# Patient Record
Sex: FEMALE | Race: WHITE | NOT HISPANIC OR LATINO | Employment: OTHER | RURAL
[De-identification: names, ages, dates, MRNs, and addresses within clinical notes are randomized per-mention and may not be internally consistent; named-entity substitution may affect disease eponyms.]

---

## 2018-03-07 ENCOUNTER — HISTORICAL (OUTPATIENT)
Dept: ADMINISTRATIVE | Facility: HOSPITAL | Age: 38
End: 2018-03-07

## 2018-03-09 LAB
LAB AP CLINICAL INFORMATION: NORMAL
LAB AP GENERAL CAT - HISTORICAL: NORMAL
LAB AP INTERPRETATION/RESULT - HISTORICAL: NEGATIVE
LAB AP SPECIMEN ADEQUACY - HISTORICAL: NORMAL
LAB AP SPECIMEN SUBMITTED - HISTORICAL: NORMAL

## 2020-06-09 ENCOUNTER — HISTORICAL (OUTPATIENT)
Dept: ADMINISTRATIVE | Facility: HOSPITAL | Age: 40
End: 2020-06-09

## 2020-06-09 LAB
GLUCOSE SERPL-MCNC: 151 MG/DL (ref 70–105)
GLUCOSE SERPL-MCNC: 195 MG/DL (ref 70–105)

## 2020-07-28 ENCOUNTER — HISTORICAL (OUTPATIENT)
Dept: ADMINISTRATIVE | Facility: HOSPITAL | Age: 40
End: 2020-07-28

## 2020-08-04 ENCOUNTER — HISTORICAL (OUTPATIENT)
Dept: ADMINISTRATIVE | Facility: HOSPITAL | Age: 40
End: 2020-08-04

## 2020-09-02 ENCOUNTER — HISTORICAL (OUTPATIENT)
Dept: ADMINISTRATIVE | Facility: HOSPITAL | Age: 40
End: 2020-09-02

## 2020-09-02 LAB — GLUCOSE SERPL-MCNC: 163 MG/DL (ref 70–105)

## 2020-09-16 ENCOUNTER — HISTORICAL (OUTPATIENT)
Dept: ADMINISTRATIVE | Facility: HOSPITAL | Age: 40
End: 2020-09-16

## 2021-04-14 RX ORDER — OXYCODONE AND ACETAMINOPHEN 7.5; 325 MG/1; MG/1
1 TABLET ORAL EVERY 12 HOURS
COMMUNITY
End: 2021-05-17

## 2021-04-14 RX ORDER — GABAPENTIN 300 MG/1
300 CAPSULE ORAL 3 TIMES DAILY
COMMUNITY
End: 2021-05-17 | Stop reason: SDUPTHER

## 2021-04-14 RX ORDER — BACLOFEN 10 MG/1
10 TABLET ORAL 3 TIMES DAILY
COMMUNITY
End: 2021-08-18

## 2021-04-14 RX ORDER — LEVETIRACETAM 750 MG/1
500 TABLET ORAL 2 TIMES DAILY
Status: ON HOLD | COMMUNITY
End: 2022-05-11 | Stop reason: HOSPADM

## 2021-04-14 RX ORDER — RIZATRIPTAN BENZOATE 10 MG/1
10 TABLET ORAL
COMMUNITY
End: 2021-09-16

## 2021-04-20 ENCOUNTER — OFFICE VISIT (OUTPATIENT)
Dept: PAIN MEDICINE | Facility: CLINIC | Age: 41
End: 2021-04-20
Payer: MEDICARE

## 2021-04-20 VITALS
HEART RATE: 93 BPM | RESPIRATION RATE: 20 BRPM | SYSTOLIC BLOOD PRESSURE: 147 MMHG | WEIGHT: 178.63 LBS | BODY MASS INDEX: 32.87 KG/M2 | DIASTOLIC BLOOD PRESSURE: 81 MMHG | HEIGHT: 62 IN

## 2021-04-20 DIAGNOSIS — M25.562 CHRONIC PAIN OF LEFT KNEE: Chronic | ICD-10-CM

## 2021-04-20 DIAGNOSIS — M05.79 RHEUMATOID ARTHRITIS INVOLVING MULTIPLE SITES WITH POSITIVE RHEUMATOID FACTOR: Chronic | ICD-10-CM

## 2021-04-20 DIAGNOSIS — M45.0 ANKYLOSING SPONDYLITIS OF MULTIPLE SITES IN SPINE: Chronic | ICD-10-CM

## 2021-04-20 DIAGNOSIS — M54.17 LUMBOSACRAL RADICULOPATHY: Primary | Chronic | ICD-10-CM

## 2021-04-20 DIAGNOSIS — M47.812 SPONDYLOSIS WITHOUT MYELOPATHY OR RADICULOPATHY, CERVICAL REGION: Chronic | ICD-10-CM

## 2021-04-20 DIAGNOSIS — G89.29 CHRONIC PAIN OF LEFT KNEE: Chronic | ICD-10-CM

## 2021-04-20 DIAGNOSIS — M54.9 DORSALGIA, UNSPECIFIED: ICD-10-CM

## 2021-04-20 DIAGNOSIS — Z79.899 ENCOUNTER FOR LONG-TERM (CURRENT) USE OF OTHER MEDICATIONS: ICD-10-CM

## 2021-04-20 LAB
CTP QC/QA: YES
POC (AMP) AMPHETAMINE: NEGATIVE
POC (BAR) BARBITURATES: NEGATIVE
POC (BUP) BUPRENORPHINE: NEGATIVE
POC (BZO) BENZODIAZEPINES: NEGATIVE
POC (COC) COCAINE: NEGATIVE
POC (MDMA) METHYLENEDIOXYMETHAMPHETAMINE 3,4: NEGATIVE
POC (MET) METHAMPHETAMINE: NEGATIVE
POC (MOP) OPIATES: NEGATIVE
POC (MTD) METHADONE: NEGATIVE
POC (OXY) OXYCODONE: ABNORMAL
POC (PCP) PHENCYCLIDINE: NEGATIVE
POC (TCA) TRICYCLIC ANTIDEPRESSANTS: NEGATIVE
POC TEMPERATURE (URINE): 90
POC THC: NEGATIVE

## 2021-04-20 PROCEDURE — 99214 OFFICE O/P EST MOD 30 MIN: CPT | Mod: S$PBB,,, | Performed by: PHYSICIAN ASSISTANT

## 2021-04-20 PROCEDURE — 99999 PR PBB SHADOW E&M-EST. PATIENT-LVL V: ICD-10-PCS | Mod: PBBFAC,,, | Performed by: PHYSICIAN ASSISTANT

## 2021-04-20 PROCEDURE — 99215 OFFICE O/P EST HI 40 MIN: CPT | Mod: PBBFAC | Performed by: PHYSICIAN ASSISTANT

## 2021-04-20 PROCEDURE — 99999 PR PBB SHADOW E&M-EST. PATIENT-LVL V: CPT | Mod: PBBFAC,,, | Performed by: PHYSICIAN ASSISTANT

## 2021-04-20 PROCEDURE — 80305 DRUG TEST PRSMV DIR OPT OBS: CPT | Mod: PBBFAC | Performed by: PHYSICIAN ASSISTANT

## 2021-04-20 PROCEDURE — 99214 PR OFFICE/OUTPT VISIT, EST, LEVL IV, 30-39 MIN: ICD-10-PCS | Mod: S$PBB,,, | Performed by: PHYSICIAN ASSISTANT

## 2021-04-20 RX ORDER — OXYCODONE AND ACETAMINOPHEN 7.5; 325 MG/1; MG/1
1 TABLET ORAL EVERY 12 HOURS
Qty: 60 TABLET | Refills: 0 | Status: SHIPPED | OUTPATIENT
Start: 2021-04-23 | End: 2021-05-17 | Stop reason: SDUPTHER

## 2021-04-22 ENCOUNTER — TELEPHONE (OUTPATIENT)
Dept: PAIN MEDICINE | Facility: CLINIC | Age: 41
End: 2021-04-22

## 2021-04-26 ENCOUNTER — PATIENT MESSAGE (OUTPATIENT)
Dept: PAIN MEDICINE | Facility: CLINIC | Age: 41
End: 2021-04-26

## 2021-05-13 ENCOUNTER — TELEPHONE (OUTPATIENT)
Dept: PAIN MEDICINE | Facility: CLINIC | Age: 41
End: 2021-05-13

## 2021-05-13 ENCOUNTER — HOSPITAL ENCOUNTER (EMERGENCY)
Facility: HOSPITAL | Age: 41
Discharge: HOME | End: 2021-05-13
Payer: MEDICARE

## 2021-05-13 VITALS
BODY MASS INDEX: 32.76 KG/M2 | HEART RATE: 100 BPM | DIASTOLIC BLOOD PRESSURE: 90 MMHG | RESPIRATION RATE: 19 BRPM | TEMPERATURE: 98 F | SYSTOLIC BLOOD PRESSURE: 129 MMHG | WEIGHT: 178 LBS | OXYGEN SATURATION: 98 % | HEIGHT: 62 IN

## 2021-05-13 DIAGNOSIS — M54.17 LUMBOSACRAL RADICULOPATHY: Chronic | ICD-10-CM

## 2021-05-13 DIAGNOSIS — M45.0 ANKYLOSING SPONDYLITIS OF MULTIPLE SITES IN SPINE: Chronic | ICD-10-CM

## 2021-05-13 DIAGNOSIS — M47.812 SPONDYLOSIS WITHOUT MYELOPATHY OR RADICULOPATHY, CERVICAL REGION: Chronic | ICD-10-CM

## 2021-05-13 DIAGNOSIS — M05.79 RHEUMATOID ARTHRITIS INVOLVING MULTIPLE SITES WITH POSITIVE RHEUMATOID FACTOR: Chronic | ICD-10-CM

## 2021-05-13 DIAGNOSIS — M25.562 CHRONIC PAIN OF LEFT KNEE: Chronic | ICD-10-CM

## 2021-05-13 DIAGNOSIS — G89.29 CHRONIC PAIN OF LEFT KNEE: Chronic | ICD-10-CM

## 2021-05-13 DIAGNOSIS — S40.012A CONTUSION OF LEFT SHOULDER, INITIAL ENCOUNTER: Primary | ICD-10-CM

## 2021-05-13 LAB — GLUCOSE SERPL-MCNC: 245 MG/DL (ref 70–105)

## 2021-05-13 PROCEDURE — 82962 GLUCOSE BLOOD TEST: CPT

## 2021-05-13 PROCEDURE — 99284 EMERGENCY DEPT VISIT MOD MDM: CPT | Mod: 25

## 2021-05-13 PROCEDURE — 99283 EMERGENCY DEPT VISIT LOW MDM: CPT | Mod: ,,, | Performed by: NURSE PRACTITIONER

## 2021-05-13 PROCEDURE — 96372 THER/PROPH/DIAG INJ SC/IM: CPT

## 2021-05-13 PROCEDURE — 99283 PR EMERGENCY DEPT VISIT,LEVEL III: ICD-10-PCS | Mod: ,,, | Performed by: NURSE PRACTITIONER

## 2021-05-13 PROCEDURE — 63600175 PHARM REV CODE 636 W HCPCS: Performed by: NURSE PRACTITIONER

## 2021-05-13 RX ORDER — HYDROMORPHONE HYDROCHLORIDE 2 MG/ML
1 INJECTION, SOLUTION INTRAMUSCULAR; INTRAVENOUS; SUBCUTANEOUS
Status: COMPLETED | OUTPATIENT
Start: 2021-05-13 | End: 2021-05-13

## 2021-05-13 RX ADMIN — HYDROMORPHONE HYDROCHLORIDE 1 MG: 2 INJECTION, SOLUTION INTRAMUSCULAR; INTRAVENOUS; SUBCUTANEOUS at 10:05

## 2021-05-17 ENCOUNTER — OFFICE VISIT (OUTPATIENT)
Dept: PAIN MEDICINE | Facility: CLINIC | Age: 41
End: 2021-05-17
Payer: MEDICARE

## 2021-05-17 VITALS
RESPIRATION RATE: 18 BRPM | BODY MASS INDEX: 32.64 KG/M2 | HEART RATE: 99 BPM | DIASTOLIC BLOOD PRESSURE: 89 MMHG | WEIGHT: 177.38 LBS | SYSTOLIC BLOOD PRESSURE: 120 MMHG | HEIGHT: 62 IN

## 2021-05-17 DIAGNOSIS — M05.79 RHEUMATOID ARTHRITIS INVOLVING MULTIPLE SITES WITH POSITIVE RHEUMATOID FACTOR: Chronic | ICD-10-CM

## 2021-05-17 DIAGNOSIS — M54.12 RADICULOPATHY, CERVICAL REGION: Chronic | ICD-10-CM

## 2021-05-17 DIAGNOSIS — G89.4 CHRONIC PAIN SYNDROME: Chronic | ICD-10-CM

## 2021-05-17 DIAGNOSIS — M25.512 ACUTE PAIN OF LEFT SHOULDER: ICD-10-CM

## 2021-05-17 DIAGNOSIS — M45.0 ANKYLOSING SPONDYLITIS OF MULTIPLE SITES IN SPINE: Primary | Chronic | ICD-10-CM

## 2021-05-17 DIAGNOSIS — M47.817 SPONDYLOSIS OF LUMBOSACRAL REGION WITHOUT MYELOPATHY OR RADICULOPATHY: Chronic | ICD-10-CM

## 2021-05-17 PROCEDURE — 99214 OFFICE O/P EST MOD 30 MIN: CPT | Mod: S$PBB,,, | Performed by: PHYSICIAN ASSISTANT

## 2021-05-17 PROCEDURE — 99214 PR OFFICE/OUTPT VISIT, EST, LEVL IV, 30-39 MIN: ICD-10-PCS | Mod: S$PBB,,, | Performed by: PHYSICIAN ASSISTANT

## 2021-05-17 PROCEDURE — 99214 OFFICE O/P EST MOD 30 MIN: CPT | Mod: PBBFAC | Performed by: PHYSICIAN ASSISTANT

## 2021-05-17 RX ORDER — GABAPENTIN 300 MG/1
300 CAPSULE ORAL EVERY 8 HOURS
Qty: 90 CAPSULE | Refills: 2 | Status: SHIPPED | OUTPATIENT
Start: 2021-05-17 | End: 2021-07-19 | Stop reason: SDUPTHER

## 2021-05-17 RX ORDER — OXYCODONE AND ACETAMINOPHEN 7.5; 325 MG/1; MG/1
1 TABLET ORAL EVERY 12 HOURS
Qty: 60 TABLET | Refills: 0 | Status: SHIPPED | OUTPATIENT
Start: 2021-06-16 | End: 2021-07-19 | Stop reason: SDUPTHER

## 2021-05-17 RX ORDER — OXYCODONE AND ACETAMINOPHEN 7.5; 325 MG/1; MG/1
1 TABLET ORAL EVERY 8 HOURS
Qty: 90 TABLET | Refills: 0 | Status: SHIPPED | OUTPATIENT
Start: 2021-05-17 | End: 2021-06-16

## 2021-06-04 ENCOUNTER — OFFICE VISIT (OUTPATIENT)
Dept: PAIN MEDICINE | Facility: CLINIC | Age: 41
End: 2021-06-04
Payer: MEDICARE

## 2021-06-04 VITALS
WEIGHT: 176.19 LBS | HEART RATE: 100 BPM | BODY MASS INDEX: 32.42 KG/M2 | HEIGHT: 62 IN | DIASTOLIC BLOOD PRESSURE: 98 MMHG | SYSTOLIC BLOOD PRESSURE: 145 MMHG | RESPIRATION RATE: 18 BRPM

## 2021-06-04 DIAGNOSIS — M54.12 RADICULOPATHY, CERVICAL REGION: Primary | Chronic | ICD-10-CM

## 2021-06-04 DIAGNOSIS — M05.79 RHEUMATOID ARTHRITIS INVOLVING MULTIPLE SITES WITH POSITIVE RHEUMATOID FACTOR: Chronic | ICD-10-CM

## 2021-06-04 DIAGNOSIS — M25.512 CHRONIC LEFT SHOULDER PAIN: ICD-10-CM

## 2021-06-04 DIAGNOSIS — G89.29 CHRONIC LEFT SHOULDER PAIN: ICD-10-CM

## 2021-06-04 DIAGNOSIS — M45.0 ANKYLOSING SPONDYLITIS OF MULTIPLE SITES IN SPINE: Chronic | ICD-10-CM

## 2021-06-04 PROCEDURE — 99213 PR OFFICE/OUTPT VISIT, EST, LEVL III, 20-29 MIN: ICD-10-PCS | Mod: S$PBB,,, | Performed by: PHYSICIAN ASSISTANT

## 2021-06-04 PROCEDURE — 99215 OFFICE O/P EST HI 40 MIN: CPT | Mod: PBBFAC | Performed by: PHYSICIAN ASSISTANT

## 2021-06-04 PROCEDURE — 99213 OFFICE O/P EST LOW 20 MIN: CPT | Mod: S$PBB,,, | Performed by: PHYSICIAN ASSISTANT

## 2021-06-11 ENCOUNTER — HOSPITAL ENCOUNTER (OUTPATIENT)
Dept: RADIOLOGY | Facility: HOSPITAL | Age: 41
Discharge: HOME | End: 2021-06-11
Attending: PHYSICIAN ASSISTANT
Payer: MEDICARE

## 2021-06-11 DIAGNOSIS — M54.12 RADICULOPATHY, CERVICAL REGION: ICD-10-CM

## 2021-06-11 PROCEDURE — 72141 MRI NECK SPINE W/O DYE: CPT | Mod: 26,,, | Performed by: RADIOLOGY

## 2021-06-11 PROCEDURE — 72141 MRI NECK SPINE W/O DYE: CPT | Mod: TC

## 2021-06-11 PROCEDURE — 72141 MRI CERVICAL SPINE WITHOUT CONTRAST: ICD-10-PCS | Mod: 26,,, | Performed by: RADIOLOGY

## 2021-07-19 ENCOUNTER — OFFICE VISIT (OUTPATIENT)
Dept: PAIN MEDICINE | Facility: CLINIC | Age: 41
End: 2021-07-19
Payer: MEDICARE

## 2021-07-19 VITALS
RESPIRATION RATE: 18 BRPM | BODY MASS INDEX: 32.76 KG/M2 | HEIGHT: 62 IN | HEART RATE: 90 BPM | WEIGHT: 178 LBS | SYSTOLIC BLOOD PRESSURE: 140 MMHG | DIASTOLIC BLOOD PRESSURE: 81 MMHG

## 2021-07-19 DIAGNOSIS — Z79.899 ENCOUNTER FOR LONG-TERM (CURRENT) USE OF OTHER MEDICATIONS: Primary | ICD-10-CM

## 2021-07-19 DIAGNOSIS — M47.817 SPONDYLOSIS OF LUMBOSACRAL REGION WITHOUT MYELOPATHY OR RADICULOPATHY: Chronic | ICD-10-CM

## 2021-07-19 DIAGNOSIS — M25.512 CHRONIC LEFT SHOULDER PAIN: ICD-10-CM

## 2021-07-19 DIAGNOSIS — M25.512 ACUTE PAIN OF LEFT SHOULDER: ICD-10-CM

## 2021-07-19 DIAGNOSIS — G89.29 CHRONIC LEFT SHOULDER PAIN: ICD-10-CM

## 2021-07-19 DIAGNOSIS — M45.0 ANKYLOSING SPONDYLITIS OF MULTIPLE SITES IN SPINE: Chronic | ICD-10-CM

## 2021-07-19 DIAGNOSIS — M05.79 RHEUMATOID ARTHRITIS INVOLVING MULTIPLE SITES WITH POSITIVE RHEUMATOID FACTOR: Chronic | ICD-10-CM

## 2021-07-19 DIAGNOSIS — M54.12 CERVICAL RADICULOPATHY: ICD-10-CM

## 2021-07-19 LAB
CTP QC/QA: YES
POC (AMP) AMPHETAMINE: NEGATIVE
POC (BAR) BARBITURATES: NEGATIVE
POC (BUP) BUPRENORPHINE: NEGATIVE
POC (BZO) BENZODIAZEPINES: NEGATIVE
POC (COC) COCAINE: NEGATIVE
POC (MDMA) METHYLENEDIOXYMETHAMPHETAMINE 3,4: NEGATIVE
POC (MET) METHAMPHETAMINE: NEGATIVE
POC (MOP) OPIATES: NEGATIVE
POC (MTD) METHADONE: NEGATIVE
POC (OXY) OXYCODONE: ABNORMAL
POC (PCP) PHENCYCLIDINE: NEGATIVE
POC (TCA) TRICYCLIC ANTIDEPRESSANTS: NEGATIVE
POC TEMPERATURE (URINE): 96
POC THC: NEGATIVE

## 2021-07-19 PROCEDURE — 99215 OFFICE O/P EST HI 40 MIN: CPT | Mod: PBBFAC | Performed by: PHYSICIAN ASSISTANT

## 2021-07-19 PROCEDURE — 99214 OFFICE O/P EST MOD 30 MIN: CPT | Mod: S$PBB,,, | Performed by: PHYSICIAN ASSISTANT

## 2021-07-19 PROCEDURE — 80305 DRUG TEST PRSMV DIR OPT OBS: CPT | Mod: PBBFAC | Performed by: PHYSICIAN ASSISTANT

## 2021-07-19 PROCEDURE — 99214 PR OFFICE/OUTPT VISIT, EST, LEVL IV, 30-39 MIN: ICD-10-PCS | Mod: S$PBB,,, | Performed by: PHYSICIAN ASSISTANT

## 2021-07-19 RX ORDER — GABAPENTIN 300 MG/1
300 CAPSULE ORAL EVERY 8 HOURS
Qty: 90 CAPSULE | Refills: 0 | Status: SHIPPED | OUTPATIENT
Start: 2021-07-19 | End: 2021-08-16 | Stop reason: SDUPTHER

## 2021-07-19 RX ORDER — OXYCODONE AND ACETAMINOPHEN 7.5; 325 MG/1; MG/1
1 TABLET ORAL EVERY 12 HOURS
Qty: 60 TABLET | Refills: 0 | Status: SHIPPED | OUTPATIENT
Start: 2021-07-19 | End: 2021-08-16 | Stop reason: SDUPTHER

## 2021-07-26 ENCOUNTER — TELEPHONE (OUTPATIENT)
Dept: PAIN MEDICINE | Facility: CLINIC | Age: 41
End: 2021-07-26

## 2021-08-10 ENCOUNTER — TELEPHONE (OUTPATIENT)
Dept: PAIN MEDICINE | Facility: CLINIC | Age: 41
End: 2021-08-10

## 2021-08-18 ENCOUNTER — HOSPITAL ENCOUNTER (OUTPATIENT)
Dept: RADIOLOGY | Facility: HOSPITAL | Age: 41
Discharge: HOME | End: 2021-08-18
Attending: ORTHOPAEDIC SURGERY
Payer: MEDICARE

## 2021-08-18 ENCOUNTER — OFFICE VISIT (OUTPATIENT)
Dept: PAIN MEDICINE | Facility: CLINIC | Age: 41
End: 2021-08-18
Payer: MEDICARE

## 2021-08-18 VITALS
DIASTOLIC BLOOD PRESSURE: 95 MMHG | BODY MASS INDEX: 32.91 KG/M2 | WEIGHT: 178.81 LBS | RESPIRATION RATE: 18 BRPM | HEART RATE: 87 BPM | HEIGHT: 62 IN | SYSTOLIC BLOOD PRESSURE: 152 MMHG

## 2021-08-18 DIAGNOSIS — M45.0 ANKYLOSING SPONDYLITIS OF MULTIPLE SITES IN SPINE: Chronic | ICD-10-CM

## 2021-08-18 DIAGNOSIS — G89.29 CHRONIC LEFT SHOULDER PAIN: ICD-10-CM

## 2021-08-18 DIAGNOSIS — M47.817 SPONDYLOSIS OF LUMBOSACRAL REGION WITHOUT MYELOPATHY OR RADICULOPATHY: Chronic | ICD-10-CM

## 2021-08-18 DIAGNOSIS — M25.512 CHRONIC LEFT SHOULDER PAIN: ICD-10-CM

## 2021-08-18 DIAGNOSIS — Z79.899 ENCOUNTER FOR LONG-TERM (CURRENT) USE OF OTHER MEDICATIONS: ICD-10-CM

## 2021-08-18 DIAGNOSIS — M25.512 ACUTE PAIN OF LEFT SHOULDER: ICD-10-CM

## 2021-08-18 DIAGNOSIS — M54.17 LUMBOSACRAL RADICULOPATHY: Chronic | ICD-10-CM

## 2021-08-18 DIAGNOSIS — M47.812 SPONDYLOSIS WITHOUT MYELOPATHY OR RADICULOPATHY, CERVICAL REGION: Chronic | ICD-10-CM

## 2021-08-18 DIAGNOSIS — M79.671 ACUTE FOOT PAIN, RIGHT: ICD-10-CM

## 2021-08-18 DIAGNOSIS — M05.79 RHEUMATOID ARTHRITIS INVOLVING MULTIPLE SITES WITH POSITIVE RHEUMATOID FACTOR: Primary | Chronic | ICD-10-CM

## 2021-08-18 LAB
CTP QC/QA: YES
POC (AMP) AMPHETAMINE: NEGATIVE
POC (BAR) BARBITURATES: NEGATIVE
POC (BUP) BUPRENORPHINE: NEGATIVE
POC (BZO) BENZODIAZEPINES: NEGATIVE
POC (COC) COCAINE: NEGATIVE
POC (MDMA) METHYLENEDIOXYMETHAMPHETAMINE 3,4: NEGATIVE
POC (MET) METHAMPHETAMINE: NEGATIVE
POC (MOP) OPIATES: ABNORMAL
POC (MTD) METHADONE: NEGATIVE
POC (OXY) OXYCODONE: NEGATIVE
POC (PCP) PHENCYCLIDINE: NEGATIVE
POC (TCA) TRICYCLIC ANTIDEPRESSANTS: NEGATIVE
POC TEMPERATURE (URINE): 94
POC THC: NEGATIVE

## 2021-08-18 PROCEDURE — 99214 OFFICE O/P EST MOD 30 MIN: CPT | Mod: PBBFAC | Performed by: PHYSICIAN ASSISTANT

## 2021-08-18 PROCEDURE — 99214 PR OFFICE/OUTPT VISIT, EST, LEVL IV, 30-39 MIN: ICD-10-PCS | Mod: S$PBB,,, | Performed by: PHYSICIAN ASSISTANT

## 2021-08-18 PROCEDURE — 80305 DRUG TEST PRSMV DIR OPT OBS: CPT | Mod: PBBFAC | Performed by: PHYSICIAN ASSISTANT

## 2021-08-18 PROCEDURE — G0481 DRUG TEST DEF 8-14 CLASSES: HCPCS | Mod: ,,, | Performed by: CLINICAL MEDICAL LABORATORY

## 2021-08-18 PROCEDURE — 99214 OFFICE O/P EST MOD 30 MIN: CPT | Mod: S$PBB,,, | Performed by: PHYSICIAN ASSISTANT

## 2021-08-18 PROCEDURE — G0481 DRUG SCREEN DEFINITIVE 14, URINE: ICD-10-PCS | Mod: ,,, | Performed by: CLINICAL MEDICAL LABORATORY

## 2021-08-18 PROCEDURE — 73630 X-RAY EXAM OF FOOT: CPT | Mod: TC,RT

## 2021-08-18 RX ORDER — OXYCODONE AND ACETAMINOPHEN 7.5; 325 MG/1; MG/1
1 TABLET ORAL EVERY 12 HOURS
Qty: 60 TABLET | Refills: 0 | Status: SHIPPED | OUTPATIENT
Start: 2021-08-18 | End: 2021-09-17

## 2021-08-18 RX ORDER — GABAPENTIN 300 MG/1
300 CAPSULE ORAL EVERY 8 HOURS
Qty: 90 CAPSULE | Refills: 0 | Status: SHIPPED | OUTPATIENT
Start: 2021-08-18 | End: 2021-09-15 | Stop reason: SDUPTHER

## 2021-08-19 ENCOUNTER — PATIENT MESSAGE (OUTPATIENT)
Dept: PAIN MEDICINE | Facility: CLINIC | Age: 41
End: 2021-08-19

## 2021-09-08 ENCOUNTER — PATIENT MESSAGE (OUTPATIENT)
Dept: PAIN MEDICINE | Facility: CLINIC | Age: 41
End: 2021-09-08

## 2021-09-08 LAB
CREAT UR-MCNC: 53 MG/DL (ref 28–219)
PH UR STRIP: 6.5 PH UNITS
SP GR UR STRIP: 1.01

## 2021-09-15 ENCOUNTER — OFFICE VISIT (OUTPATIENT)
Dept: PAIN MEDICINE | Facility: CLINIC | Age: 41
End: 2021-09-15
Payer: MEDICARE

## 2021-09-15 VITALS
SYSTOLIC BLOOD PRESSURE: 136 MMHG | DIASTOLIC BLOOD PRESSURE: 94 MMHG | HEIGHT: 62 IN | WEIGHT: 180 LBS | HEART RATE: 92 BPM | BODY MASS INDEX: 33.13 KG/M2

## 2021-09-15 DIAGNOSIS — M54.17 LUMBOSACRAL RADICULOPATHY: ICD-10-CM

## 2021-09-15 DIAGNOSIS — M05.79 RHEUMATOID ARTHRITIS INVOLVING MULTIPLE SITES WITH POSITIVE RHEUMATOID FACTOR: Chronic | ICD-10-CM

## 2021-09-15 DIAGNOSIS — M47.812 SPONDYLOSIS WITHOUT MYELOPATHY OR RADICULOPATHY, CERVICAL REGION: ICD-10-CM

## 2021-09-15 DIAGNOSIS — M25.512 ACUTE PAIN OF LEFT SHOULDER: ICD-10-CM

## 2021-09-15 DIAGNOSIS — M45.0 ANKYLOSING SPONDYLITIS OF MULTIPLE SITES IN SPINE: Primary | ICD-10-CM

## 2021-09-15 DIAGNOSIS — G89.4 CHRONIC PAIN SYNDROME: ICD-10-CM

## 2021-09-15 PROCEDURE — 99213 OFFICE O/P EST LOW 20 MIN: CPT | Mod: S$PBB,,, | Performed by: PAIN MEDICINE

## 2021-09-15 PROCEDURE — 99213 OFFICE O/P EST LOW 20 MIN: CPT | Mod: PBBFAC | Performed by: PAIN MEDICINE

## 2021-09-15 PROCEDURE — 99213 PR OFFICE/OUTPT VISIT, EST, LEVL III, 20-29 MIN: ICD-10-PCS | Mod: S$PBB,,, | Performed by: PAIN MEDICINE

## 2021-09-15 RX ORDER — GABAPENTIN 300 MG/1
300 CAPSULE ORAL EVERY 8 HOURS
Qty: 90 CAPSULE | Refills: 5 | Status: SHIPPED | OUTPATIENT
Start: 2021-09-15 | End: 2022-02-08 | Stop reason: SDUPTHER

## 2021-09-15 RX ORDER — OXYCODONE AND ACETAMINOPHEN 7.5; 325 MG/1; MG/1
1 TABLET ORAL EVERY 8 HOURS PRN
Qty: 60 TABLET | Refills: 0 | Status: SHIPPED | OUTPATIENT
Start: 2021-10-17 | End: 2021-11-15 | Stop reason: SDUPTHER

## 2021-09-15 RX ORDER — OXYCODONE AND ACETAMINOPHEN 7.5; 325 MG/1; MG/1
1 TABLET ORAL EVERY 12 HOURS PRN
Qty: 60 TABLET | Refills: 0 | Status: SHIPPED | OUTPATIENT
Start: 2021-09-17 | End: 2021-10-17

## 2021-11-16 ENCOUNTER — OFFICE VISIT (OUTPATIENT)
Dept: PAIN MEDICINE | Facility: CLINIC | Age: 41
End: 2021-11-16
Payer: MEDICARE

## 2021-11-16 VITALS
HEIGHT: 63 IN | WEIGHT: 178 LBS | HEART RATE: 94 BPM | DIASTOLIC BLOOD PRESSURE: 90 MMHG | BODY MASS INDEX: 31.54 KG/M2 | SYSTOLIC BLOOD PRESSURE: 146 MMHG | RESPIRATION RATE: 18 BRPM

## 2021-11-16 DIAGNOSIS — M47.812 SPONDYLOSIS WITHOUT MYELOPATHY OR RADICULOPATHY, CERVICAL REGION: Chronic | ICD-10-CM

## 2021-11-16 DIAGNOSIS — M45.0 ANKYLOSING SPONDYLITIS OF MULTIPLE SITES IN SPINE: Primary | Chronic | ICD-10-CM

## 2021-11-16 DIAGNOSIS — M05.79 RHEUMATOID ARTHRITIS INVOLVING MULTIPLE SITES WITH POSITIVE RHEUMATOID FACTOR: Chronic | ICD-10-CM

## 2021-11-16 DIAGNOSIS — Z79.899 ENCOUNTER FOR LONG-TERM (CURRENT) USE OF OTHER MEDICATIONS: ICD-10-CM

## 2021-11-16 LAB
CTP QC/QA: YES
POC (AMP) AMPHETAMINE: NEGATIVE
POC (BAR) BARBITURATES: NEGATIVE
POC (BUP) BUPRENORPHINE: NEGATIVE
POC (BZO) BENZODIAZEPINES: NEGATIVE
POC (COC) COCAINE: NEGATIVE
POC (MDMA) METHYLENEDIOXYMETHAMPHETAMINE 3,4: NEGATIVE
POC (MET) METHAMPHETAMINE: NEGATIVE
POC (MOP) OPIATES: NEGATIVE
POC (MTD) METHADONE: NEGATIVE
POC (OXY) OXYCODONE: ABNORMAL
POC (PCP) PHENCYCLIDINE: NEGATIVE
POC (TCA) TRICYCLIC ANTIDEPRESSANTS: NEGATIVE
POC TEMPERATURE (URINE): 94
POC THC: NEGATIVE

## 2021-11-16 PROCEDURE — 99214 PR OFFICE/OUTPT VISIT, EST, LEVL IV, 30-39 MIN: ICD-10-PCS | Mod: S$PBB,,, | Performed by: PHYSICIAN ASSISTANT

## 2021-11-16 PROCEDURE — 99214 OFFICE O/P EST MOD 30 MIN: CPT | Mod: PBBFAC | Performed by: PHYSICIAN ASSISTANT

## 2021-11-16 PROCEDURE — 80305 DRUG TEST PRSMV DIR OPT OBS: CPT | Mod: PBBFAC | Performed by: PHYSICIAN ASSISTANT

## 2021-11-16 PROCEDURE — 99214 OFFICE O/P EST MOD 30 MIN: CPT | Mod: S$PBB,,, | Performed by: PHYSICIAN ASSISTANT

## 2021-11-16 RX ORDER — OXYCODONE AND ACETAMINOPHEN 7.5; 325 MG/1; MG/1
1 TABLET ORAL EVERY 12 HOURS
Qty: 60 TABLET | Refills: 0 | Status: SHIPPED | OUTPATIENT
Start: 2021-11-16 | End: 2021-11-16

## 2021-11-16 RX ORDER — OXYCODONE AND ACETAMINOPHEN 7.5; 325 MG/1; MG/1
1 TABLET ORAL EVERY 12 HOURS
Qty: 60 TABLET | Refills: 0 | Status: SHIPPED | OUTPATIENT
Start: 2021-12-16 | End: 2022-01-12

## 2021-11-16 RX ORDER — OXYCODONE AND ACETAMINOPHEN 7.5; 325 MG/1; MG/1
1 TABLET ORAL EVERY 12 HOURS
Qty: 60 TABLET | Refills: 0 | Status: SHIPPED | OUTPATIENT
Start: 2021-12-16 | End: 2021-11-16

## 2021-11-16 RX ORDER — OXYCODONE AND ACETAMINOPHEN 7.5; 325 MG/1; MG/1
1 TABLET ORAL EVERY 12 HOURS
Qty: 60 TABLET | Refills: 0 | Status: SHIPPED | OUTPATIENT
Start: 2021-11-16 | End: 2021-12-16

## 2021-12-06 ENCOUNTER — PATIENT MESSAGE (OUTPATIENT)
Dept: PAIN MEDICINE | Facility: CLINIC | Age: 41
End: 2021-12-06
Payer: MEDICARE

## 2022-01-12 RX ORDER — OXYCODONE AND ACETAMINOPHEN 7.5; 325 MG/1; MG/1
1 TABLET ORAL EVERY 12 HOURS
Qty: 60 TABLET | Refills: 0 | Status: SHIPPED | OUTPATIENT
Start: 2022-01-15 | End: 2022-02-08 | Stop reason: SDUPTHER

## 2022-02-08 ENCOUNTER — HOSPITAL ENCOUNTER (OUTPATIENT)
Dept: RADIOLOGY | Facility: HOSPITAL | Age: 42
Discharge: HOME | End: 2022-02-08
Attending: NURSE PRACTITIONER
Payer: MEDICARE

## 2022-02-08 DIAGNOSIS — M25.511 RIGHT SHOULDER PAIN, UNSPECIFIED CHRONICITY: Primary | ICD-10-CM

## 2022-02-08 DIAGNOSIS — M25.511 RIGHT SHOULDER PAIN, UNSPECIFIED CHRONICITY: ICD-10-CM

## 2022-02-08 DIAGNOSIS — M25.521 RIGHT ELBOW PAIN: ICD-10-CM

## 2022-02-08 PROCEDURE — 73070 XR ELBOW 2 VIEWS RIGHT: ICD-10-PCS | Mod: 26,RT,, | Performed by: RADIOLOGY

## 2022-02-08 PROCEDURE — 73070 X-RAY EXAM OF ELBOW: CPT | Mod: TC,RT

## 2022-02-08 PROCEDURE — 73030 X-RAY EXAM OF SHOULDER: CPT | Mod: TC,RT

## 2022-02-08 PROCEDURE — 73030 X-RAY EXAM OF SHOULDER: CPT | Mod: 26,RT,, | Performed by: RADIOLOGY

## 2022-02-08 PROCEDURE — 73030 XR SHOULDER COMPLETE 2 OR MORE VIEWS RIGHT: ICD-10-PCS | Mod: 26,RT,, | Performed by: RADIOLOGY

## 2022-02-08 PROCEDURE — 73070 X-RAY EXAM OF ELBOW: CPT | Mod: 26,RT,, | Performed by: RADIOLOGY

## 2022-02-08 NOTE — PROGRESS NOTES
Disclaimer:  This note has been generated using voice recognition software.  There may be type of graft focal areas that have been missed during a proof reading      Subjective:      Patient ID: Cherry Smiley is a 41 y.o. female.    Chief Complaint: Shoulder Pain (el) and Elbow Pain      Pain  This is a chronic problem. The current episode started more than 1 year ago. The problem occurs daily. The problem has been waxing and waning. Associated symptoms include arthralgias. Pertinent negatives include no change in bowel habit, chest pain, chills, coughing, fatigue, fever, rash, sore throat, vertigo or vomiting.     Review of Systems   Constitutional: Negative for appetite change, chills, fatigue, fever and unexpected weight change.   HENT: Negative for drooling, ear discharge, ear pain, facial swelling, nosebleeds, sore throat, trouble swallowing, voice change and goiter.    Eyes: Negative for photophobia, pain, discharge, redness and visual disturbance.   Respiratory: Negative for apnea, cough, choking, chest tightness, shortness of breath, wheezing and stridor.    Cardiovascular: Negative for chest pain, palpitations and leg swelling.   Gastrointestinal: Negative for abdominal distention, change in bowel habit, diarrhea, rectal pain, vomiting, fecal incontinence and change in bowel habit.   Endocrine: Negative for cold intolerance, heat intolerance, polydipsia, polyphagia and polyuria.   Genitourinary: Negative for bladder incontinence, dysuria, flank pain, frequency and hot flashes.   Musculoskeletal: Positive for arthralgias, back pain, leg pain and neck stiffness.   Integumentary:  Negative for color change, pallor and rash.   Allergic/Immunologic: Negative for immunocompromised state.   Neurological: Negative for dizziness, vertigo, seizures, syncope, facial asymmetry, speech difficulty, light-headedness, disturbances in coordination, memory loss and coordination difficulties.   Hematological: Negative for  "adenopathy. Does not bruise/bleed easily.   Psychiatric/Behavioral: Negative for agitation, behavioral problems, confusion, decreased concentration, dysphoric mood, hallucinations, self-injury and suicidal ideas. The patient is not nervous/anxious and is not hyperactive.             Objective:  Vitals:    02/10/22 0953 02/10/22 0954   BP: (!) 142/89    Pulse: 93    Resp: 18    Weight: 80.7 kg (178 lb)    Height: 5' 2" (1.575 m)    PainSc:   7   7         Physical Exam  Vitals and nursing note reviewed. Exam conducted with a chaperone present.   Constitutional:       General: She is awake.      Appearance: Normal appearance. She is not ill-appearing or diaphoretic.   HENT:      Head: Normocephalic and atraumatic.      Nose: Nose normal.      Mouth/Throat:      Mouth: Mucous membranes are moist.      Pharynx: Oropharynx is clear.   Eyes:      Conjunctiva/sclera: Conjunctivae normal.      Pupils: Pupils are equal, round, and reactive to light.   Cardiovascular:      Rate and Rhythm: Normal rate.   Pulmonary:      Effort: Pulmonary effort is normal. No respiratory distress.   Abdominal:      Palpations: Abdomen is soft.   Musculoskeletal:         General: Normal range of motion.      Cervical back: Normal range of motion and neck supple.   Skin:     General: Skin is warm and dry.   Neurological:      General: No focal deficit present.      Mental Status: She is alert and oriented to person, place, and time. Mental status is at baseline.      Cranial Nerves: Cranial nerves are intact. No cranial nerve deficit (II-XII).   Psychiatric:         Mood and Affect: Mood normal.         Behavior: Behavior normal. Behavior is cooperative.         Thought Content: Thought content normal.           X-Ray Elbow 2 Views Right  Narrative: EXAMINATION:  XR ELBOW 2 VIEWS RIGHT    CLINICAL HISTORY:  Pain in right elbow    COMPARISON:  None available    FINDINGS:  No evidence of fracture seen.  The alignment of the joints appears normal.  " No degenerative change is present.  No soft tissue abnormality is seen.  Impression: No evidence of abnormality demonstrated    Electronically signed by: Eren James  Date:    02/08/2022  Time:    08:40  X-ray Shoulder 2 or More Views Right  Narrative: EXAMINATION:  XR SHOULDER COMPLETE 2 OR MORE VIEWS RIGHT    CLINICAL HISTORY:  Pain in right shoulder    COMPARISON:  26 November 2019    FINDINGS:  No evidence of fracture seen.  The alignment of the joints appears normal.  Mild-to-moderate acromioclavicular joint and mild glenohumeral degenerative change is present.  No soft tissue abnormality is seen.  Impression: Shoulder osteoarthrosis as described above.    Electronically signed by: Eren James  Date:    02/08/2022  Time:    08:25       Office Visit on 11/16/2021   Component Date Value Ref Range Status    POC Amphetamines 11/16/2021 Negative  Negative, Inconclusive Final    POC Barbiturates 11/16/2021 Negative  Negative, Inconclusive Final    POC Benzodiazepines 11/16/2021 Negative  Negative, Inconclusive Final    POC Cocaine 11/16/2021 Negative  Negative, Inconclusive Final    POC THC 11/16/2021 Negative  Negative, Inconclusive Final    POC Methadone 11/16/2021 Negative  Negative, Inconclusive Final    POC Methamphetamine 11/16/2021 Negative  Negative, Inconclusive Final    POC Opiates 11/16/2021 Negative  Negative, Inconclusive Final    POC Oxycodone 11/16/2021 Presumptive Positive* Negative, Inconclusive Final    POC Phencyclidine 11/16/2021 Negative  Negative, Inconclusive Final    POC Methylenedioxymethamphetamine * 11/16/2021 Negative  Negative, Inconclusive Final    POC Tricyclic Antidepressants 11/16/2021 Negative  Negative, Inconclusive Final    POC Buprenorphine 11/16/2021 Negative   Final     Acceptable 11/16/2021 Yes   Final    POC Temperature (Urine) 11/16/2021 94   Final   Office Visit on 08/18/2021   Component Date Value Ref Range Status    POC Amphetamines  08/18/2021 Negative  Negative, Inconclusive Final    POC Barbiturates 08/18/2021 Negative  Negative, Inconclusive Final    POC Benzodiazepines 08/18/2021 Negative  Negative, Inconclusive Final    POC Cocaine 08/18/2021 Negative  Negative, Inconclusive Final    POC THC 08/18/2021 Negative  Negative, Inconclusive Final    POC Methadone 08/18/2021 Negative  Negative, Inconclusive Final    POC Methamphetamine 08/18/2021 Negative  Negative, Inconclusive Final    POC Opiates 08/18/2021 Presumptive Positive* Negative, Inconclusive Final    POC Oxycodone 08/18/2021 Negative  Negative, Inconclusive Final    POC Phencyclidine 08/18/2021 Negative  Negative, Inconclusive Final    POC Methylenedioxymethamphetamine * 08/18/2021 Negative  Negative, Inconclusive Final    POC Tricyclic Antidepressants 08/18/2021 Negative  Negative, Inconclusive Final    POC Buprenorphine 08/18/2021 Negative   Final     Acceptable 08/18/2021 Yes   Final    POC Temperature (Urine) 08/18/2021 94   Final    pH, UA 08/18/2021 6.5  5.0, 5.5, 6.0, 6.5, 7.0, 7.5, 8.0 pH Units Final    Specific Gravity, UA 08/18/2021 1.010  <=1.005, 1.010, 1.015, 1.020, 1.025, 1.030 Final    Creatinine, Urine 08/18/2021 53  28 - 219 mg/dL Final           Assessment:      1. Rheumatoid arthritis involving multiple sites with positive rheumatoid factor    2. Ankylosing spondylitis of multiple sites in spine    3. Acute pain of left shoulder    4. Spondylosis without myelopathy or radiculopathy, cervical region    5. Encounter for long-term (current) use of other medications          Orders Placed This Encounter   Procedures    POCT Urine Drug Screen Presump     Interpretive Information:     Negative:  No drug detected at the cut off level.   Positive:  This result represents presumptive positive for the   tested drug, other substances may yield a positive response other   than the analyte of interest. This result should be utilized for    diagnostic purpose only. Confirmation testing will be performed upon physician request only.            A's of Opioid Risk Assessment  Activity:Patient can perform ADL.   Analgesia:Patients pain is partially controlled by current medication. Patient has tried OTC medications such as Tylenol and Ibuprofen with out relief.   Adverse Effects: Patient denies constipation or sedation.  Aberrant Behavior:  reviewed with no aberrant drug seeking/taking behavior.  Overdose reversal drug naloxone discussed    Drug screen reviewed      Requested Prescriptions     Signed Prescriptions Disp Refills    gabapentin (NEURONTIN) 300 MG capsule 90 capsule 1     Sig: Take 1 capsule (300 mg total) by mouth every 8 (eight) hours.    oxyCODONE-acetaminophen (PERCOCET) 7.5-325 mg per tablet 60 tablet 0     Sig: Take 1 tablet by mouth every 12 (twelve) hours.    oxyCODONE-acetaminophen (PERCOCET) 7.5-325 mg per tablet 60 tablet 0     Sig: Take 1 tablet by mouth every 12 (twelve) hours.         Plan:    Follows rheumatology rheumatoid arthritis, ankylosing spondylitis    Requesting referral rheumatology [facility]    Recovering after follows home right arm elbow injury follows orthopedic surgery [facility] currently being evaluated for possible john brachial plexus injury on the right    Continues complaint neck and back pain    She states she would like to continue conservative management from our standpoint    Continue home exercise program as directed    Referral rheumatology please evaluate treat ankylosing spondylitis rheumatoid arthritis    Continue current medication    Follow-up 2 months    Dr. Venegas, September 2022      Bring original prescription medication bottles/container/box with labels to each visit

## 2022-02-10 ENCOUNTER — OFFICE VISIT (OUTPATIENT)
Dept: PAIN MEDICINE | Facility: CLINIC | Age: 42
End: 2022-02-10
Payer: MEDICARE

## 2022-02-10 VITALS
DIASTOLIC BLOOD PRESSURE: 89 MMHG | RESPIRATION RATE: 18 BRPM | HEART RATE: 93 BPM | HEIGHT: 62 IN | WEIGHT: 178 LBS | SYSTOLIC BLOOD PRESSURE: 142 MMHG | BODY MASS INDEX: 32.76 KG/M2

## 2022-02-10 DIAGNOSIS — Z79.899 ENCOUNTER FOR LONG-TERM (CURRENT) USE OF OTHER MEDICATIONS: ICD-10-CM

## 2022-02-10 DIAGNOSIS — M25.512 ACUTE PAIN OF LEFT SHOULDER: ICD-10-CM

## 2022-02-10 DIAGNOSIS — M45.0 ANKYLOSING SPONDYLITIS OF MULTIPLE SITES IN SPINE: ICD-10-CM

## 2022-02-10 DIAGNOSIS — M05.79 RHEUMATOID ARTHRITIS INVOLVING MULTIPLE SITES WITH POSITIVE RHEUMATOID FACTOR: Primary | Chronic | ICD-10-CM

## 2022-02-10 DIAGNOSIS — M47.812 SPONDYLOSIS WITHOUT MYELOPATHY OR RADICULOPATHY, CERVICAL REGION: Chronic | ICD-10-CM

## 2022-02-10 PROCEDURE — 99214 PR OFFICE/OUTPT VISIT, EST, LEVL IV, 30-39 MIN: ICD-10-PCS | Mod: S$PBB,,, | Performed by: PHYSICIAN ASSISTANT

## 2022-02-10 PROCEDURE — 99214 OFFICE O/P EST MOD 30 MIN: CPT | Mod: S$PBB,,, | Performed by: PHYSICIAN ASSISTANT

## 2022-02-10 PROCEDURE — 80305 DRUG TEST PRSMV DIR OPT OBS: CPT | Mod: PBBFAC | Performed by: PHYSICIAN ASSISTANT

## 2022-02-10 PROCEDURE — 99214 OFFICE O/P EST MOD 30 MIN: CPT | Mod: PBBFAC | Performed by: PHYSICIAN ASSISTANT

## 2022-02-10 RX ORDER — OXYCODONE AND ACETAMINOPHEN 7.5; 325 MG/1; MG/1
1 TABLET ORAL EVERY 12 HOURS
Qty: 60 TABLET | Refills: 0 | Status: SHIPPED | OUTPATIENT
Start: 2022-03-16 | End: 2022-04-12 | Stop reason: SDUPTHER

## 2022-02-10 RX ORDER — OXYCODONE AND ACETAMINOPHEN 7.5; 325 MG/1; MG/1
1 TABLET ORAL EVERY 12 HOURS
Qty: 60 TABLET | Refills: 0 | Status: SHIPPED | OUTPATIENT
Start: 2022-02-14 | End: 2022-03-16

## 2022-02-10 RX ORDER — GABAPENTIN 300 MG/1
300 CAPSULE ORAL EVERY 8 HOURS
Qty: 90 CAPSULE | Refills: 1 | Status: SHIPPED | OUTPATIENT
Start: 2022-02-10 | End: 2022-04-12 | Stop reason: SDUPTHER

## 2022-03-02 PROBLEM — M25.511 RIGHT SHOULDER PAIN: Status: ACTIVE | Noted: 2021-05-17

## 2022-03-02 PROBLEM — G56.01 CARPAL TUNNEL SYNDROME OF RIGHT WRIST: Status: ACTIVE | Noted: 2022-03-02

## 2022-04-07 ENCOUNTER — OFFICE VISIT (OUTPATIENT)
Dept: RHEUMATOLOGY | Facility: CLINIC | Age: 42
End: 2022-04-07
Payer: MEDICARE

## 2022-04-07 VITALS
DIASTOLIC BLOOD PRESSURE: 86 MMHG | RESPIRATION RATE: 16 BRPM | SYSTOLIC BLOOD PRESSURE: 136 MMHG | OXYGEN SATURATION: 98 % | HEART RATE: 90 BPM

## 2022-04-07 DIAGNOSIS — M05.79 RHEUMATOID ARTHRITIS INVOLVING MULTIPLE SITES WITH POSITIVE RHEUMATOID FACTOR: Chronic | ICD-10-CM

## 2022-04-07 DIAGNOSIS — M45.0 ANKYLOSING SPONDYLITIS OF MULTIPLE SITES IN SPINE: ICD-10-CM

## 2022-04-07 DIAGNOSIS — Z71.6 TOBACCO ABUSE COUNSELING: Primary | ICD-10-CM

## 2022-04-07 PROCEDURE — 99205 PR OFFICE/OUTPT VISIT, NEW, LEVL V, 60-74 MIN: ICD-10-PCS | Mod: S$PBB,,, | Performed by: INTERNAL MEDICINE

## 2022-04-07 PROCEDURE — 99213 OFFICE O/P EST LOW 20 MIN: CPT | Mod: PBBFAC | Performed by: INTERNAL MEDICINE

## 2022-04-07 PROCEDURE — 99205 OFFICE O/P NEW HI 60 MIN: CPT | Mod: S$PBB,,, | Performed by: INTERNAL MEDICINE

## 2022-04-07 RX ORDER — METHOTREXATE 2.5 MG/1
15 TABLET ORAL WEEKLY
Qty: 72 TABLET | Refills: 0 | Status: SHIPPED | OUTPATIENT
Start: 2022-04-07 | End: 2022-07-27

## 2022-04-07 RX ORDER — FOLIC ACID 1 MG/1
1 TABLET ORAL DAILY
Qty: 100 TABLET | Refills: 1 | Status: SHIPPED | OUTPATIENT
Start: 2022-04-07 | End: 2023-02-01 | Stop reason: SDUPTHER

## 2022-04-07 NOTE — PROGRESS NOTES
Trinity Dewey MD   [facility] - RHEUMATOLOGY  [address] [address]  [phone]      PATIENT NAME: Cherry Smiley  : 1980  DATE: 22  MRN: [id number]      Billing Provider: Trinity Dewey MD  Level of Service:   Patient PCP Information     Provider PCP Type    King Griffin MD General          Reason for Visit / Chief Complaint: Rheumatoid Arthritis (C\o joint pain)           Assessment and Plan (including Health Maintenance)      Problem List  Smart Sets  Document Outside HM   :    Plan:     Tobacco abuse counseling  Comments:  In pre-contemplative phase of quitting. Did try Chantix but stopped due to nightmares and drug interaction with her seizure meds.     Ankylosing spondylitis of multiple sites in spine  -     Ambulatory referral/consult to Rheumatology  -     methotrexate 2.5 MG Tab; Take 6 tablets (15 mg total) by mouth once a week.  Dispense: 72 tablet; Refill: 0  -     folic acid (FOLVITE) 1 MG tablet; Take 1 tablet (1 mg total) by mouth once daily.  Dispense: 100 tablet; Refill: 1  -     HLA-B27 antigen; Future; Expected date: 2022    Rheumatoid arthritis involving multiple sites with positive rheumatoid factor  Comments:  DMARD naive. Diagnosed 3 years ago.   Mtx start 2022  Orders:  -     Ambulatory referral/consult to Rheumatology  -     methotrexate 2.5 MG Tab; Take 6 tablets (15 mg total) by mouth once a week.  Dispense: 72 tablet; Refill: 0  -     folic acid (FOLVITE) 1 MG tablet; Take 1 tablet (1 mg total) by mouth once daily.  Dispense: 100 tablet; Refill: 1  -     Hepatitis C Antibody; Future; Expected date: 2022  -     Hepatitis B Surface Antigen; Future; Expected date: 2022  -     Hepatitis B Surface Ab, Qualitative; Future; Expected date: 2022  -     Quantiferon Gold TB; Future; Expected date: 2022  -     Cyclic Citrullinated Peptide Antibody, IgG; Future; Expected date: 2022  -      Rheumatoid Quantitative; Future; Expected date: 04/07/2022  -     C-Reactive Protein; Future; Expected date: 04/07/2022  -     Sedimentation Rate; Future; Expected date: 04/07/2022             Total time spent in Assessment, Co-ordination of Care , Review of Care Plan , Goal Setting and Counseling on this initial visit is ~ 60 minutes     There is currently no information documented on the homunculus. Go to the Rheumatology activity and complete the homunculus joint exam.               History of Present Illness / Problem Focused Workflow     Cherry Smiley presents to the clinic with Rheumatoid Arthritis (C\o joint pain)     Dx with RA 3 years ago. Was experiencing swelling in her small joints including hands, wrists and elbows.   Also had bilateral rotator cuff injuries.   Hx of childhood traumatic fractures of the right patella and R foot. All were traumatic.   Patient states the epidural injections to the spine are not helping.   States she is unable to move when she awakens in the morning. Stiffness is severe.    who accompanies her relates that she is not able to get in and out of a car easily.   Patient is also on Percocet twice daily  No h/o DMARD initiation. States her rheumatologist has her on NSAIDs although she has not followed up since 2019.   Is active smoker - one pack per day   Significant dental cavities.   Does have h/o synovitis or swelling in her knees.   Also has h/o chest pain.       Review of Systems     Review of Systems    Medical / Social / Family History     Past Medical History:   Diagnosis Date    Allergy     Anxiety     Carpal tunnel syndrome     Cervical radiculopathy     Congenital hypothyroidism     Depressive disorder     GERD (gastroesophageal reflux disease)     Hyperlipidemia     Hypertension     Insomnia     Irritable bowel syndrome     Lumbosacral spondylosis     Osteoarthritis of knee     Primary fibromyalgia syndrome     Radiculopathy, cervical region      Seizures     Spinal stenosis in cervical region     Spondylosis     Spondylosis without myelopathy or radiculopathy, cervical region     Type 2 diabetes mellitus        Past Surgical History:   Procedure Laterality Date    ADENOIDECTOMY      EPIDURAL STEROID INJECTION  09/02/2020    C5-6 FABIANO - Dr Venegas    ESOPHAGOGASTRODUODENOSCOPY      HYSTERECTOMY      INJECTION OF FACET JOINT Left 12/27/2018    Left C6-7 Facet Injection - Deb    INJECTION OF FACET JOINT Right 05/19/2019    Right C4-7 Facet Injection X 2 -Deb    INJECTION OF FACET JOINT Left 02/27/2019    Left C4-7 Facet Injection -Deb    knee bilateral      RADIOFREQUENCY THERMOCOAGULATION Right 05/21/2019    Right C4-7 RFTC - Deb    RADIOFREQUENCY THERMOCOAGULATION Left 04/23/2019    Left C4-7 RFTC -Deb    shoulder bilateral      TONSILLECTOMY         Social History  Ms. Cherry Smiley  reports that she has been smoking. She has never used smokeless tobacco. She reports previous alcohol use. She reports that she does not use drugs.    Family History  Ms.'liliana Smiley family history includes Cancer in her brother; Diabetes in her brother and mother; Heart disease in her father and mother; Hypertension in her brother, father, and mother; Lupus in her father; Stroke in her father and mother.    Medications and Allergies     Medications  Outpatient Medications Marked as Taking for the 4/7/22 encounter (Office Visit) with Trinity Dewey MD   Medication Sig Dispense Refill    gabapentin (NEURONTIN) 300 MG capsule Take 1 capsule (300 mg total) by mouth every 8 (eight) hours. 90 capsule 1    insulin detemir U-100 (LEVEMIR FLEXTOUCH) 100 unit/mL (3 mL) SubQ InPn pen Inject into the skin every evening.      insulin NPH-insulin regular, 70/30, (NOVOLIN 70/30) 100 unit/mL (70-30) injection Inject into the skin.      levETIRAcetam (KEPPRA) 750 MG Tab Take 500 mg by mouth 2 (two) times daily.      oxyCODONE-acetaminophen (PERCOCET) 7.5-325  mg per tablet Take 1 tablet by mouth every 12 (twelve) hours. 60 tablet 0       Allergies  Review of patient's allergies indicates:   Allergen Reactions    Cinnamon analogues Anaphylaxis    Iodine Anaphylaxis    Morpholine analogues Shortness Of Breath    Shellfish containing products Anaphylaxis    Pamelor [nortriptyline]        Physical Examination     Vitals:    04/07/22 0833   BP: 136/86   Pulse: 90   Resp: 16     Physical Exam  Constitutional:       Appearance: She is obese.   HENT:      Head: Normocephalic and atraumatic.      Right Ear: External ear normal.      Left Ear: External ear normal.      Nose: Nose normal. No congestion or rhinorrhea.   Eyes:      Extraocular Movements: Extraocular movements intact.      Pupils: Pupils are equal, round, and reactive to light.   Cardiovascular:      Pulses: Normal pulses.   Pulmonary:      Effort: Pulmonary effort is normal.      Breath sounds: Normal breath sounds. No wheezing.   Chest:      Chest wall: No tenderness.   Musculoskeletal:         General: Swelling and tenderness present. Normal range of motion.      Cervical back: No rigidity or tenderness.      Comments: Multiple tender and swollen joints MCP ++   Spinal stiffness +    Lymphadenopathy:      Cervical: No cervical adenopathy.   Skin:     Findings: No rash.   Neurological:      General: No focal deficit present.      Mental Status: She is alert and oriented to person, place, and time.   Psychiatric:         Mood and Affect: Mood normal.         Thought Content: Thought content normal.                Signature:  Trinity Dewey MD  [facility] - RHEUMATOLOGY  [address] [place] [id number]  [phone]    Date of encounter: 4/7/22

## 2022-04-09 ENCOUNTER — PATIENT MESSAGE (OUTPATIENT)
Dept: RHEUMATOLOGY | Facility: CLINIC | Age: 42
End: 2022-04-09
Payer: MEDICARE

## 2022-04-12 ENCOUNTER — OFFICE VISIT (OUTPATIENT)
Dept: PAIN MEDICINE | Facility: CLINIC | Age: 42
End: 2022-04-12
Payer: MEDICARE

## 2022-04-12 VITALS
HEIGHT: 62 IN | DIASTOLIC BLOOD PRESSURE: 85 MMHG | BODY MASS INDEX: 32.76 KG/M2 | WEIGHT: 178 LBS | SYSTOLIC BLOOD PRESSURE: 149 MMHG | HEART RATE: 87 BPM

## 2022-04-12 DIAGNOSIS — G43.019 INTRACTABLE MIGRAINE WITHOUT AURA AND WITHOUT STATUS MIGRAINOSUS: Chronic | ICD-10-CM

## 2022-04-12 DIAGNOSIS — K02.9 DENTAL CARIES: ICD-10-CM

## 2022-04-12 DIAGNOSIS — M47.812 SPONDYLOSIS WITHOUT MYELOPATHY OR RADICULOPATHY, CERVICAL REGION: Chronic | ICD-10-CM

## 2022-04-12 DIAGNOSIS — M45.0 ANKYLOSING SPONDYLITIS OF MULTIPLE SITES IN SPINE: ICD-10-CM

## 2022-04-12 DIAGNOSIS — M05.79 RHEUMATOID ARTHRITIS INVOLVING MULTIPLE SITES WITH POSITIVE RHEUMATOID FACTOR: Primary | Chronic | ICD-10-CM

## 2022-04-12 DIAGNOSIS — M25.512 ACUTE PAIN OF LEFT SHOULDER: ICD-10-CM

## 2022-04-12 PROBLEM — G43.919 INTRACTABLE MIGRAINE: Chronic | Status: ACTIVE | Noted: 2022-04-12

## 2022-04-12 PROCEDURE — 99214 OFFICE O/P EST MOD 30 MIN: CPT | Mod: S$PBB,,, | Performed by: PHYSICIAN ASSISTANT

## 2022-04-12 PROCEDURE — 99214 OFFICE O/P EST MOD 30 MIN: CPT | Mod: PBBFAC | Performed by: PHYSICIAN ASSISTANT

## 2022-04-12 PROCEDURE — 99214 PR OFFICE/OUTPT VISIT, EST, LEVL IV, 30-39 MIN: ICD-10-PCS | Mod: S$PBB,,, | Performed by: PHYSICIAN ASSISTANT

## 2022-04-12 RX ORDER — OXYCODONE AND ACETAMINOPHEN 7.5; 325 MG/1; MG/1
1 TABLET ORAL EVERY 8 HOURS
Qty: 90 TABLET | Refills: 0 | Status: SHIPPED | OUTPATIENT
Start: 2022-05-15 | End: 2022-06-13 | Stop reason: SDUPTHER

## 2022-04-12 RX ORDER — GABAPENTIN 300 MG/1
300 CAPSULE ORAL EVERY 8 HOURS
Qty: 90 CAPSULE | Refills: 1 | Status: SHIPPED | OUTPATIENT
Start: 2022-04-12 | End: 2022-06-13 | Stop reason: SDUPTHER

## 2022-04-12 RX ORDER — OXYCODONE AND ACETAMINOPHEN 7.5; 325 MG/1; MG/1
1 TABLET ORAL EVERY 8 HOURS
Qty: 90 TABLET | Refills: 0 | Status: SHIPPED | OUTPATIENT
Start: 2022-04-15 | End: 2022-05-15

## 2022-04-12 NOTE — PROGRESS NOTES
Disclaimer:  This note has been generated using voice recognition software.  There may be type of graft focal areas that have been missed during a proof reading      Subjective:      Patient ID: Cherry Smiley is a 41 y.o. female.    Chief Complaint: Hip Pain, Dental Pain, and Joint Pain      Pain  This is a chronic problem. The current episode started more than 1 year ago. The problem occurs daily. The problem has been unchanged. Associated symptoms include arthralgias. Pertinent negatives include no change in bowel habit, chest pain, chills, coughing, fatigue, fever, rash, sore throat, vertigo or vomiting.     Review of Systems   Constitutional: Negative for appetite change, chills, fatigue, fever and unexpected weight change.   HENT: Negative for drooling, ear discharge, facial swelling, nosebleeds, sore throat, trouble swallowing, voice change and goiter.    Eyes: Negative for photophobia, pain, discharge, redness and visual disturbance.   Respiratory: Negative for apnea, cough, choking, chest tightness, shortness of breath, wheezing and stridor.    Cardiovascular: Negative for chest pain, palpitations and leg swelling.   Gastrointestinal: Negative for abdominal distention, change in bowel habit, diarrhea, rectal pain, vomiting, fecal incontinence and change in bowel habit.   Endocrine: Negative for cold intolerance, heat intolerance, polydipsia, polyphagia and polyuria.   Genitourinary: Negative for bladder incontinence, dysuria, flank pain, frequency and hot flashes.   Musculoskeletal: Positive for arthralgias, back pain, leg pain and neck stiffness.   Integumentary:  Negative for color change, pallor and rash.   Allergic/Immunologic: Negative for immunocompromised state.   Neurological: Negative for dizziness, vertigo, seizures, syncope, facial asymmetry, speech difficulty, light-headedness, disturbances in coordination, memory loss and coordination difficulties.   Hematological: Negative for adenopathy.  "Does not bruise/bleed easily.   Psychiatric/Behavioral: Negative for agitation, behavioral problems, confusion, decreased concentration, dysphoric mood, hallucinations, self-injury and suicidal ideas. The patient is not nervous/anxious and is not hyperactive.             Objective:  Vitals:    04/12/22 0959 04/12/22 1000   BP: (!) 149/85    Pulse: 87    Weight: 80.7 kg (178 lb)    Height: 5' 2" (1.575 m)    PainSc: 10-Worst pain ever 10-Worst pain ever         Physical Exam  Vitals and nursing note reviewed. Exam conducted with a chaperone present.   Constitutional:       General: She is awake.      Appearance: Normal appearance. She is not ill-appearing, toxic-appearing or diaphoretic.   HENT:      Head: Normocephalic and atraumatic.      Nose: Nose normal.      Mouth/Throat:      Mouth: Mucous membranes are moist.      Pharynx: Oropharynx is clear.   Eyes:      Conjunctiva/sclera: Conjunctivae normal.      Pupils: Pupils are equal, round, and reactive to light.   Cardiovascular:      Rate and Rhythm: Normal rate.   Pulmonary:      Effort: Pulmonary effort is normal. No respiratory distress.   Abdominal:      Palpations: Abdomen is soft.   Musculoskeletal:         General: Normal range of motion.      Right shoulder: Tenderness present.      Left shoulder: Tenderness present.      Right wrist: Tenderness present.      Left wrist: Tenderness present.      Cervical back: Normal range of motion and neck supple. Tenderness present.      Thoracic back: Tenderness present.      Lumbar back: Tenderness present.   Skin:     General: Skin is warm and dry.   Neurological:      General: No focal deficit present.      Mental Status: She is alert and oriented to person, place, and time. Mental status is at baseline.      Cranial Nerves: Cranial nerves are intact. No cranial nerve deficit (II-XII).   Psychiatric:         Mood and Affect: Mood normal.         Behavior: Behavior normal. Behavior is cooperative.         Thought " Content: Thought content normal.           X-Ray Elbow 2 Views Right  Narrative: EXAMINATION:  XR ELBOW 2 VIEWS RIGHT    CLINICAL HISTORY:  Pain in right elbow    COMPARISON:  None available    FINDINGS:  No evidence of fracture seen.  The alignment of the joints appears normal.  No degenerative change is present.  No soft tissue abnormality is seen.  Impression: No evidence of abnormality demonstrated    Electronically signed by: Eren James  Date:    02/08/2022  Time:    08:40  X-ray Shoulder 2 or More Views Right  Narrative: EXAMINATION:  XR SHOULDER COMPLETE 2 OR MORE VIEWS RIGHT    CLINICAL HISTORY:  Pain in right shoulder    COMPARISON:  26 November 2019    FINDINGS:  No evidence of fracture seen.  The alignment of the joints appears normal.  Mild-to-moderate acromioclavicular joint and mild glenohumeral degenerative change is present.  No soft tissue abnormality is seen.  Impression: Shoulder osteoarthrosis as described above.    Electronically signed by: Eren James  Date:    02/08/2022  Time:    08:25       Lab Visit on 04/07/2022   Component Date Value Ref Range Status    Nil Result, TB 04/07/2022 0.00   Final    TB1 Ag minus Nil Result 04/07/2022 0.02   Final    TB2 Ag minus Nil Result 04/07/2022 0.04   Final    Mitogen minus Nil Result, TB 04/07/2022 >10.00   Final    QuantiFERON-Tb Gold Plus 04/07/2022 Negative  Negative Final   Lab Visit on 04/07/2022   Component Date Value Ref Range Status    Hepatitis C Ab 04/07/2022 Reactive (A) Non-Reactive Final    Hepatitis B Surface Ag 04/07/2022 Non-Reactive  Non-Reactive Final    Hepatitis B Surface Ab 04/07/2022 Non-Reactive  Non-Reactive Final    RA Titer 04/07/2022 <10  0 - 15 IU/mL Final    CRP 04/07/2022 1.13 (A) 0.00 - 0.80 mg/dL Final    ESR Westergren 04/07/2022 12  0 - 20 mm/Hr Final    HCV RNA Detect/Quant 04/07/2022 Undetected  Undetected IU/mL Final   Office Visit on 02/10/2022   Component Date Value Ref Range Status    POC  Amphetamines 02/10/2022 Negative  Negative, Inconclusive Final    POC Barbiturates 02/10/2022 Negative  Negative, Inconclusive Final    POC Benzodiazepines 02/10/2022 Negative  Negative, Inconclusive Final    POC Cocaine 02/10/2022 Negative  Negative, Inconclusive Final    POC THC 02/10/2022 Negative  Negative, Inconclusive Final    POC Methadone 02/10/2022 Negative  Negative, Inconclusive Final    POC Methamphetamine 02/10/2022 Negative  Negative, Inconclusive Final    POC Opiates 02/10/2022 Negative  Negative, Inconclusive Final    POC Oxycodone 02/10/2022 Presumptive Positive (A) Negative, Inconclusive Final    POC Phencyclidine 02/10/2022 Negative  Negative, Inconclusive Final    POC Methylenedioxymethamphetamine * 02/10/2022 Negative  Negative, Inconclusive Final    POC Tricyclic Antidepressants 02/10/2022 Negative  Negative, Inconclusive Final    POC Buprenorphine 02/10/2022 Negative   Final     Acceptable 02/10/2022 Yes   Final    POC Temperature (Urine) 02/10/2022 94   Final   Office Visit on 11/16/2021   Component Date Value Ref Range Status    POC Amphetamines 11/16/2021 Negative  Negative, Inconclusive Final    POC Barbiturates 11/16/2021 Negative  Negative, Inconclusive Final    POC Benzodiazepines 11/16/2021 Negative  Negative, Inconclusive Final    POC Cocaine 11/16/2021 Negative  Negative, Inconclusive Final    POC THC 11/16/2021 Negative  Negative, Inconclusive Final    POC Methadone 11/16/2021 Negative  Negative, Inconclusive Final    POC Methamphetamine 11/16/2021 Negative  Negative, Inconclusive Final    POC Opiates 11/16/2021 Negative  Negative, Inconclusive Final    POC Oxycodone 11/16/2021 Presumptive Positive (A) Negative, Inconclusive Final    POC Phencyclidine 11/16/2021 Negative  Negative, Inconclusive Final    POC Methylenedioxymethamphetamine * 11/16/2021 Negative  Negative, Inconclusive Final    POC Tricyclic Antidepressants 11/16/2021 Negative   Negative, Inconclusive Final    POC Buprenorphine 11/16/2021 Negative   Final     Acceptable 11/16/2021 Yes   Final    POC Temperature (Urine) 11/16/2021 94   Final           Assessment:      1. Rheumatoid arthritis involving multiple sites with positive rheumatoid factor    2. Ankylosing spondylitis of multiple sites in spine    3. Acute pain of left shoulder    4. Spondylosis without myelopathy or radiculopathy, cervical region    5. Intractable migraine without aura and without status migrainosus    6. Dental caries          Orders Placed This Encounter   Procedures    Ambulatory referral/consult to Neurology     Standing Status:   Future     Standing Expiration Date:   5/12/2023     Referral Priority:   Routine     Referral Type:   Consultation     Referral Reason:   Specialty Services Required     Requested Specialty:   Neurology     Number of Visits Requested:   1    Ambulatory referral/consult to Dentistry     Standing Status:   Future     Standing Expiration Date:   5/12/2023     Referral Priority:   Routine     Referral Type:   Consultation     Referral Reason:   Specialty Services Required     Requested Specialty:   Dental General Practice     Number of Visits Requested:   1         A's of Opioid Risk Assessment  Activity:Patient can perform ADL.   Analgesia:Patients pain is partially controlled by current medication. Patient has tried OTC medications such as Tylenol and Ibuprofen with out relief.   Adverse Effects: Patient denies constipation or sedation.  Aberrant Behavior:  reviewed with no aberrant drug seeking/taking behavior.  Overdose reversal drug naloxone discussed    Drug screen reviewed      Requested Prescriptions     Signed Prescriptions Disp Refills    gabapentin (NEURONTIN) 300 MG capsule 90 capsule 1     Sig: Take 1 capsule (300 mg total) by mouth every 8 (eight) hours.    oxyCODONE-acetaminophen (PERCOCET) 7.5-325 mg per tablet 90 tablet 0     Sig: Take 1 tablet by  mouth every 8 (eight) hours.    oxyCODONE-acetaminophen (PERCOCET) 7.5-325 mg per tablet 90 tablet 0     Sig: Take 1 tablet by mouth every 8 (eight) hours.         Plan:    Follows rheumatology [facility] rheumatoid arthritis, ankylosing spondylitis    Complaining of jaw pain severe dental caries severe gingivitis requesting assistance with referral cannot find a local dentist to take her insurance    Referral [facility] dental program severe gingivitis, dental caries    Will increase Percocet 7.51 p.o. q.8 hours number 90 tablets    After oral pain has resolved will decrease Percocet 7.51 p.o. q.12 hours    She states she would like to continue conservative management from our standpoint    Continue home exercise program as directed    Continue current medication    Follow-up 2 months    Dr. Venegas, September 2022    Bring original prescription medication bottles/container/box with labels to each visit

## 2022-05-09 ENCOUNTER — OFFICE VISIT (OUTPATIENT)
Dept: RHEUMATOLOGY | Facility: CLINIC | Age: 42
DRG: 247 | End: 2022-05-09
Payer: MEDICARE

## 2022-05-09 ENCOUNTER — HOSPITAL ENCOUNTER (INPATIENT)
Facility: HOSPITAL | Age: 42
LOS: 2 days | Discharge: HOME | DRG: 247 | End: 2022-05-11
Attending: HOSPITALIST | Admitting: HOSPITALIST
Payer: MEDICARE

## 2022-05-09 VITALS — SYSTOLIC BLOOD PRESSURE: 178 MMHG | DIASTOLIC BLOOD PRESSURE: 110 MMHG | HEART RATE: 108 BPM | OXYGEN SATURATION: 99 %

## 2022-05-09 DIAGNOSIS — G47.33 OSA (OBSTRUCTIVE SLEEP APNEA): ICD-10-CM

## 2022-05-09 DIAGNOSIS — Z79.4 TYPE 2 DIABETES MELLITUS WITH HYPERGLYCEMIA, WITH LONG-TERM CURRENT USE OF INSULIN: ICD-10-CM

## 2022-05-09 DIAGNOSIS — E11.65 TYPE 2 DIABETES MELLITUS WITH HYPERGLYCEMIA, WITH LONG-TERM CURRENT USE OF INSULIN: ICD-10-CM

## 2022-05-09 DIAGNOSIS — M05.79 RHEUMATOID ARTHRITIS INVOLVING MULTIPLE SITES WITH POSITIVE RHEUMATOID FACTOR: Primary | Chronic | ICD-10-CM

## 2022-05-09 DIAGNOSIS — I21.4 NSTEMI (NON-ST ELEVATED MYOCARDIAL INFARCTION): Primary | ICD-10-CM

## 2022-05-09 DIAGNOSIS — E11.69 TYPE 2 DIABETES MELLITUS WITH OTHER SPECIFIED COMPLICATION, UNSPECIFIED WHETHER LONG TERM INSULIN USE: ICD-10-CM

## 2022-05-09 DIAGNOSIS — R56.9 SEIZURES: ICD-10-CM

## 2022-05-09 DIAGNOSIS — R94.31 ABNORMAL EKG: ICD-10-CM

## 2022-05-09 DIAGNOSIS — M05.79 RHEUMATOID ARTHRITIS INVOLVING MULTIPLE SITES WITH POSITIVE RHEUMATOID FACTOR: Chronic | ICD-10-CM

## 2022-05-09 LAB
ALBUMIN SERPL BCP-MCNC: 3.5 G/DL (ref 3.5–5)
ALBUMIN/GLOB SERPL: 1 {RATIO}
ALP SERPL-CCNC: 95 U/L (ref 37–98)
ALT SERPL W P-5'-P-CCNC: 22 U/L (ref 13–56)
ANION GAP SERPL CALCULATED.3IONS-SCNC: 15 MMOL/L (ref 7–16)
APTT PPP: 34.3 SECONDS (ref 25.2–37.3)
AST SERPL W P-5'-P-CCNC: 10 U/L (ref 15–37)
BASOPHILS # BLD AUTO: 0.07 K/UL (ref 0–0.2)
BASOPHILS NFR BLD AUTO: 0.6 % (ref 0–1)
BILIRUB SERPL-MCNC: 0.4 MG/DL (ref 0–1.2)
BILIRUB UR QL STRIP: NEGATIVE
BUN SERPL-MCNC: 10 MG/DL (ref 7–18)
BUN/CREAT SERPL: 11 (ref 6–20)
CALCIUM SERPL-MCNC: 9.1 MG/DL (ref 8.5–10.1)
CHLORIDE SERPL-SCNC: 97 MMOL/L (ref 98–107)
CLARITY UR: CLEAR
CO2 SERPL-SCNC: 25 MMOL/L (ref 21–32)
COLOR UR: YELLOW
CREAT SERPL-MCNC: 0.89 MG/DL (ref 0.55–1.02)
DIFFERENTIAL METHOD BLD: ABNORMAL
EOSINOPHIL # BLD AUTO: 0.28 K/UL (ref 0–0.5)
EOSINOPHIL NFR BLD AUTO: 2.3 % (ref 1–4)
ERYTHROCYTE [DISTWIDTH] IN BLOOD BY AUTOMATED COUNT: 12.3 % (ref 11.5–14.5)
FLUAV AG UPPER RESP QL IA.RAPID: NEGATIVE
FLUBV AG UPPER RESP QL IA.RAPID: NEGATIVE
GLOBULIN SER-MCNC: 3.5 G/DL (ref 2–4)
GLUCOSE SERPL-MCNC: 306 MG/DL (ref 70–105)
GLUCOSE SERPL-MCNC: 360 MG/DL (ref 74–106)
GLUCOSE UR STRIP-MCNC: >=1000 MG/DL
HCT VFR BLD AUTO: 43.8 % (ref 38–47)
HGB BLD-MCNC: 15.1 G/DL (ref 12–16)
IMM GRANULOCYTES # BLD AUTO: 0.04 K/UL (ref 0–0.04)
IMM GRANULOCYTES NFR BLD: 0.3 % (ref 0–0.4)
INR BLD: 0.97 (ref 0.9–1.1)
KETONES UR STRIP-SCNC: NEGATIVE MG/DL
LEUKOCYTE ESTERASE UR QL STRIP: NEGATIVE
LYMPHOCYTES # BLD AUTO: 3.74 K/UL (ref 1–4.8)
LYMPHOCYTES NFR BLD AUTO: 30.7 % (ref 27–41)
MAGNESIUM SERPL-MCNC: 1.7 MG/DL (ref 1.7–2.3)
MCH RBC QN AUTO: 31.7 PG (ref 27–31)
MCHC RBC AUTO-ENTMCNC: 34.5 G/DL (ref 32–36)
MCV RBC AUTO: 91.8 FL (ref 80–96)
MONOCYTES # BLD AUTO: 0.64 K/UL (ref 0–0.8)
MONOCYTES NFR BLD AUTO: 5.3 % (ref 2–6)
MPC BLD CALC-MCNC: 10.6 FL (ref 9.4–12.4)
NEUTROPHILS # BLD AUTO: 7.4 K/UL (ref 1.8–7.7)
NEUTROPHILS NFR BLD AUTO: 60.8 % (ref 53–65)
NITRITE UR QL STRIP: NEGATIVE
NRBC # BLD AUTO: 0 X10E3/UL
NRBC, AUTO (.00): 0 %
NT-PROBNP SERPL-MCNC: 917 PG/ML (ref 1–125)
PH UR STRIP: 6 PH UNITS
PLATELET # BLD AUTO: 295 K/UL (ref 150–400)
POTASSIUM SERPL-SCNC: 3.8 MMOL/L (ref 3.5–5.1)
PROT SERPL-MCNC: 7 G/DL (ref 6.4–8.2)
PROT UR QL STRIP: NEGATIVE
PROTHROMBIN TIME: 12.9 SECONDS (ref 11.7–14.7)
RBC # BLD AUTO: 4.77 M/UL (ref 4.2–5.4)
RBC # UR STRIP: NEGATIVE /UL
SARS-COV+SARS-COV-2 AG RESP QL IA.RAPID: NEGATIVE
SODIUM SERPL-SCNC: 133 MMOL/L (ref 136–145)
SP GR UR STRIP: 1.01
TROPONIN I SERPL HS-MCNC: 485.5 PG/ML
UROBILINOGEN UR STRIP-ACNC: 0.2 MG/DL
WBC # BLD AUTO: 12.17 K/UL (ref 4.5–11)

## 2022-05-09 PROCEDURE — 96374 THER/PROPH/DIAG INJ IV PUSH: CPT

## 2022-05-09 PROCEDURE — C9600 PERC DRUG-EL COR STENT SING: HCPCS | Mod: LC | Performed by: STUDENT IN AN ORGANIZED HEALTH CARE EDUCATION/TRAINING PROGRAM

## 2022-05-09 PROCEDURE — 93571 IV DOP VEL&/PRESS C FLO 1ST: CPT | Performed by: STUDENT IN AN ORGANIZED HEALTH CARE EDUCATION/TRAINING PROGRAM

## 2022-05-09 PROCEDURE — 11000001 HC ACUTE MED/SURG PRIVATE ROOM

## 2022-05-09 PROCEDURE — C1713 ANCHOR/SCREW BN/BN,TIS/BN: HCPCS | Performed by: STUDENT IN AN ORGANIZED HEALTH CARE EDUCATION/TRAINING PROGRAM

## 2022-05-09 PROCEDURE — 99152 MOD SED SAME PHYS/QHP 5/>YRS: CPT | Performed by: STUDENT IN AN ORGANIZED HEALTH CARE EDUCATION/TRAINING PROGRAM

## 2022-05-09 PROCEDURE — 25500020 PHARM REV CODE 255: Performed by: STUDENT IN AN ORGANIZED HEALTH CARE EDUCATION/TRAINING PROGRAM

## 2022-05-09 PROCEDURE — 25000242 PHARM REV CODE 250 ALT 637 W/ HCPCS: Performed by: STUDENT IN AN ORGANIZED HEALTH CARE EDUCATION/TRAINING PROGRAM

## 2022-05-09 PROCEDURE — 92928 PRQ TCAT PLMT NTRAC ST 1 LES: CPT | Mod: LC,,, | Performed by: STUDENT IN AN ORGANIZED HEALTH CARE EDUCATION/TRAINING PROGRAM

## 2022-05-09 PROCEDURE — C1874 STENT, COATED/COV W/DEL SYS: HCPCS | Performed by: STUDENT IN AN ORGANIZED HEALTH CARE EDUCATION/TRAINING PROGRAM

## 2022-05-09 PROCEDURE — 99215 OFFICE O/P EST HI 40 MIN: CPT | Mod: S$PBB,,, | Performed by: INTERNAL MEDICINE

## 2022-05-09 PROCEDURE — C1894 INTRO/SHEATH, NON-LASER: HCPCS | Performed by: STUDENT IN AN ORGANIZED HEALTH CARE EDUCATION/TRAINING PROGRAM

## 2022-05-09 PROCEDURE — 83735 ASSAY OF MAGNESIUM: CPT | Performed by: NURSE PRACTITIONER

## 2022-05-09 PROCEDURE — 85610 PROTHROMBIN TIME: CPT | Performed by: NURSE PRACTITIONER

## 2022-05-09 PROCEDURE — 99153 MOD SED SAME PHYS/QHP EA: CPT | Performed by: STUDENT IN AN ORGANIZED HEALTH CARE EDUCATION/TRAINING PROGRAM

## 2022-05-09 PROCEDURE — 99285 EMERGENCY DEPT VISIT HI MDM: CPT | Mod: 25,CS

## 2022-05-09 PROCEDURE — 81003 URINALYSIS AUTO W/O SCOPE: CPT | Performed by: NURSE PRACTITIONER

## 2022-05-09 PROCEDURE — C1760 CLOSURE DEV, VASC: HCPCS | Performed by: STUDENT IN AN ORGANIZED HEALTH CARE EDUCATION/TRAINING PROGRAM

## 2022-05-09 PROCEDURE — 99285 PR EMERGENCY DEPT VISIT,LEVEL V: ICD-10-PCS | Mod: 25,,, | Performed by: STUDENT IN AN ORGANIZED HEALTH CARE EDUCATION/TRAINING PROGRAM

## 2022-05-09 PROCEDURE — C1887 CATHETER, GUIDING: HCPCS | Performed by: STUDENT IN AN ORGANIZED HEALTH CARE EDUCATION/TRAINING PROGRAM

## 2022-05-09 PROCEDURE — 99223 1ST HOSP IP/OBS HIGH 75: CPT | Mod: AI,,, | Performed by: HOSPITALIST

## 2022-05-09 PROCEDURE — 93571 IV DOP VEL&/PRESS C FLO 1ST: CPT | Mod: 26,52,, | Performed by: STUDENT IN AN ORGANIZED HEALTH CARE EDUCATION/TRAINING PROGRAM

## 2022-05-09 PROCEDURE — 63600175 PHARM REV CODE 636 W HCPCS: Performed by: STUDENT IN AN ORGANIZED HEALTH CARE EDUCATION/TRAINING PROGRAM

## 2022-05-09 PROCEDURE — 93005 ELECTROCARDIOGRAM TRACING: CPT

## 2022-05-09 PROCEDURE — 99285 PR EMERGENCY DEPT VISIT,LEVEL V: ICD-10-PCS | Mod: ,,, | Performed by: NURSE PRACTITIONER

## 2022-05-09 PROCEDURE — 84484 ASSAY OF TROPONIN QUANT: CPT | Performed by: EMERGENCY MEDICINE

## 2022-05-09 PROCEDURE — 83880 ASSAY OF NATRIURETIC PEPTIDE: CPT | Performed by: NURSE PRACTITIONER

## 2022-05-09 PROCEDURE — C1725 CATH, TRANSLUMIN NON-LASER: HCPCS | Performed by: STUDENT IN AN ORGANIZED HEALTH CARE EDUCATION/TRAINING PROGRAM

## 2022-05-09 PROCEDURE — 85730 THROMBOPLASTIN TIME PARTIAL: CPT | Performed by: NURSE PRACTITIONER

## 2022-05-09 PROCEDURE — 93458 L HRT ARTERY/VENTRICLE ANGIO: CPT | Performed by: STUDENT IN AN ORGANIZED HEALTH CARE EDUCATION/TRAINING PROGRAM

## 2022-05-09 PROCEDURE — 63600175 PHARM REV CODE 636 W HCPCS

## 2022-05-09 PROCEDURE — 25000003 PHARM REV CODE 250: Performed by: HOSPITALIST

## 2022-05-09 PROCEDURE — 82962 GLUCOSE BLOOD TEST: CPT

## 2022-05-09 PROCEDURE — 96375 TX/PRO/DX INJ NEW DRUG ADDON: CPT

## 2022-05-09 PROCEDURE — 25000003 PHARM REV CODE 250: Performed by: STUDENT IN AN ORGANIZED HEALTH CARE EDUCATION/TRAINING PROGRAM

## 2022-05-09 PROCEDURE — 99223 PR INITIAL HOSPITAL CARE,LEVL III: ICD-10-PCS | Mod: AI,,, | Performed by: HOSPITALIST

## 2022-05-09 PROCEDURE — 93010 EKG 12-LEAD: ICD-10-PCS | Mod: ,,, | Performed by: STUDENT IN AN ORGANIZED HEALTH CARE EDUCATION/TRAINING PROGRAM

## 2022-05-09 PROCEDURE — 63600175 PHARM REV CODE 636 W HCPCS: Performed by: HOSPITALIST

## 2022-05-09 PROCEDURE — 99213 OFFICE O/P EST LOW 20 MIN: CPT | Mod: PBBFAC,25 | Performed by: INTERNAL MEDICINE

## 2022-05-09 PROCEDURE — 63600175 PHARM REV CODE 636 W HCPCS: Performed by: NURSE PRACTITIONER

## 2022-05-09 PROCEDURE — 93458 L HRT ARTERY/VENTRICLE ANGIO: CPT | Mod: 26,XU,, | Performed by: STUDENT IN AN ORGANIZED HEALTH CARE EDUCATION/TRAINING PROGRAM

## 2022-05-09 PROCEDURE — 25000003 PHARM REV CODE 250: Performed by: NURSE PRACTITIONER

## 2022-05-09 PROCEDURE — 87428 SARSCOV & INF VIR A&B AG IA: CPT | Performed by: HOSPITALIST

## 2022-05-09 PROCEDURE — 36140 INTRO NDL ICATH UPR/LXTR ART: CPT | Mod: XS,,, | Performed by: STUDENT IN AN ORGANIZED HEALTH CARE EDUCATION/TRAINING PROGRAM

## 2022-05-09 PROCEDURE — C9601 PERC DRUG-EL COR STENT BRAN: HCPCS | Mod: LC | Performed by: STUDENT IN AN ORGANIZED HEALTH CARE EDUCATION/TRAINING PROGRAM

## 2022-05-09 PROCEDURE — 99285 EMERGENCY DEPT VISIT HI MDM: CPT | Mod: ,,, | Performed by: NURSE PRACTITIONER

## 2022-05-09 PROCEDURE — C1769 GUIDE WIRE: HCPCS | Performed by: STUDENT IN AN ORGANIZED HEALTH CARE EDUCATION/TRAINING PROGRAM

## 2022-05-09 PROCEDURE — 36415 COLL VENOUS BLD VENIPUNCTURE: CPT | Performed by: NURSE PRACTITIONER

## 2022-05-09 PROCEDURE — 27000080 OPTIME MED/SURG SUP & DEVICES GENERAL CLASSIFICATION: Performed by: STUDENT IN AN ORGANIZED HEALTH CARE EDUCATION/TRAINING PROGRAM

## 2022-05-09 PROCEDURE — 80053 COMPREHEN METABOLIC PANEL: CPT | Performed by: EMERGENCY MEDICINE

## 2022-05-09 PROCEDURE — 85347 COAGULATION TIME ACTIVATED: CPT

## 2022-05-09 PROCEDURE — 27800903 OPTIME MED/SURG SUP & DEVICES OTHER IMPLANTS: Performed by: STUDENT IN AN ORGANIZED HEALTH CARE EDUCATION/TRAINING PROGRAM

## 2022-05-09 PROCEDURE — 25000003 PHARM REV CODE 250

## 2022-05-09 PROCEDURE — C9399 UNCLASSIFIED DRUGS OR BIOLOG: HCPCS | Performed by: HOSPITALIST

## 2022-05-09 PROCEDURE — 85025 COMPLETE CBC W/AUTO DIFF WBC: CPT | Performed by: EMERGENCY MEDICINE

## 2022-05-09 PROCEDURE — 92928 PR STENT: ICD-10-PCS | Mod: LC,,, | Performed by: STUDENT IN AN ORGANIZED HEALTH CARE EDUCATION/TRAINING PROGRAM

## 2022-05-09 PROCEDURE — 36140 INTRO NDL ICATH UPR/LXTR ART: CPT | Mod: XS | Performed by: STUDENT IN AN ORGANIZED HEALTH CARE EDUCATION/TRAINING PROGRAM

## 2022-05-09 PROCEDURE — 27201423 OPTIME MED/SURG SUP & DEVICES STERILE SUPPLY: Performed by: STUDENT IN AN ORGANIZED HEALTH CARE EDUCATION/TRAINING PROGRAM

## 2022-05-09 PROCEDURE — 93010 ELECTROCARDIOGRAM REPORT: CPT | Mod: ,,, | Performed by: STUDENT IN AN ORGANIZED HEALTH CARE EDUCATION/TRAINING PROGRAM

## 2022-05-09 PROCEDURE — 93010 ELECTROCARDIOGRAM REPORT: CPT | Mod: 59,,, | Performed by: STUDENT IN AN ORGANIZED HEALTH CARE EDUCATION/TRAINING PROGRAM

## 2022-05-09 PROCEDURE — 96361 HYDRATE IV INFUSION ADD-ON: CPT

## 2022-05-09 PROCEDURE — 93571 PR HEART FLOW RESERV MEASURE,INIT VESSL: ICD-10-PCS | Mod: 26,52,, | Performed by: STUDENT IN AN ORGANIZED HEALTH CARE EDUCATION/TRAINING PROGRAM

## 2022-05-09 PROCEDURE — 99215 PR OFFICE/OUTPT VISIT, EST, LEVL V, 40-54 MIN: ICD-10-PCS | Mod: S$PBB,,, | Performed by: INTERNAL MEDICINE

## 2022-05-09 PROCEDURE — 93458 PR CATH PLACE/CORON ANGIO, IMG SUPER/INTERP,W LEFT HEART VENTRICULOGRAPHY: ICD-10-PCS | Mod: 26,XU,, | Performed by: STUDENT IN AN ORGANIZED HEALTH CARE EDUCATION/TRAINING PROGRAM

## 2022-05-09 PROCEDURE — 36140 PR PLACE CATH EXTREM ARTERY: ICD-10-PCS | Mod: XS,,, | Performed by: STUDENT IN AN ORGANIZED HEALTH CARE EDUCATION/TRAINING PROGRAM

## 2022-05-09 PROCEDURE — 99285 EMERGENCY DEPT VISIT HI MDM: CPT | Mod: 25,,, | Performed by: STUDENT IN AN ORGANIZED HEALTH CARE EDUCATION/TRAINING PROGRAM

## 2022-05-09 DEVICE — XIENCE SKYPOINT™ EVEROLIMUS ELUTING CORONARY STENT SYSTEM 2.25 MM X 12 MM / RAPID-EXCHANGE
Type: IMPLANTABLE DEVICE | Site: CORONARY | Status: FUNCTIONAL
Brand: XIENCE SKYPOINT™

## 2022-05-09 DEVICE — XIENCE SKYPOINT™ EVEROLIMUS ELUTING CORONARY STENT SYSTEM 2.75 MM X 33 MM / RAPID-EXCHANGE
Type: IMPLANTABLE DEVICE | Site: CORONARY | Status: FUNCTIONAL
Brand: XIENCE SKYPOINT™

## 2022-05-09 RX ORDER — HEPARIN SODIUM 1000 [USP'U]/ML
INJECTION, SOLUTION INTRAVENOUS; SUBCUTANEOUS
Status: DISCONTINUED | OUTPATIENT
Start: 2022-05-09 | End: 2022-05-09 | Stop reason: HOSPADM

## 2022-05-09 RX ORDER — HEPARIN SODIUM 5000 [USP'U]/ML
4000 INJECTION, SOLUTION INTRAVENOUS; SUBCUTANEOUS ONCE
Status: COMPLETED | OUTPATIENT
Start: 2022-05-09 | End: 2022-05-09

## 2022-05-09 RX ORDER — ONDANSETRON 4 MG/1
8 TABLET, ORALLY DISINTEGRATING ORAL EVERY 8 HOURS PRN
Status: DISCONTINUED | OUTPATIENT
Start: 2022-05-09 | End: 2022-05-11 | Stop reason: HOSPADM

## 2022-05-09 RX ORDER — HEPARIN SODIUM 5000 [USP'U]/ML
INJECTION, SOLUTION INTRAVENOUS; SUBCUTANEOUS
Status: COMPLETED
Start: 2022-05-09 | End: 2022-05-09

## 2022-05-09 RX ORDER — GABAPENTIN 300 MG/1
300 CAPSULE ORAL EVERY 8 HOURS
Status: DISCONTINUED | OUTPATIENT
Start: 2022-05-10 | End: 2022-05-10

## 2022-05-09 RX ORDER — DIPHENHYDRAMINE HYDROCHLORIDE 50 MG/ML
INJECTION INTRAMUSCULAR; INTRAVENOUS
Status: DISCONTINUED | OUTPATIENT
Start: 2022-05-09 | End: 2022-05-09 | Stop reason: HOSPADM

## 2022-05-09 RX ORDER — MORPHINE SULFATE 4 MG/ML
4 INJECTION, SOLUTION INTRAMUSCULAR; INTRAVENOUS
Status: DISCONTINUED | OUTPATIENT
Start: 2022-05-09 | End: 2022-05-09

## 2022-05-09 RX ORDER — ACETAMINOPHEN 325 MG/1
650 TABLET ORAL EVERY 4 HOURS PRN
Status: DISCONTINUED | OUTPATIENT
Start: 2022-05-09 | End: 2022-05-11 | Stop reason: HOSPADM

## 2022-05-09 RX ORDER — GUAIFENESIN/DEXTROMETHORPHAN 100-10MG/5
10 SYRUP ORAL EVERY 6 HOURS PRN
Status: DISCONTINUED | OUTPATIENT
Start: 2022-05-09 | End: 2022-05-11 | Stop reason: HOSPADM

## 2022-05-09 RX ORDER — ONDANSETRON 2 MG/ML
4 INJECTION INTRAMUSCULAR; INTRAVENOUS
Status: COMPLETED | OUTPATIENT
Start: 2022-05-09 | End: 2022-05-09

## 2022-05-09 RX ORDER — ASPIRIN 325 MG
325 TABLET ORAL DAILY
Status: DISCONTINUED | OUTPATIENT
Start: 2022-05-10 | End: 2022-05-09

## 2022-05-09 RX ORDER — METOPROLOL TARTRATE 1 MG/ML
INJECTION, SOLUTION INTRAVENOUS
Status: COMPLETED
Start: 2022-05-09 | End: 2022-05-09

## 2022-05-09 RX ORDER — ACETAMINOPHEN 500 MG
1000 TABLET ORAL EVERY 6 HOURS PRN
Status: DISCONTINUED | OUTPATIENT
Start: 2022-05-09 | End: 2022-05-09

## 2022-05-09 RX ORDER — NITROGLYCERIN 0.4 MG/1
TABLET SUBLINGUAL
Status: DISCONTINUED | OUTPATIENT
Start: 2022-05-09 | End: 2022-05-09 | Stop reason: HOSPADM

## 2022-05-09 RX ORDER — ASPIRIN 81 MG/1
81 TABLET ORAL DAILY
Status: DISCONTINUED | OUTPATIENT
Start: 2022-05-10 | End: 2022-05-11 | Stop reason: HOSPADM

## 2022-05-09 RX ORDER — HEPARIN SODIUM 5000 [USP'U]/ML
4000 INJECTION, SOLUTION INTRAVENOUS; SUBCUTANEOUS EVERY 8 HOURS
Status: DISCONTINUED | OUTPATIENT
Start: 2022-05-09 | End: 2022-05-09

## 2022-05-09 RX ORDER — SIMETHICONE 80 MG
1 TABLET,CHEWABLE ORAL 3 TIMES DAILY PRN
Status: DISCONTINUED | OUTPATIENT
Start: 2022-05-09 | End: 2022-05-11 | Stop reason: HOSPADM

## 2022-05-09 RX ORDER — METOPROLOL TARTRATE 1 MG/ML
5 INJECTION, SOLUTION INTRAVENOUS
Status: COMPLETED | OUTPATIENT
Start: 2022-05-09 | End: 2022-05-09

## 2022-05-09 RX ORDER — ONDANSETRON 2 MG/ML
8 INJECTION INTRAMUSCULAR; INTRAVENOUS EVERY 6 HOURS PRN
Status: DISCONTINUED | OUTPATIENT
Start: 2022-05-09 | End: 2022-05-09

## 2022-05-09 RX ORDER — IBUPROFEN 200 MG
16 TABLET ORAL
Status: DISCONTINUED | OUTPATIENT
Start: 2022-05-09 | End: 2022-05-11 | Stop reason: HOSPADM

## 2022-05-09 RX ORDER — GLUCAGON 1 MG
1 KIT INJECTION
Status: DISCONTINUED | OUTPATIENT
Start: 2022-05-09 | End: 2022-05-11 | Stop reason: HOSPADM

## 2022-05-09 RX ORDER — LEVETIRACETAM 500 MG/1
500 TABLET ORAL 2 TIMES DAILY
Status: DISCONTINUED | OUTPATIENT
Start: 2022-05-09 | End: 2022-05-10

## 2022-05-09 RX ORDER — BISACODYL 5 MG
10 TABLET, DELAYED RELEASE (ENTERIC COATED) ORAL DAILY PRN
Status: DISCONTINUED | OUTPATIENT
Start: 2022-05-09 | End: 2022-05-11 | Stop reason: HOSPADM

## 2022-05-09 RX ORDER — IBUPROFEN 200 MG
24 TABLET ORAL
Status: DISCONTINUED | OUTPATIENT
Start: 2022-05-09 | End: 2022-05-11 | Stop reason: HOSPADM

## 2022-05-09 RX ORDER — ATORVASTATIN CALCIUM 80 MG/1
80 TABLET, FILM COATED ORAL DAILY
Status: DISCONTINUED | OUTPATIENT
Start: 2022-05-10 | End: 2022-05-10

## 2022-05-09 RX ORDER — METOPROLOL SUCCINATE 25 MG/1
25 TABLET, EXTENDED RELEASE ORAL DAILY
Status: DISCONTINUED | OUTPATIENT
Start: 2022-05-10 | End: 2022-05-11 | Stop reason: HOSPADM

## 2022-05-09 RX ORDER — TRAZODONE HYDROCHLORIDE 50 MG/1
50 TABLET ORAL NIGHTLY PRN
Status: DISCONTINUED | OUTPATIENT
Start: 2022-05-09 | End: 2022-05-11 | Stop reason: HOSPADM

## 2022-05-09 RX ORDER — SODIUM CHLORIDE 450 MG/100ML
INJECTION, SOLUTION INTRAVENOUS
Status: DISCONTINUED | OUTPATIENT
Start: 2022-05-09 | End: 2022-05-11 | Stop reason: HOSPADM

## 2022-05-09 RX ORDER — MIDAZOLAM HYDROCHLORIDE 1 MG/ML
INJECTION INTRAMUSCULAR; INTRAVENOUS
Status: DISCONTINUED | OUTPATIENT
Start: 2022-05-09 | End: 2022-05-09 | Stop reason: HOSPADM

## 2022-05-09 RX ORDER — METHYLPREDNISOLONE SOD SUCC 125 MG
125 VIAL (EA) INJECTION
Status: COMPLETED | OUTPATIENT
Start: 2022-05-09 | End: 2022-05-09

## 2022-05-09 RX ORDER — FENTANYL CITRATE 50 UG/ML
INJECTION, SOLUTION INTRAMUSCULAR; INTRAVENOUS
Status: DISCONTINUED | OUTPATIENT
Start: 2022-05-09 | End: 2022-05-09 | Stop reason: HOSPADM

## 2022-05-09 RX ORDER — ASPIRIN 325 MG
325 TABLET ORAL ONCE
Status: COMPLETED | OUTPATIENT
Start: 2022-05-09 | End: 2022-05-09

## 2022-05-09 RX ORDER — LIDOCAINE HYDROCHLORIDE 10 MG/ML
INJECTION, SOLUTION EPIDURAL; INFILTRATION; INTRACAUDAL; PERINEURAL
Status: DISCONTINUED | OUTPATIENT
Start: 2022-05-09 | End: 2022-05-09 | Stop reason: HOSPADM

## 2022-05-09 RX ORDER — OXYCODONE AND ACETAMINOPHEN 5; 325 MG/1; MG/1
2 TABLET ORAL EVERY 8 HOURS PRN
Status: DISCONTINUED | OUTPATIENT
Start: 2022-05-09 | End: 2022-05-11 | Stop reason: HOSPADM

## 2022-05-09 RX ORDER — HYDRALAZINE HYDROCHLORIDE 20 MG/ML
10 INJECTION INTRAMUSCULAR; INTRAVENOUS
Status: COMPLETED | OUTPATIENT
Start: 2022-05-09 | End: 2022-05-09

## 2022-05-09 RX ORDER — INSULIN ASPART 100 [IU]/ML
1-10 INJECTION, SOLUTION INTRAVENOUS; SUBCUTANEOUS
Status: DISCONTINUED | OUTPATIENT
Start: 2022-05-09 | End: 2022-05-11 | Stop reason: HOSPADM

## 2022-05-09 RX ORDER — HEPARIN SOD,PORCINE/0.9 % NACL 1000/500ML
INTRAVENOUS SOLUTION INTRAVENOUS
Status: DISCONTINUED | OUTPATIENT
Start: 2022-05-09 | End: 2022-05-09 | Stop reason: HOSPADM

## 2022-05-09 RX ORDER — METHOTREXATE 2.5 MG/1
15 TABLET ORAL WEEKLY
Status: DISCONTINUED | OUTPATIENT
Start: 2022-05-11 | End: 2022-05-11 | Stop reason: HOSPADM

## 2022-05-09 RX ORDER — NITROGLYCERIN 5 MG/ML
INJECTION, SOLUTION INTRAVENOUS
Status: DISCONTINUED | OUTPATIENT
Start: 2022-05-09 | End: 2022-05-09 | Stop reason: HOSPADM

## 2022-05-09 RX ADMIN — INSULIN ASPART 4 UNITS: 100 INJECTION, SOLUTION INTRAVENOUS; SUBCUTANEOUS at 11:05

## 2022-05-09 RX ADMIN — ONDANSETRON 4 MG: 2 INJECTION INTRAMUSCULAR; INTRAVENOUS at 06:05

## 2022-05-09 RX ADMIN — SODIUM CHLORIDE 1000 ML: 9 INJECTION, SOLUTION INTRAVENOUS at 05:05

## 2022-05-09 RX ADMIN — ASPIRIN 325 MG ORAL TABLET 325 MG: 325 PILL ORAL at 06:05

## 2022-05-09 RX ADMIN — OXYCODONE AND ACETAMINOPHEN 2 TABLET: 5; 325 TABLET ORAL at 11:05

## 2022-05-09 RX ADMIN — METHYLPREDNISOLONE SODIUM SUCCINATE 125 MG: 125 INJECTION, POWDER, FOR SOLUTION INTRAMUSCULAR; INTRAVENOUS at 06:05

## 2022-05-09 RX ADMIN — LEVETIRACETAM 500 MG: 500 TABLET, FILM COATED ORAL at 11:05

## 2022-05-09 RX ADMIN — METOPROLOL TARTRATE 5 MG: 1 INJECTION, SOLUTION INTRAVENOUS at 06:05

## 2022-05-09 RX ADMIN — HEPARIN SODIUM 4000 UNITS: 5000 INJECTION INTRAVENOUS; SUBCUTANEOUS at 06:05

## 2022-05-09 RX ADMIN — HEPARIN SODIUM 4000 UNITS: 5000 INJECTION, SOLUTION INTRAVENOUS; SUBCUTANEOUS at 06:05

## 2022-05-09 RX ADMIN — INSULIN DETEMIR 25 UNITS: 100 INJECTION, SOLUTION SUBCUTANEOUS at 11:05

## 2022-05-09 RX ADMIN — TRAZODONE HYDROCHLORIDE 50 MG: 50 TABLET ORAL at 11:05

## 2022-05-09 RX ADMIN — HYDRALAZINE HYDROCHLORIDE 10 MG: 20 INJECTION INTRAMUSCULAR; INTRAVENOUS at 05:05

## 2022-05-09 RX ADMIN — NITROGLYCERIN 0.5 INCH: 20 OINTMENT TOPICAL at 06:05

## 2022-05-09 RX ADMIN — METOPROLOL TARTRATE 5 MG: 5 INJECTION, SOLUTION INTRAVENOUS at 06:05

## 2022-05-09 NOTE — Clinical Note
The catheter was removed from the ostium   right coronary artery. An angiography was performed of the right coronary arteries. Multiple views were taken.

## 2022-05-09 NOTE — PROGRESS NOTES
Trinity Dewey MD   [facility] - RHEUMATOLOGY  [address] [place] [address]  [phone]      PATIENT NAME: Cherry Smiley  : 1980  DATE: 22  MRN: [id number]      Billing Provider: Trinity Dewey MD  Level of Service:   Patient PCP Information     Provider PCP Type    King Griffin MD General          Reason for Visit / Chief Complaint: Follow-up (Follow up)           Assessment and Plan (including Health Maintenance)      Problem List  Smart Sets  Document Outside HM   :    Plan:     Rheumatoid arthritis involving multiple sites with positive rheumatoid factor     Patient states she is tolerating mtx.   However, today presented with chest pain which in intermittent and started 3 days ago. Patient relates severe stress at home related to daughter's pregnancy.   CP is in setting of /110 with radiation to left shoulder and numbness down left arm.   Patient is advised to immediately go to ED. May need cardiac evaluation.   Should continue methotrexate upon discharge and set up rheumatology followup after cardiac workup.   Patient is also Hep C antibody positive and viral RNA titers are unknown.           Total time spent in Assessment, Co-ordination of Care , Review of Care Plan , Goal Setting and Counseling on this initial visit is ~ 60 minutes     There is currently no information documented on the homunculus. Go to the Rheumatology activity and complete the homunculus joint exam.               History of Present Illness / Problem Focused Workflow     Cherry Smiley presents to the clinic with Follow-up (Follow up)     Dx with RA 3 years ago. Was experiencing swelling in her small joints including hands, wrists and elbows.   Also had bilateral rotator cuff injuries.   Hx of childhood traumatic fractures of the right patella and R foot. All were traumatic.   Patient states the epidural injections to the spine are not helping.   States she is unable to  move when she awakens in the morning. Stiffness is severe.    who accompanies her relates that she is not able to get in and out of a car easily.   Patient is also on Percocet twice daily  No h/o DMARD initiation. States her rheumatologist has her on NSAIDs although she has not followed up since 2019.   Is active smoker - one pack per day   Significant dental cavities.   Does have h/o synovitis or swelling in her knees.   Also has h/o chest pain.       Review of Systems     Review of Systems   Constitutional: Positive for unexpected weight change. Negative for fever.   HENT: Negative for mouth sores and trouble swallowing.    Eyes: Negative for redness.   Respiratory: Positive for shortness of breath. Negative for cough.    Cardiovascular: Positive for chest pain.   Gastrointestinal: Negative for constipation and diarrhea.   Skin: Negative for rash.   Neurological: Positive for headaches.   Hematological: Bruises/bleeds easily.       Medical / Social / Family History     Past Medical History:   Diagnosis Date    Allergy     Anxiety     Carpal tunnel syndrome     Cervical radiculopathy     Congenital hypothyroidism     Depressive disorder     GERD (gastroesophageal reflux disease)     Hyperlipidemia     Hypertension     Insomnia     Irritable bowel syndrome     Lumbosacral spondylosis     Osteoarthritis of knee     Primary fibromyalgia syndrome     Radiculopathy, cervical region     Seizures     Spinal stenosis in cervical region     Spondylosis     Spondylosis without myelopathy or radiculopathy, cervical region     Type 2 diabetes mellitus        Past Surgical History:   Procedure Laterality Date    ADENOIDECTOMY      EPIDURAL STEROID INJECTION  09/02/2020    C5-6 FABIANO - Dr Venegas    ESOPHAGOGASTRODUODENOSCOPY      HYSTERECTOMY      INJECTION OF FACET JOINT Left 12/27/2018    Left C6-7 Facet Injection - Deb    INJECTION OF FACET JOINT Right 05/19/2019    Right C4-7 Facet Injection X  2 -Deb    INJECTION OF FACET JOINT Left 02/27/2019    Left C4-7 Facet Injection -Deb    knee bilateral      RADIOFREQUENCY THERMOCOAGULATION Right 05/21/2019    Right C4-7 RFTC - Deb    RADIOFREQUENCY THERMOCOAGULATION Left 04/23/2019    Left C4-7 RFTC -Deb    shoulder bilateral      TONSILLECTOMY         Social History  Ms. Cherry Smiley  reports that she has been smoking. She has never used smokeless tobacco. She reports previous alcohol use. She reports that she does not use drugs.    Family History  Ms.'liliana Smiley family history includes Cancer in her brother; Diabetes in her brother and mother; Heart disease in her father and mother; Hypertension in her brother, father, and mother; Lupus in her father; Stroke in her father and mother.    Medications and Allergies     Medications  No outpatient medications have been marked as taking for the 5/9/22 encounter (Office Visit) with Trinity Dewey MD.       Allergies  Review of patient's allergies indicates:   Allergen Reactions    Cinnamon analogues Anaphylaxis    Iodine Anaphylaxis    Morpholine analogues Shortness Of Breath    Shellfish containing products Anaphylaxis    Pamelor [nortriptyline]        Physical Examination     Vitals:    05/09/22 1557   BP: (!) 178/110   Pulse: 108     Physical Exam  Constitutional:       Appearance: She is obese.   HENT:      Head: Normocephalic and atraumatic.      Right Ear: External ear normal.      Left Ear: External ear normal.      Nose: Nose normal. No congestion or rhinorrhea.   Eyes:      Extraocular Movements: Extraocular movements intact.      Pupils: Pupils are equal, round, and reactive to light.   Cardiovascular:      Pulses: Normal pulses.   Pulmonary:      Effort: Pulmonary effort is normal.      Breath sounds: Normal breath sounds. No wheezing.   Chest:      Chest wall: No tenderness.   Musculoskeletal:         General: Swelling and tenderness present. Normal range of motion.      Cervical  back: No rigidity or tenderness.      Comments: Multiple tender and swollen joints MCP ++   Spinal stiffness +    Lymphadenopathy:      Cervical: No cervical adenopathy.   Skin:     Findings: No rash.   Neurological:      General: No focal deficit present.      Mental Status: She is alert and oriented to person, place, and time.   Psychiatric:         Mood and Affect: Mood normal.         Thought Content: Thought content normal.                Signature:  rTinity Dewey MD  [facility] - RHEUMATOLOGY  [address] [place] [id number]  [phone]    Date of encounter: 5/9/22

## 2022-05-09 NOTE — Clinical Note
The right radial was prepped. The site was prepped with ChloraPrep and chlorhexidine. The site was clipped. The patient was draped. The patient was positioned supine. The patient was secured using safety straps, with ulnar pads and to an armboard.

## 2022-05-09 NOTE — ED PROVIDER NOTES
Encounter Date: 5/9/2022       History     Chief Complaint   Patient presents with    Chest Pain    Hypertension     Patient presents to ER with chest pain. Patient states chest pain started 3 days ago.  Pain became worse just after lunch.  Patient describes pain as sharp and located in left chest and radiating into left arm and left ear.  She states when pain became severe she became nauseated and broke into a sweat.  She is diabetic.  She has history of HTN and seizures.  She is also a chronic pain patient and is followed by CHRISTY Little. She states her pain feels like an ache at present but severity comes and goes.  She denies shortness of breath.  No cough or congestion.  No fever.     The history is provided by the patient and the spouse. No  was used.     Review of patient's allergies indicates:   Allergen Reactions    Cinnamon analogues Anaphylaxis    Iodine Anaphylaxis    Morpholine analogues Shortness Of Breath    Shellfish containing products Anaphylaxis    Pamelor [nortriptyline]      Past Medical History:   Diagnosis Date    Allergy     Anxiety     Carpal tunnel syndrome     Cervical radiculopathy     Congenital hypothyroidism     Depressive disorder     GERD (gastroesophageal reflux disease)     Hyperlipidemia     Hypertension     Insomnia     Irritable bowel syndrome     Lumbosacral spondylosis     Osteoarthritis of knee     Primary fibromyalgia syndrome     Radiculopathy, cervical region     Seizures     Spinal stenosis in cervical region     Spondylosis     Spondylosis without myelopathy or radiculopathy, cervical region     Type 2 diabetes mellitus      Past Surgical History:   Procedure Laterality Date    ADENOIDECTOMY      EPIDURAL STEROID INJECTION  09/02/2020    C5-6 [facility] - Dr Venegas    ESOPHAGOGASTRODUODENOSCOPY      HYSTERECTOMY      INJECTION OF FACET JOINT Left 12/27/2018    Left C6-7 Facet Injection - Deb    INJECTION OF FACET JOINT Right  05/19/2019    Right C4-7 Facet Injection X 2 -Deb    INJECTION OF FACET JOINT Left 02/27/2019    Left C4-7 Facet Injection -Deb    knee bilateral      RADIOFREQUENCY THERMOCOAGULATION Right 05/21/2019    Right C4-7 RFTC - Deb    RADIOFREQUENCY THERMOCOAGULATION Left 04/23/2019    Left C4-7 RFTC -Deb    shoulder bilateral      TONSILLECTOMY       Family History   Problem Relation Age of Onset    Stroke Mother     Diabetes Mother     Heart disease Mother     Hypertension Mother     Hypertension Father     Heart disease Father     Stroke Father     Lupus Father     Cancer Brother     Diabetes Brother     Hypertension Brother      Social History     Tobacco Use    Smoking status: Current Every Day Smoker    Smokeless tobacco: Never Used   Substance Use Topics    Alcohol use: Not Currently    Drug use: Never     Review of Systems   Constitutional: Positive for appetite change, diaphoresis and fatigue.   Cardiovascular: Positive for chest pain.   Musculoskeletal: Positive for myalgias.   Psychiatric/Behavioral: The patient is nervous/anxious.    All other systems reviewed and are negative.      Physical Exam     Initial Vitals [05/09/22 1622]   BP Pulse Resp Temp SpO2   (!) 166/101 95 (!) 22 98.6 °F (37 °C) 98 %      MAP       --         Physical Exam    Nursing note and vitals reviewed.  Constitutional: She appears well-developed and well-nourished.   HENT:   Head: Normocephalic.   Right Ear: External ear normal.   Left Ear: External ear normal.   Nose: Nose normal.   Mouth/Throat: Oropharynx is clear and moist.   Eyes: Conjunctivae and EOM are normal. Pupils are equal, round, and reactive to light.   Neck: Neck supple.   Normal range of motion.  Cardiovascular: Normal rate, regular rhythm, normal heart sounds and intact distal pulses.   Pulmonary/Chest: Breath sounds normal.   Abdominal: Abdomen is soft. Bowel sounds are normal.   Musculoskeletal:         General: Normal range of motion.       Cervical back: Normal range of motion and neck supple.     Neurological: She is alert and oriented to person, place, and time. She has normal strength. GCS score is 15. GCS eye subscore is 4. GCS verbal subscore is 5. GCS motor subscore is 6.   Skin: Skin is warm and dry. Capillary refill takes less than 2 seconds.   Psychiatric: She has a normal mood and affect. Her behavior is normal. Judgment and thought content normal.         Medical Screening Exam   See Full Note    ED Course   Procedures  Labs Reviewed   COMPREHENSIVE METABOLIC PANEL - Abnormal; Notable for the following components:       Result Value    Sodium 133 (*)     Chloride 97 (*)     Glucose 360 (*)     AST 10 (*)     All other components within normal limits   TROPONIN I - Abnormal; Notable for the following components:    Troponin I High Sensitivity 485.5 (*)     All other components within normal limits   CBC WITH DIFFERENTIAL - Abnormal; Notable for the following components:    WBC 12.17 (*)     MCH 31.7 (*)     All other components within normal limits   URINALYSIS, REFLEX TO URINE CULTURE - Abnormal; Notable for the following components:    Glucose, UA >=1000 (*)     All other components within normal limits   NT-PRO NATRIURETIC PEPTIDE - Abnormal; Notable for the following components:    ProBNP 917 (*)     All other components within normal limits   MAGNESIUM - Normal   APTT - Normal   PROTIME-INR - Normal   CBC W/ AUTO DIFFERENTIAL    Narrative:     The following orders were created for panel order CBC auto differential.  Procedure                               Abnormality         Status                     ---------                               -----------         ------                     CBC with Differential[[id number]]        Abnormal            Final result                 Please view results for these tests on the individual orders.   SARS-COV2 (COVID) W/ FLU ANTIGEN          Imaging Results          X-Ray Chest PA And Lateral (Final  result)  Result time 05/09/22 17:20:24    Final result by Davian Diego DO (05/09/22 17:20:24)                 Impression:      As above.      Electronically signed by: Davian Diego  Date:    05/09/2022  Time:    17:20             Narrative:    EXAMINATION:  XR CHEST PA AND LATERAL    CLINICAL HISTORY:  Chest Pain;    TECHNIQUE:  XR CHEST PA AND LATERAL    COMPARISON:  Comparisons were reviewed, if available.    FINDINGS:  No lines or tubes.    Lungs are clear.    Normal pleura.    Cardiac silhouette is similar to comparison exam.    No new acute bone findings.                                 Medications   acetaminophen tablet 1,000 mg (has no administration in time range)   bisacodyL EC tablet 10 mg (has no administration in time range)   dextromethorphan-guaiFENesin  mg/5 ml liquid 10 mL (has no administration in time range)   ondansetron injection 8 mg (has no administration in time range)   simethicone chewable tablet 80 mg (has no administration in time range)   traZODone tablet 50 mg (has no administration in time range)   hydrALAZINE injection 10 mg (10 mg Intravenous Given 5/9/22 1727)   sodium chloride 0.9% bolus 1,000 mL (1,000 mLs Intravenous New Bag 5/9/22 1728)   nitroGLYCERIN 2% TD oint ointment 0.5 inch (0.5 inches Topical (Top) Given 5/9/22 1810)   ondansetron injection 4 mg (4 mg Intravenous Given 5/9/22 1809)   metoprolol injection 5 mg (5 mg Intravenous Given 5/9/22 1809)   aspirin tablet 325 mg (325 mg Oral Given 5/9/22 1806)   heparin (porcine) injection 4,000 Units (4,000 Units Intravenous Given 5/9/22 1807)     Medical Decision Making:   ED Management:  1745 Discussed results with Dr Ny.  Will repeat troponin and then contact Cardiology.   1755 Patient pain increased cardiology consulted.  Dr Byrne to review EKG.  1804 Dr Byrne request activation of cath lab for NSTEMI.  1815 Dr Byrne in ER to evaluate patient. Requests patient be admitted to Medicine, and  consult put in for cardiology. Patient will go to cath lab now.              ED Course as of 05/09/22 1831   Mon May 09, 2022   1741 EKG 12-lead [CQ]      ED Course User Index  [CQ] SANDI Anthony          Clinical Impression:   Final diagnoses:  [I21.4] NSTEMI (non-ST elevated myocardial infarction) (Primary)  [E11.69] Type 2 diabetes mellitus with other specified complication, unspecified whether long term insulin use          ED Disposition Condition    Admit               SANDI Anthony  05/09/22 1832

## 2022-05-09 NOTE — ASSESSMENT & PLAN NOTE
Typical CP, initial troponin 400s. EKG with lateral ST-T changes  RF - DM, RA, HTN, +Ve Fhx  Plan for urgent LHC   IV hydrcortisone 125mg and IV benadryl 50mg x1 (contrast allergy)  S/P Aspirin, statin, iv heparin

## 2022-05-09 NOTE — ED TRIAGE NOTES
Presents to ED from rheumatology clinic for c/o chest pain and HTN that has been ongoing for a few days. Hx of RA and fibromyalgia.

## 2022-05-09 NOTE — Clinical Note
The left ventricle was visualized and hand injected. The injected rate was 12 mL/sec. The total injected volume was 24 mL.

## 2022-05-09 NOTE — Clinical Note
The catheter insertion attempt was made into the aorta. The catheter was unable to access the area..

## 2022-05-09 NOTE — Clinical Note
140 ml of contrast were injected throughout the case. 160 mL of contrast was the total wasted during the case. 300 mL was the total amount used during the case.

## 2022-05-09 NOTE — Clinical Note
The catheter was removed from the ostium   left main. Hemodynamics were performed.  An angiography was performed of the left coronary arteries. Multiple views were taken.

## 2022-05-09 NOTE — Clinical Note
The right groin was prepped. The site was prepped with ChloraPrep and chlorhexidine. The site was clipped. The patient was draped. The patient was positioned supine. The patient was secured using safety straps and with ulnar pads.

## 2022-05-09 NOTE — Clinical Note
A handoff report was given to KLAUS Hernandez RN 6N. Right groin soft and without hematoma, 2+DP on right, Right wrist TR Band intact and without hematoma, pulse distal to band.

## 2022-05-09 NOTE — SUBJECTIVE & OBJECTIVE
Past Medical History:   Diagnosis Date    Allergy     Anxiety     Carpal tunnel syndrome     Cervical radiculopathy     Congenital hypothyroidism     Depressive disorder     GERD (gastroesophageal reflux disease)     Hyperlipidemia     Hypertension     Insomnia     Irritable bowel syndrome     Lumbosacral spondylosis     Osteoarthritis of knee     Primary fibromyalgia syndrome     Radiculopathy, cervical region     Seizures     Spinal stenosis in cervical region     Spondylosis     Spondylosis without myelopathy or radiculopathy, cervical region     Type 2 diabetes mellitus        Past Surgical History:   Procedure Laterality Date    ADENOIDECTOMY      EPIDURAL STEROID INJECTION  09/02/2020    C5-6 [facility] - Dr Venegas    ESOPHAGOGASTRODUODENOSCOPY      HYSTERECTOMY      INJECTION OF FACET JOINT Left 12/27/2018    Left C6-7 Facet Injection - Deb    INJECTION OF FACET JOINT Right 05/19/2019    Right C4-7 Facet Injection X 2 -Deb    INJECTION OF FACET JOINT Left 02/27/2019    Left C4-7 Facet Injection -Deb    knee bilateral      RADIOFREQUENCY THERMOCOAGULATION Right 05/21/2019    Right C4-7 RFTC - Deb    RADIOFREQUENCY THERMOCOAGULATION Left 04/23/2019    Left C4-7 RFTC -Deb    shoulder bilateral      TONSILLECTOMY         Review of patient's allergies indicates:   Allergen Reactions    Cinnamon analogues Anaphylaxis    Iodine Anaphylaxis    Morpholine analogues Shortness Of Breath    Shellfish containing products Anaphylaxis    Pamelor [nortriptyline]        No current facility-administered medications on file prior to encounter.     Current Outpatient Medications on File Prior to Encounter   Medication Sig    dapagliflozin-metformin (XIGDUO XR) 5-500 mg TBph Take by mouth.    folic acid (FOLVITE) 1 MG tablet Take 1 tablet (1 mg total) by mouth once daily.    gabapentin (NEURONTIN) 300 MG capsule Take 1 capsule (300 mg total) by mouth every 8 (eight) hours.    insulin detemir U-100 (LEVEMIR FLEXTOUCH) 100 unit/mL (3 mL)  SubQ InPn pen Inject into the skin every evening.    insulin NPH-insulin regular, 70/30, (NOVOLIN 70/30) 100 unit/mL (70-30) injection Inject into the skin.    levETIRAcetam (KEPPRA) 750 MG Tab Take 500 mg by mouth 2 (two) times daily.    methotrexate 2.5 MG Tab Take 6 tablets (15 mg total) by mouth once a week.    oxyCODONE-acetaminophen (PERCOCET) 7.5-325 mg per tablet Take 1 tablet by mouth every 8 (eight) hours.    [START ON 5/15/2022] oxyCODONE-acetaminophen (PERCOCET) 7.5-325 mg per tablet Take 1 tablet by mouth every 8 (eight) hours.     Family History       Problem Relation (Age of Onset)    Cancer Brother    Diabetes Mother, Brother    Heart disease Mother, Father    Hypertension Mother, Father, Brother    Lupus Father    Stroke Mother, Father          Tobacco Use    Smoking status: Current Every Day Smoker    Smokeless tobacco: Never Used   Substance and Sexual Activity    Alcohol use: Not Currently    Drug use: Never    Sexual activity: Not on file     Review of Systems   Constitutional: Positive for diaphoresis.   Cardiovascular:  Positive for chest pain.   Gastrointestinal:  Positive for nausea and vomiting.   All other systems reviewed and are negative.  Objective:     Vital Signs (Most Recent):  Temp: 98.6 °F (37 °C) (05/09/22 1622)  Pulse: 95 (05/09/22 1638)  Resp: 14 (05/09/22 1638)  BP: (!) 173/104 (05/09/22 1809)  SpO2: 98 % (05/09/22 1638)   Vital Signs (24h Range):  Temp:  [98.6 °F (37 °C)] 98.6 °F (37 °C)  Pulse:  [] 95  Resp:  [14-22] 14  SpO2:  [98 %-99 %] 98 %  BP: (166-178)/(101-110) 173/104     Weight: 81.6 kg (180 lb)  Body mass index is 32.92 kg/m².    SpO2: 98 %  O2 Device (Oxygen Therapy): room air    No intake or output data in the 24 hours ending 05/09/22 1844    Lines/Drains/Airways       Peripheral Intravenous Line  Duration                  Peripheral IV - Single Lumen 05/09/22 1652 20 G Right Antecubital <1 day         Peripheral IV - Single Lumen 05/09/22 1828 20 G Left  Antecubital <1 day                    Physical Exam  Constitutional:       Appearance: She is diaphoretic.   HENT:      Head: Normocephalic and atraumatic.      Right Ear: External ear normal.      Left Ear: External ear normal.      Nose: Nose normal.      Mouth/Throat:      Mouth: Mucous membranes are moist.   Eyes:      Extraocular Movements: Extraocular movements intact.      Pupils: Pupils are equal, round, and reactive to light.   Cardiovascular:      Rate and Rhythm: Normal rate and regular rhythm.      Pulses: Normal pulses.      Heart sounds: Normal heart sounds.   Pulmonary:      Effort: Pulmonary effort is normal.      Breath sounds: Normal breath sounds.   Abdominal:      General: Abdomen is flat.      Palpations: Abdomen is soft.   Musculoskeletal:         General: Normal range of motion.      Cervical back: Normal range of motion and neck supple.   Skin:     General: Skin is warm.      Capillary Refill: Capillary refill takes less than 2 seconds.   Neurological:      General: No focal deficit present.      Mental Status: She is alert and oriented to person, place, and time.   Psychiatric:         Mood and Affect: Mood normal.       Significant Labs: BMP:   Recent Labs   Lab 22  1702   *   *   K 3.8   CL 97*   CO2 25   BUN 10   CREATININE 0.89   CALCIUM 9.1   MG 1.7   , CMP   Recent Labs   Lab 22  1702   *   K 3.8   CL 97*   CO2 25   *   BUN 10   CREATININE 0.89   CALCIUM 9.1   PROT 7.0   ALBUMIN 3.5   BILITOT 0.4   ALKPHOS 95   AST 10*   ALT 22   ANIONGAP 15   EGFRNONAA 74   , CBC   Recent Labs   Lab 22  1702   WBC 12.17*   HGB 15.1   HCT 43.8      , Lipid Panel No results for input(s): CHOL, HDL, LDLCALC, TRIG, CHOLHDL in the last 48 hours., and Troponin No results for input(s): TROPONINI in the last 48 hours.    Significant Imaging: EK22 - NSR, PRWP, Lateral ST-T abnl

## 2022-05-09 NOTE — HPI
42 yo female with IDDM, RA, HTN, who was sent from rheumatology clinic to the ED for chest pain.     Patient report 5 days of waxing and waning SSCP htat radiates down her left arm and up her neck.   She report nausea and vomiting, diaphoresis.   10/10 that improved wto 5/10 after aspirin, nitro, IV heparin bolus.     EKG; SR with lateral ST-T depressions.   First troponin 400s

## 2022-05-09 NOTE — CONSULTS
[facility], [place] - Emergency Department  Cardiology  Consult Note    Patient Name: Cherry Smiley  MRN: [id number]  Admission Date: 5/9/2022  Hospital Length of Stay: 0 days  Code Status: Full Code   Attending Provider: Jeff Yanez MD   Consulting Provider: Arthur Dewey MD  Primary Care Physician: King Griffin MD  Principal Problem:<principal problem not specified>    Patient information was obtained from patient and ER records.     Inpatient consult to Cardiology  Consult performed by: Arthur Dewey MD  Consult ordered by: SANDI Anthony        Subjective:     Chief Complaint:  Chest pain     HPI:   40 yo female with IDDM, RA, HTN, who was sent from rheumatology clinic to the ED for chest pain.     Patient report 5 days of waxing and waning SSCP htat radiates down her left arm and up her neck.   She report nausea and vomiting, diaphoresis.   10/10 that improved wto 5/10 after aspirin, nitro, IV heparin bolus.     EKG; SR with lateral ST-T depressions.   First troponin 400s      Past Medical History:   Diagnosis Date    Allergy     Anxiety     Carpal tunnel syndrome     Cervical radiculopathy     Congenital hypothyroidism     Depressive disorder     GERD (gastroesophageal reflux disease)     Hyperlipidemia     Hypertension     Insomnia     Irritable bowel syndrome     Lumbosacral spondylosis     Osteoarthritis of knee     Primary fibromyalgia syndrome     Radiculopathy, cervical region     Seizures     Spinal stenosis in cervical region     Spondylosis     Spondylosis without myelopathy or radiculopathy, cervical region     Type 2 diabetes mellitus        Past Surgical History:   Procedure Laterality Date    ADENOIDECTOMY      EPIDURAL STEROID INJECTION  09/02/2020    C5-6 [facility] - Dr Venegas    ESOPHAGOGASTRODUODENOSCOPY      HYSTERECTOMY      INJECTION OF FACET JOINT Left 12/27/2018    Left C6-7 Facet Injection - Deb    INJECTION OF FACET JOINT Right 05/19/2019    Right  C4-7 Facet Injection X 2 -Deb    INJECTION OF FACET JOINT Left 02/27/2019    Left C4-7 Facet Injection -Deb    knee bilateral      RADIOFREQUENCY THERMOCOAGULATION Right 05/21/2019    Right C4-7 RFTC - Deb    RADIOFREQUENCY THERMOCOAGULATION Left 04/23/2019    Left C4-7 RFTC -Deb    shoulder bilateral      TONSILLECTOMY         Review of patient's allergies indicates:   Allergen Reactions    Cinnamon analogues Anaphylaxis    Iodine Anaphylaxis    Morpholine analogues Shortness Of Breath    Shellfish containing products Anaphylaxis    Pamelor [nortriptyline]        No current facility-administered medications on file prior to encounter.     Current Outpatient Medications on File Prior to Encounter   Medication Sig    dapagliflozin-metformin (XIGDUO XR) 5-500 mg TBph Take by mouth.    folic acid (FOLVITE) 1 MG tablet Take 1 tablet (1 mg total) by mouth once daily.    gabapentin (NEURONTIN) 300 MG capsule Take 1 capsule (300 mg total) by mouth every 8 (eight) hours.    insulin detemir U-100 (LEVEMIR FLEXTOUCH) 100 unit/mL (3 mL) SubQ InPn pen Inject into the skin every evening.    insulin NPH-insulin regular, 70/30, (NOVOLIN 70/30) 100 unit/mL (70-30) injection Inject into the skin.    levETIRAcetam (KEPPRA) 750 MG Tab Take 500 mg by mouth 2 (two) times daily.    methotrexate 2.5 MG Tab Take 6 tablets (15 mg total) by mouth once a week.    oxyCODONE-acetaminophen (PERCOCET) 7.5-325 mg per tablet Take 1 tablet by mouth every 8 (eight) hours.    [START ON 5/15/2022] oxyCODONE-acetaminophen (PERCOCET) 7.5-325 mg per tablet Take 1 tablet by mouth every 8 (eight) hours.     Family History       Problem Relation (Age of Onset)    Cancer Brother    Diabetes Mother, Brother    Heart disease Mother, Father    Hypertension Mother, Father, Brother    Lupus Father    Stroke Mother, Father          Tobacco Use    Smoking status: Current Every Day Smoker    Smokeless tobacco: Never Used   Substance and Sexual  Activity    Alcohol use: Not Currently    Drug use: Never    Sexual activity: Not on file     Review of Systems   Constitutional: Positive for diaphoresis.   Cardiovascular:  Positive for chest pain.   Gastrointestinal:  Positive for nausea and vomiting.   All other systems reviewed and are negative.  Objective:     Vital Signs (Most Recent):  Temp: 98.6 °F (37 °C) (05/09/22 1622)  Pulse: 95 (05/09/22 1638)  Resp: 14 (05/09/22 1638)  BP: (!) 173/104 (05/09/22 1809)  SpO2: 98 % (05/09/22 1638)   Vital Signs (24h Range):  Temp:  [98.6 °F (37 °C)] 98.6 °F (37 °C)  Pulse:  [] 95  Resp:  [14-22] 14  SpO2:  [98 %-99 %] 98 %  BP: (166-178)/(101-110) 173/104     Weight: 81.6 kg (180 lb)  Body mass index is 32.92 kg/m².    SpO2: 98 %  O2 Device (Oxygen Therapy): room air    No intake or output data in the 24 hours ending 05/09/22 1844    Lines/Drains/Airways       Peripheral Intravenous Line  Duration                  Peripheral IV - Single Lumen 05/09/22 1652 20 G Right Antecubital <1 day         Peripheral IV - Single Lumen 05/09/22 1828 20 G Left Antecubital <1 day                    Physical Exam  Constitutional:       Appearance: She is diaphoretic.   HENT:      Head: Normocephalic and atraumatic.      Right Ear: External ear normal.      Left Ear: External ear normal.      Nose: Nose normal.      Mouth/Throat:      Mouth: Mucous membranes are moist.   Eyes:      Extraocular Movements: Extraocular movements intact.      Pupils: Pupils are equal, round, and reactive to light.   Cardiovascular:      Rate and Rhythm: Normal rate and regular rhythm.      Pulses: Normal pulses.      Heart sounds: Normal heart sounds.   Pulmonary:      Effort: Pulmonary effort is normal.      Breath sounds: Normal breath sounds.   Abdominal:      General: Abdomen is flat.      Palpations: Abdomen is soft.   Musculoskeletal:         General: Normal range of motion.      Cervical back: Normal range of motion and neck supple.   Skin:      General: Skin is warm.      Capillary Refill: Capillary refill takes less than 2 seconds.   Neurological:      General: No focal deficit present.      Mental Status: She is alert and oriented to person, place, and time.   Psychiatric:         Mood and Affect: Mood normal.       Significant Labs: BMP:   Recent Labs   Lab 22  1702   *   *   K 3.8   CL 97*   CO2 25   BUN 10   CREATININE 0.89   CALCIUM 9.1   MG 1.7   , CMP   Recent Labs   Lab 22  1702   *   K 3.8   CL 97*   CO2 25   *   BUN 10   CREATININE 0.89   CALCIUM 9.1   PROT 7.0   ALBUMIN 3.5   BILITOT 0.4   ALKPHOS 95   AST 10*   ALT 22   ANIONGAP 15   EGFRNONAA 74   , CBC   Recent Labs   Lab 22  1702   WBC 12.17*   HGB 15.1   HCT 43.8      , Lipid Panel No results for input(s): CHOL, HDL, LDLCALC, TRIG, CHOLHDL in the last 48 hours., and Troponin No results for input(s): TROPONINI in the last 48 hours.    Significant Imaging: EK22 - NSR, PRWP, Lateral ST-T abnl    Assessment and Plan:     NSTEMI (non-ST elevated myocardial infarction)  Typical CP, initial troponin 400s. EKG with lateral ST-T changes  RF - DM, RA, HTN, +Ve Fhx  Plan for urgent C   IV hydrcortisone 125mg and IV benadryl 50mg x1 (contrast allergy)  S/P Aspirin, statin, iv heparin        VTE Risk Mitigation (From admission, onward)    None          Thank you for your consult. I will follow-up with patient. Please contact us if you have any additional questions.    Arthur Dewey MD  Cardiology   [facility] - Emergency Department

## 2022-05-10 PROBLEM — G47.33 OSA (OBSTRUCTIVE SLEEP APNEA): Status: ACTIVE | Noted: 2022-05-10

## 2022-05-10 LAB
ANION GAP SERPL CALCULATED.3IONS-SCNC: 19 MMOL/L (ref 7–16)
AORTIC ROOT ANNULUS: 1.9 CM
AORTIC VALVE CUSP SEPERATION: 18.8 CM
AV INDEX (PROSTH): 0.82
AV MEAN GRADIENT: 4 MMHG
AV PEAK GRADIENT: 6 MMHG
AV VALVE AREA: 1.45 CM2
AV VELOCITY RATIO: 0.67
BASOPHILS # BLD AUTO: 0.02 K/UL (ref 0–0.2)
BASOPHILS NFR BLD AUTO: 0.2 % (ref 0–1)
BASOPHILS NFR BLD MANUAL: 1 % (ref 0–1)
BSA FOR ECHO PROCEDURE: 1.89 M2
BUN SERPL-MCNC: 15 MG/DL (ref 7–18)
BUN/CREAT SERPL: 16 (ref 6–20)
CALCIUM SERPL-MCNC: 8.9 MG/DL (ref 8.5–10.1)
CHLORIDE SERPL-SCNC: 97 MMOL/L (ref 98–107)
CO2 SERPL-SCNC: 22 MMOL/L (ref 21–32)
CREAT SERPL-MCNC: 0.95 MG/DL (ref 0.55–1.02)
CV ECHO LV RWT: 0.45 CM
DIFFERENTIAL METHOD BLD: ABNORMAL
DOP CALC AO PEAK VEL: 1.2 M/S
DOP CALC AO VTI: 17 CM
DOP CALC LVOT AREA: 1.8 CM2
DOP CALC LVOT DIAMETER: 1.5 CM
DOP CALC LVOT PEAK VEL: 0.8 M/S
DOP CALC LVOT STROKE VOLUME: 24.73 CM3
DOP CALCLVOT PEAK VEL VTI: 14 CM
E WAVE DECELERATION TIME: 200 MSEC
ECHO EF ESTIMATED: 60 %
ECHO LV POSTERIOR WALL: 1.04 CM (ref 0.6–1.1)
EJECTION FRACTION: 60 %
EOSINOPHIL # BLD AUTO: 0.01 K/UL (ref 0–0.5)
EOSINOPHIL NFR BLD AUTO: 0.1 % (ref 1–4)
ERYTHROCYTE [DISTWIDTH] IN BLOOD BY AUTOMATED COUNT: 12.6 % (ref 11.5–14.5)
FRACTIONAL SHORTENING: 36 % (ref 28–44)
GLUCOSE SERPL-MCNC: 243 MG/DL (ref 70–105)
GLUCOSE SERPL-MCNC: 311 MG/DL (ref 70–105)
GLUCOSE SERPL-MCNC: 363 MG/DL (ref 70–105)
GLUCOSE SERPL-MCNC: 388 MG/DL (ref 74–106)
GLUCOSE SERPL-MCNC: 399 MG/DL (ref 70–105)
HCT VFR BLD AUTO: 42.5 % (ref 38–47)
HGB BLD-MCNC: 14.6 G/DL (ref 12–16)
IMM GRANULOCYTES # BLD AUTO: 0.08 K/UL (ref 0–0.04)
IMM GRANULOCYTES NFR BLD: 0.7 % (ref 0–0.4)
INTERVENTRICULAR SEPTUM: 0.97 CM (ref 0.6–1.1)
IVC OSTIUM: 1 CM
LEFT ATRIUM SIZE: 2.9 CM
LEFT INTERNAL DIMENSION IN SYSTOLE: 2.96 CM (ref 2.1–4)
LEFT VENTRICLE DIASTOLIC VOLUME INDEX: 53.99 ML/M2
LEFT VENTRICLE DIASTOLIC VOLUME: 98.8 ML
LEFT VENTRICLE MASS INDEX: 88 G/M2
LEFT VENTRICLE SYSTOLIC VOLUME INDEX: 18.5 ML/M2
LEFT VENTRICLE SYSTOLIC VOLUME: 33.9 ML
LEFT VENTRICULAR INTERNAL DIMENSION IN DIASTOLE: 4.63 CM (ref 3.5–6)
LEFT VENTRICULAR MASS: 161.59 G
LVOT MG: 1 MMHG
LYMPHOCYTES # BLD AUTO: 0.77 K/UL (ref 1–4.8)
LYMPHOCYTES NFR BLD AUTO: 6.6 % (ref 27–41)
LYMPHOCYTES NFR BLD MANUAL: 8 % (ref 27–41)
MCH RBC QN AUTO: 31.1 PG (ref 27–31)
MCHC RBC AUTO-ENTMCNC: 34.4 G/DL (ref 32–36)
MCV RBC AUTO: 90.6 FL (ref 80–96)
MONOCYTES # BLD AUTO: 0.11 K/UL (ref 0–0.8)
MONOCYTES NFR BLD AUTO: 0.9 % (ref 2–6)
MONOCYTES NFR BLD MANUAL: 2 % (ref 2–6)
MPC BLD CALC-MCNC: 10.4 FL (ref 9.4–12.4)
MV PEAK E VEL: 0.91 M/S
NEUTROPHILS # BLD AUTO: 10.73 K/UL (ref 1.8–7.7)
NEUTROPHILS NFR BLD AUTO: 91.5 % (ref 53–65)
NEUTS SEG NFR BLD MANUAL: 89 % (ref 50–62)
NRBC # BLD AUTO: 0 X10E3/UL
NRBC, AUTO (.00): 0 %
PISA TR MAX VEL: 2.9 M/S
PLATELET # BLD AUTO: 286 K/UL (ref 150–400)
PLATELET MORPHOLOGY: NORMAL
POC ACTIVATED CLOTTING TIME K: 236 SEC
POC ACTIVATED CLOTTING TIME K: 284 SEC
POTASSIUM SERPL-SCNC: 4.5 MMOL/L (ref 3.5–5.1)
RA MAJOR: 2.8 CM
RA PRESSURE: 3 MMHG
RBC # BLD AUTO: 4.69 M/UL (ref 4.2–5.4)
RBC MORPH BLD: NORMAL
RIGHT VENTRICULAR END-DIASTOLIC DIMENSION: 2.4 CM
SODIUM SERPL-SCNC: 133 MMOL/L (ref 136–145)
T4 SERPL-MCNC: 5.9 ΜG/DL (ref 4.8–13.9)
TR MAX PG: 34 MMHG
TRICUSPID ANNULAR PLANE SYSTOLIC EXCURSION: 2.2 CM
TROPONIN I SERPL HS-MCNC: ABNORMAL PG/ML
TSH SERPL DL<=0.005 MIU/L-ACNC: 0.37 UIU/ML (ref 0.36–3.74)
TV REST PULMONARY ARTERY PRESSURE: 37 MMHG
WBC # BLD AUTO: 11.72 K/UL (ref 4.5–11)

## 2022-05-10 PROCEDURE — 84443 ASSAY THYROID STIM HORMONE: CPT | Performed by: HOSPITALIST

## 2022-05-10 PROCEDURE — 99232 PR SUBSEQUENT HOSPITAL CARE,LEVL II: ICD-10-PCS | Mod: ,,, | Performed by: STUDENT IN AN ORGANIZED HEALTH CARE EDUCATION/TRAINING PROGRAM

## 2022-05-10 PROCEDURE — 99232 SBSQ HOSP IP/OBS MODERATE 35: CPT | Mod: ,,, | Performed by: STUDENT IN AN ORGANIZED HEALTH CARE EDUCATION/TRAINING PROGRAM

## 2022-05-10 PROCEDURE — 25000003 PHARM REV CODE 250: Performed by: HOSPITALIST

## 2022-05-10 PROCEDURE — 80048 BASIC METABOLIC PNL TOTAL CA: CPT | Performed by: STUDENT IN AN ORGANIZED HEALTH CARE EDUCATION/TRAINING PROGRAM

## 2022-05-10 PROCEDURE — 63600175 PHARM REV CODE 636 W HCPCS: Performed by: HOSPITALIST

## 2022-05-10 PROCEDURE — 99233 PR SUBSEQUENT HOSPITAL CARE,LEVL III: ICD-10-PCS | Mod: ,,, | Performed by: HOSPITALIST

## 2022-05-10 PROCEDURE — C9399 UNCLASSIFIED DRUGS OR BIOLOG: HCPCS | Performed by: HOSPITALIST

## 2022-05-10 PROCEDURE — 36415 COLL VENOUS BLD VENIPUNCTURE: CPT | Performed by: STUDENT IN AN ORGANIZED HEALTH CARE EDUCATION/TRAINING PROGRAM

## 2022-05-10 PROCEDURE — 25000003 PHARM REV CODE 250: Performed by: STUDENT IN AN ORGANIZED HEALTH CARE EDUCATION/TRAINING PROGRAM

## 2022-05-10 PROCEDURE — 84436 ASSAY OF TOTAL THYROXINE: CPT | Performed by: HOSPITALIST

## 2022-05-10 PROCEDURE — 99233 SBSQ HOSP IP/OBS HIGH 50: CPT | Mod: ,,, | Performed by: HOSPITALIST

## 2022-05-10 PROCEDURE — 84484 ASSAY OF TROPONIN QUANT: CPT | Performed by: HOSPITALIST

## 2022-05-10 PROCEDURE — 82962 GLUCOSE BLOOD TEST: CPT

## 2022-05-10 PROCEDURE — 85025 COMPLETE CBC W/AUTO DIFF WBC: CPT | Performed by: STUDENT IN AN ORGANIZED HEALTH CARE EDUCATION/TRAINING PROGRAM

## 2022-05-10 PROCEDURE — 11000001 HC ACUTE MED/SURG PRIVATE ROOM

## 2022-05-10 RX ORDER — GABAPENTIN 300 MG/1
300 CAPSULE ORAL 3 TIMES DAILY
Status: DISCONTINUED | OUTPATIENT
Start: 2022-05-11 | End: 2022-05-11 | Stop reason: HOSPADM

## 2022-05-10 RX ORDER — LISINOPRIL 5 MG/1
5 TABLET ORAL DAILY
Status: DISCONTINUED | OUTPATIENT
Start: 2022-05-10 | End: 2022-05-10

## 2022-05-10 RX ORDER — ATORVASTATIN CALCIUM 80 MG/1
80 TABLET, FILM COATED ORAL NIGHTLY
Status: DISCONTINUED | OUTPATIENT
Start: 2022-05-11 | End: 2022-05-11 | Stop reason: HOSPADM

## 2022-05-10 RX ORDER — LEVETIRACETAM 750 MG/1
750 TABLET ORAL 2 TIMES DAILY
Status: DISCONTINUED | OUTPATIENT
Start: 2022-05-10 | End: 2022-05-11 | Stop reason: HOSPADM

## 2022-05-10 RX ORDER — FOLIC ACID 1 MG/1
1 TABLET ORAL DAILY
Status: DISCONTINUED | OUTPATIENT
Start: 2022-05-11 | End: 2022-05-11 | Stop reason: HOSPADM

## 2022-05-10 RX ORDER — LISINOPRIL 10 MG/1
10 TABLET ORAL DAILY
Status: DISCONTINUED | OUTPATIENT
Start: 2022-05-11 | End: 2022-05-11 | Stop reason: HOSPADM

## 2022-05-10 RX ADMIN — GABAPENTIN 300 MG: 300 CAPSULE ORAL at 09:05

## 2022-05-10 RX ADMIN — GABAPENTIN 300 MG: 300 CAPSULE ORAL at 06:05

## 2022-05-10 RX ADMIN — ATORVASTATIN CALCIUM 80 MG: 80 TABLET, FILM COATED ORAL at 09:05

## 2022-05-10 RX ADMIN — LEVETIRACETAM 750 MG: 750 TABLET, FILM COATED ORAL at 09:05

## 2022-05-10 RX ADMIN — INSULIN DETEMIR 25 UNITS: 100 INJECTION, SOLUTION SUBCUTANEOUS at 09:05

## 2022-05-10 RX ADMIN — ONDANSETRON 8 MG: 4 TABLET, ORALLY DISINTEGRATING ORAL at 03:05

## 2022-05-10 RX ADMIN — INSULIN DETEMIR 15 UNITS: 100 INJECTION, SOLUTION SUBCUTANEOUS at 11:05

## 2022-05-10 RX ADMIN — INSULIN ASPART 8 UNITS: 100 INJECTION, SOLUTION INTRAVENOUS; SUBCUTANEOUS at 05:05

## 2022-05-10 RX ADMIN — TICAGRELOR 90 MG: 90 TABLET ORAL at 09:05

## 2022-05-10 RX ADMIN — LISINOPRIL 5 MG: 5 TABLET ORAL at 11:05

## 2022-05-10 RX ADMIN — METOPROLOL SUCCINATE 25 MG: 25 TABLET, FILM COATED, EXTENDED RELEASE ORAL at 09:05

## 2022-05-10 RX ADMIN — GABAPENTIN 300 MG: 300 CAPSULE ORAL at 03:05

## 2022-05-10 RX ADMIN — INSULIN ASPART 10 UNITS: 100 INJECTION, SOLUTION INTRAVENOUS; SUBCUTANEOUS at 06:05

## 2022-05-10 RX ADMIN — LEVETIRACETAM 500 MG: 500 TABLET, FILM COATED ORAL at 09:05

## 2022-05-10 RX ADMIN — OXYCODONE AND ACETAMINOPHEN 2 TABLET: 5; 325 TABLET ORAL at 09:05

## 2022-05-10 RX ADMIN — ASPIRIN 81 MG: 81 TABLET, COATED ORAL at 09:05

## 2022-05-10 RX ADMIN — INSULIN ASPART 4 UNITS: 100 INJECTION, SOLUTION INTRAVENOUS; SUBCUTANEOUS at 09:05

## 2022-05-10 RX ADMIN — OXYCODONE AND ACETAMINOPHEN 2 TABLET: 5; 325 TABLET ORAL at 05:05

## 2022-05-10 NOTE — HPI
40 yo F with chronic pain syndrome and opioid dependence presents to the ED with several days of substernal CP that has now accompanied with N/V.  She states that she has cervical stenosis and felt the arm pain was related to her chronic pain.  She also has fibromyalgia.  She also smokes, has T2DM, HTN, and hyperlipidemia.      She was found to have an NSTEMI with EKG negative for acute ischemia but initial troponins elevated.  She was taken to the cath lab by Dr. Dewey and has two stents placed in her Lcx.  She has triple vessel disease but felt she was stable at present and will reinvesitage the other lesions at a later date.  She is currently chest pain free.      She sees CHRISTY Barnes from pain management, Dr. King Dodson from orthopedics and Dr. Luisa Dewey from rheumatology for her RA.  She has also been diagnosed with epilepsy and takes Keppra.

## 2022-05-10 NOTE — ASSESSMENT & PLAN NOTE
Basal bolus insulin while in hospital.    Hold metformin.    Will need close follow up with her PCP for risk factor modification.

## 2022-05-10 NOTE — ASSESSMENT & PLAN NOTE
Typical CP, initial troponin 400s. EKG with lateral ST-T changes  RF - DM, RA, HTN, +Ve Fhx  Plan for urgent LHC   IV hydrcortisone 125mg and IV benadryl 50mg x1 (contrast allergy)  S/P Aspirin, statin, iv heparin    5/10:  LHC:  · The 2nd Mrg lesion was 99% stenosed with 0% stenosis post-intervention.  · The Mid Cx-1 lesion was 99% stenosed with 0% stenosis post-intervention.  · The Mid Cx-2 lesion was 99% stenosed with 0% stenosis post-intervention.  · The left ventricular end diastolic pressure was elevated.  · The pre-procedure left ventricular end diastolic pressure was 15.  · A STENT IZABELA XIENCE SKYPOINT 2.25 X 12 stent was successfully placed at 9 DAPHNEY for 10 sec.  · A stent was successfully placed at 12 DAPHNEY for 4 sec.  · The Ost LAD to Mid LAD lesion was 70% stenosed.  · The Prox RCA to Dist RCA lesion was 70% stenosed.  · The estimated blood loss was none.  · There was three vessel coronary artery disease.     1. Three vessel coronary artery disease ( 70% pLAD - iFR 0.78,  99% thrombotic occlusion of OM2, and 70% RCA)  2. Slightly elevated left sided filling (LVEDP = 15mmHg)  3. S/P successful bifurcation PCI of LCx/OM2 with IZABELA (ZOILA Vazquez)     Plan:  1. DAPT for 1 years (brilinta/asa)  2. Medical management for residual disease; if persistent anginal symptoms will refer for CABG  3. Risk factor modification and life style changes. (smoking cessation)  4. Lipid panel, echo    Continue asa, brilinta, statin, BB, and lisinopril at discharge. Smoking cessation and cardiac rehab for NSTEMI. Okay to discharge from cardiology standpoint and follow up with Dr. Dewey in 2 weeks.

## 2022-05-10 NOTE — ASSESSMENT & PLAN NOTE
Patient is on asa and plavix and present.    Continue heparin infusion and possible DAPT  Statin therapy.    Will need aggressive risk factor modification to include smoking cessation.

## 2022-05-10 NOTE — PLAN OF CARE
[facility] - [facility] Telemetry  Initial Discharge Assessment       Primary Care Provider: King Griffin MD    Admission Diagnosis: NSTEMI (non-ST elevated myocardial infarction) [I21.4]  Type 2 diabetes mellitus with other specified complication, unspecified whether long term insulin use [E11.69]    Admission Date: 5/9/2022  Expected Discharge Date:     Discharge Barriers Identified: None    Payor: MEDICARE / Plan: MEDICARE PART A & B / Product Type: Government /     Extended Emergency Contact Information  Primary Emergency Contact: JUAREZ ROUSE  Address: [address] [place] [address], [place] [address] [place]  Mobile Phone: [phone]  Relation: Spouse  Preferred language: English   needed? No    Discharge Plan A: Home with family, Home Health  Discharge Plan B: Home with family, Home Health      [facility] [place] [address] [place] [address]  Phone: [phone] Fax: [phone]      Initial Assessment (most recent)     Adult Discharge Assessment - 05/10/22 1212        Discharge Assessment    Assessment Type Discharge Planning Assessment     Source of Information patient     Lives With spouse     Do you expect to return to your current living situation? Yes     Do you have help at home or someone to help you manage your care at home? Yes     Who are your caregiver(s) and their phone number(s)? juarez rouse spouse [phone]     Current cognitive status: Alert/Oriented     Equipment Currently Used at Home none     Do you currently have service(s) that help you manage your care at home? No     Discharge Plan A Home with family;Home Health     Discharge Plan B Home with family;Home Health     DME Needed Upon Discharge  none     Discharge Plan discussed with: Patient     Discharge Barriers Identified None               Consult for cardiac rehab, pt lives home with spouse, no hh or dme pta, choice for [facility], referral sent  to [place] with favian lainez, will also fax at [place]

## 2022-05-10 NOTE — SUBJECTIVE & OBJECTIVE
Past Medical History:   Diagnosis Date    Allergy     Anxiety     Carpal tunnel syndrome     Congenital hypothyroidism     Depressive disorder     GERD (gastroesophageal reflux disease)     Hyperlipidemia     Hypertension     Insomnia     Irritable bowel syndrome     Lumbosacral spondylosis     NSTEMI (non-ST elevated myocardial infarction) 05/09/2022    Osteoarthritis of knee     Primary fibromyalgia syndrome     Seizures     Spinal stenosis in cervical region     Type 2 diabetes mellitus        Past Surgical History:   Procedure Laterality Date    ADENOIDECTOMY      EPIDURAL STEROID INJECTION  09/02/2020    C5-6 FABIANO - Dr Venegas    ESOPHAGOGASTRODUODENOSCOPY      HYSTERECTOMY      INJECTION OF FACET JOINT Left 12/27/2018    Left C6-7 Facet Injection - Deb    INJECTION OF FACET JOINT Right 05/19/2019    Right C4-7 Facet Injection X 2 -Deb    INJECTION OF FACET JOINT Left 02/27/2019    Left C4-7 Facet Injection -Deb    knee bilateral      RADIOFREQUENCY THERMOCOAGULATION Right 05/21/2019    Right C4-7 RFTC - Deb    RADIOFREQUENCY THERMOCOAGULATION Left 04/23/2019    Left C4-7 RFTC -Deb    shoulder bilateral      TONSILLECTOMY         Review of patient's allergies indicates:   Allergen Reactions    Cinnamon analogues Anaphylaxis    Iodine Anaphylaxis    Morpholine analogues Shortness Of Breath    Shellfish containing products Anaphylaxis    Pamelor [nortriptyline]        No current facility-administered medications on file prior to encounter.     Current Outpatient Medications on File Prior to Encounter   Medication Sig    dapagliflozin-metformin (XIGDUO XR) 5-500 mg TBph Take by mouth.    folic acid (FOLVITE) 1 MG tablet Take 1 tablet (1 mg total) by mouth once daily.    gabapentin (NEURONTIN) 300 MG capsule Take 1 capsule (300 mg total) by mouth every 8 (eight) hours.    insulin detemir U-100 (LEVEMIR FLEXTOUCH) 100 unit/mL (3 mL) SubQ InPn pen Inject into the skin every evening.    insulin NPH-insulin regular,  70/30, (NOVOLIN 70/30) 100 unit/mL (70-30) injection Inject into the skin.    levETIRAcetam (KEPPRA) 750 MG Tab Take 500 mg by mouth 2 (two) times daily.    methotrexate 2.5 MG Tab Take 6 tablets (15 mg total) by mouth once a week.    oxyCODONE-acetaminophen (PERCOCET) 7.5-325 mg per tablet Take 1 tablet by mouth every 8 (eight) hours.    [START ON 5/15/2022] oxyCODONE-acetaminophen (PERCOCET) 7.5-325 mg per tablet Take 1 tablet by mouth every 8 (eight) hours.     Family History       Problem Relation (Age of Onset)    Cancer Brother    Diabetes Mother, Brother    Heart disease Mother, Father    Hypertension Mother, Father, Brother    Lupus Father    Stroke Mother, Father          Tobacco Use    Smoking status: Current Every Day Smoker    Smokeless tobacco: Never Used   Substance and Sexual Activity    Alcohol use: Not Currently    Drug use: Never    Sexual activity: Not on file     Review of Systems   Constitutional:  Negative for appetite change, chills, fatigue, fever and unexpected weight change.   HENT:  Negative for congestion, mouth sores, nosebleeds, sinus pain, sore throat and trouble swallowing.    Eyes:  Positive for visual disturbance.   Respiratory:  Negative for apnea, cough, chest tightness and shortness of breath.    Cardiovascular:  Positive for chest pain. Negative for palpitations and leg swelling.   Gastrointestinal:  Positive for nausea and vomiting. Negative for abdominal pain, blood in stool, constipation and diarrhea.   Endocrine: Negative for polydipsia and polyuria.   Genitourinary:  Negative for decreased urine volume, difficulty urinating, dysuria and frequency.   Musculoskeletal:  Positive for arthralgias, back pain, joint swelling, myalgias and neck pain.   Skin:  Negative for rash.   Neurological:  Negative for syncope, light-headedness and headaches.   Hematological:  Does not bruise/bleed easily.   Psychiatric/Behavioral:  Negative for confusion and suicidal ideas.    Objective:      Vital Signs (Most Recent):  Temp: 97 °F (36.1 °C) (05/10/22 0014)  Pulse: 94 (05/10/22 0014)  Resp: 18 (05/10/22 0014)  BP: (!) 142/79 (05/10/22 0014)  SpO2: 97 % (05/10/22 0014)   Vital Signs (24h Range):  Temp:  [97 °F (36.1 °C)-98.6 °F (37 °C)] 97 °F (36.1 °C)  Pulse:  [] 94  Resp:  [14-22] 18  SpO2:  [97 %-99 %] 97 %  BP: (141-178)/() 142/79     Weight: 81.6 kg (180 lb)  Body mass index is 32.92 kg/m².    Physical Exam  Constitutional:       Appearance: Normal appearance. She is obese.   HENT:      Head: Atraumatic.      Mouth/Throat:      Mouth: Mucous membranes are moist.      Pharynx: Oropharynx is clear.   Eyes:      Conjunctiva/sclera: Conjunctivae normal.      Pupils: Pupils are equal, round, and reactive to light.   Neck:      Vascular: No carotid bruit.   Cardiovascular:      Rate and Rhythm: Normal rate and regular rhythm.      Pulses: Normal pulses.      Heart sounds: Normal heart sounds.   Pulmonary:      Effort: Pulmonary effort is normal.      Breath sounds: Normal breath sounds.   Abdominal:      General: Abdomen is flat. Bowel sounds are normal.      Palpations: Abdomen is soft.   Musculoskeletal:         General: No deformity or signs of injury. Normal range of motion.      Cervical back: Neck supple.      Right lower leg: No edema.      Left lower leg: No edema.   Skin:     General: Skin is warm and dry.      Capillary Refill: Capillary refill takes 2 to 3 seconds.      Coloration: Skin is not jaundiced or pale.      Findings: No bruising, lesion or rash.   Neurological:      General: No focal deficit present.      Mental Status: She is alert and oriented to person, place, and time.   Psychiatric:         Mood and Affect: Mood normal.         CRANIAL NERVES     CN III, IV, VI   Pupils are equal, round, and reactive to light.     Significant Labs: All pertinent labs within the past 24 hours have been reviewed.    Significant Imaging: I have reviewed all pertinent imaging  results/findings within the past 24 hours.

## 2022-05-10 NOTE — ASSESSMENT & PLAN NOTE
- Spoke with Dr. Yanez, blood sugars 400. Plan to keep patient until blood sugar better controlled

## 2022-05-10 NOTE — NURSING
Rec'd pt from cath lab from R sided heart cath. Dressing to right groin; dry intact, no active signs of bleeding. TR band on right wrist; dry & intact. IV  20 G left AC & 20 G to right AC. Breathing non labored; depth reg, room air. Abd soft/tender. States no pain. VS stable; no acute distress noted.

## 2022-05-10 NOTE — SUBJECTIVE & OBJECTIVE
Interval History: Patient s/p [facility] with PCI of Lcx/OM2. Right wrist and right groin stable, no bleeding or hematoma. Denies chest pain or sob.    Review of Systems   Constitutional: Negative for chills and fever.   Cardiovascular:  Negative for chest pain, leg swelling, orthopnea and palpitations.   Respiratory:  Negative for shortness of breath and wheezing.    Objective:     Vital Signs (Most Recent):  Temp: 97.4 °F (36.3 °C) (05/10/22 1000)  Pulse: 89 (05/10/22 1000)  Resp: 17 (05/10/22 1000)  BP: 123/81 (05/10/22 1000)  SpO2: 95 % (05/10/22 1000) Vital Signs (24h Range):  Temp:  [97 °F (36.1 °C)-98.6 °F (37 °C)] 97.4 °F (36.3 °C)  Pulse:  [] 89  Resp:  [14-22] 17  SpO2:  [95 %-99 %] 95 %  BP: (121-178)/() 123/81     Weight: 81.6 kg (180 lb)  Body mass index is 32.92 kg/m².     SpO2: 95 %  O2 Device (Oxygen Therapy): room air    No intake or output data in the 24 hours ending 05/10/22 1326    Lines/Drains/Airways       Peripheral Intravenous Line  Duration                  Peripheral IV - Single Lumen 05/09/22 1652 20 G Right Antecubital <1 day         Peripheral IV - Single Lumen 05/09/22 1828 20 G Left Antecubital <1 day                    Physical Exam  Vitals reviewed.   Constitutional:       General: She is not in acute distress.  Cardiovascular:      Rate and Rhythm: Normal rate and regular rhythm.      Pulses: Normal pulses.      Heart sounds: Normal heart sounds.   Pulmonary:      Effort: Pulmonary effort is normal.      Breath sounds: Normal breath sounds. No wheezing, rhonchi or rales.   Abdominal:      General: Bowel sounds are normal.      Palpations: Abdomen is soft.   Musculoskeletal:      Right lower leg: No edema.      Left lower leg: No edema.   Neurological:      Mental Status: She is alert and oriented to person, place, and time.       Significant Labs: BMP:   Recent Labs   Lab 05/09/22  1702 05/10/22  0455   * 388*   * 133*   K 3.8 4.5   CL 97* 97*   CO2 25 22   BUN 10  15   CREATININE 0.89 0.95   CALCIUM 9.1 8.9   MG 1.7  --    , CMP   Recent Labs   Lab 05/09/22  1702 05/10/22  0455   * 133*   K 3.8 4.5   CL 97* 97*   CO2 25 22   * 388*   BUN 10 15   CREATININE 0.89 0.95   CALCIUM 9.1 8.9   PROT 7.0  --    ALBUMIN 3.5  --    BILITOT 0.4  --    ALKPHOS 95  --    AST 10*  --    ALT 22  --    ANIONGAP 15 19*   EGFRNONAA 74 68   , CBC   Recent Labs   Lab 05/09/22  1702 05/10/22  0455   WBC 12.17* 11.72*   HGB 15.1 14.6   HCT 43.8 42.5    286   , Lipid Panel No results for input(s): CHOL, HDL, LDLCALC, TRIG, CHOLHDL in the last 48 hours., and Troponin No results for input(s): TROPONINI in the last 48 hours.    Significant Imaging: Cardiac Cath: [facility] with PCI of Lcx/OM2, Echocardiogram: Transthoracic echo (TTE) complete (Cupid Only):   Results for orders placed or performed during the hospital encounter of 05/09/22   Echo   Result Value Ref Range    BSA 1.89 m2    Right Atrial Pressure (from IVC) 3 mmHg    EF 60 %    Left Ventricular Outflow Tract Mean Gradient 1.00 mmHg    AORTIC VALVE CUSP SEPERATION [id number] cm    LVIDd 4.63 3.5 - 6.0 cm    IVS 0.97 0.6 - 1.1 cm    Posterior Wall 1.04 0.6 - 1.1 cm    Ao root annulus 1.90 cm    LVIDs 2.96 2.1 - 4.0 cm    FS 36 28 - 44 %    IVC ostium 1.0 cm    LV mass 161.59 g    LA size 2.90 cm    RVDD 2.40 cm    TAPSE 2.20 cm    Left Ventricle Relative Wall Thickness 0.45 cm    AV mean gradient 4 mmHg    AV valve area 1.45 cm2    AV Velocity Ratio 0.67     AV index (prosthetic) 0.82     E wave deceleration time 200 msec    LVOT diameter 1.50 cm    LVOT area 1.8 cm2    LVOT peak grabiel 0.8 m/s    LVOT peak VTI 14.00 cm    Ao peak grabiel 1.2 m/s    Ao VTI 17.00 cm    LVOT stroke volume 24.73 cm3    AV peak gradient 6 mmHg    TV rest pulmonary artery pressure 37 mmHg    MV Peak E Grabiel 0.91 m/s    TR Max Grabiel 2.90 m/s    LV Systolic Volume 33.90 mL    LV Systolic Volume Index 18.5 mL/m2    LV Diastolic Volume 98.80 mL    LV  Diastolic Volume Index 53.99 mL/m2    LV Mass Index 88 g/m2    Echo EF Estimated 60 %    RA Major Axis 2.80 cm    Triscuspid Valve Regurgitation Peak Gradient 34 mmHg   , and X-Ray: CXR: X-Ray Chest 1 View (CXR): No results found for this visit on 05/09/22.

## 2022-05-10 NOTE — H&P
[address] Medicine  History & Physical    Patient Name: Cherry Smiley  MRN: [id number]  Patient Class: IP- Inpatient  Admission Date: 5/9/2022  Attending Physician: Jeff Yanez MD   Primary Care Provider: King Griffin MD         Patient information was obtained from patient, EMS personnel, past medical records and ER records.     Subjective:     Principal Problem:NSTEMI (non-ST elevated myocardial infarction)    Chief Complaint:   Chief Complaint   Patient presents with    Chest Pain    Hypertension        HPI: 40 yo F with chronic pain syndrome and opioid dependence presents to the ED with several days of substernal CP that has now accompanied with N/V.  She states that she has cervical stenosis and felt the arm pain was related to her chronic pain.  She also has fibromyalgia.  She also smokes, has T2DM, HTN, and hyperlipidemia.      She was found to have an NSTEMI with EKG negative for acute ischemia but initial troponins elevated.  She was taken to the cath lab by Dr. Dewey and has two stents placed in her Lcx.  She has triple vessel disease but felt she was stable at present and will reinvesitage the other lesions at a later date.  She is currently chest pain free.      She sees CHRISTY Barnes from pain management, Dr. King Dodson from orthopedics and Dr. Luisa Dewey from rheumatology for her RA.  She has also been diagnosed with epilepsy and takes Keppra.        Past Medical History:   Diagnosis Date    Allergy     Anxiety     Carpal tunnel syndrome     Congenital hypothyroidism     Depressive disorder     GERD (gastroesophageal reflux disease)     Hyperlipidemia     Hypertension     Insomnia     Irritable bowel syndrome     Lumbosacral spondylosis     NSTEMI (non-ST elevated myocardial infarction) 05/09/2022    Osteoarthritis of knee     Primary fibromyalgia syndrome     Seizures     Spinal stenosis in cervical region     Type  2 diabetes mellitus        Past Surgical History:   Procedure Laterality Date    ADENOIDECTOMY      EPIDURAL STEROID INJECTION  09/02/2020    C5-6 FABIANO - Dr Venegas    ESOPHAGOGASTRODUODENOSCOPY      HYSTERECTOMY      INJECTION OF FACET JOINT Left 12/27/2018    Left C6-7 Facet Injection - Deb    INJECTION OF FACET JOINT Right 05/19/2019    Right C4-7 Facet Injection X 2 -Deb    INJECTION OF FACET JOINT Left 02/27/2019    Left C4-7 Facet Injection -Deb    knee bilateral      RADIOFREQUENCY THERMOCOAGULATION Right 05/21/2019    Right C4-7 RFTC - Deb    RADIOFREQUENCY THERMOCOAGULATION Left 04/23/2019    Left C4-7 RFTC -Deb    shoulder bilateral      TONSILLECTOMY         Review of patient's allergies indicates:   Allergen Reactions    Cinnamon analogues Anaphylaxis    Iodine Anaphylaxis    Morpholine analogues Shortness Of Breath    Shellfish containing products Anaphylaxis    Pamelor [nortriptyline]        No current facility-administered medications on file prior to encounter.     Current Outpatient Medications on File Prior to Encounter   Medication Sig    dapagliflozin-metformin (XIGDUO XR) 5-500 mg TBph Take by mouth.    folic acid (FOLVITE) 1 MG tablet Take 1 tablet (1 mg total) by mouth once daily.    gabapentin (NEURONTIN) 300 MG capsule Take 1 capsule (300 mg total) by mouth every 8 (eight) hours.    insulin detemir U-100 (LEVEMIR FLEXTOUCH) 100 unit/mL (3 mL) SubQ InPn pen Inject into the skin every evening.    insulin NPH-insulin regular, 70/30, (NOVOLIN 70/30) 100 unit/mL (70-30) injection Inject into the skin.    levETIRAcetam (KEPPRA) 750 MG Tab Take 500 mg by mouth 2 (two) times daily.    methotrexate 2.5 MG Tab Take 6 tablets (15 mg total) by mouth once a week.    oxyCODONE-acetaminophen (PERCOCET) 7.5-325 mg per tablet Take 1 tablet by mouth every 8 (eight) hours.    [START ON 5/15/2022] oxyCODONE-acetaminophen (PERCOCET) 7.5-325 mg per tablet Take 1 tablet by mouth every 8  (eight) hours.     Family History       Problem Relation (Age of Onset)    Cancer Brother    Diabetes Mother, Brother    Heart disease Mother, Father    Hypertension Mother, Father, Brother    Lupus Father    Stroke Mother, Father          Tobacco Use    Smoking status: Current Every Day Smoker    Smokeless tobacco: Never Used   Substance and Sexual Activity    Alcohol use: Not Currently    Drug use: Never    Sexual activity: Not on file     Review of Systems   Constitutional:  Negative for appetite change, chills, fatigue, fever and unexpected weight change.   HENT:  Negative for congestion, mouth sores, nosebleeds, sinus pain, sore throat and trouble swallowing.    Eyes:  Positive for visual disturbance.   Respiratory:  Negative for apnea, cough, chest tightness and shortness of breath.    Cardiovascular:  Positive for chest pain. Negative for palpitations and leg swelling.   Gastrointestinal:  Positive for nausea and vomiting. Negative for abdominal pain, blood in stool, constipation and diarrhea.   Endocrine: Negative for polydipsia and polyuria.   Genitourinary:  Negative for decreased urine volume, difficulty urinating, dysuria and frequency.   Musculoskeletal:  Positive for arthralgias, back pain, joint swelling, myalgias and neck pain.   Skin:  Negative for rash.   Neurological:  Negative for syncope, light-headedness and headaches.   Hematological:  Does not bruise/bleed easily.   Psychiatric/Behavioral:  Negative for confusion and suicidal ideas.    Objective:     Vital Signs (Most Recent):  Temp: 97 °F (36.1 °C) (05/10/22 0014)  Pulse: 94 (05/10/22 0014)  Resp: 18 (05/10/22 0014)  BP: (!) 142/79 (05/10/22 0014)  SpO2: 97 % (05/10/22 0014)   Vital Signs (24h Range):  Temp:  [97 °F (36.1 °C)-98.6 °F (37 °C)] 97 °F (36.1 °C)  Pulse:  [] 94  Resp:  [14-22] 18  SpO2:  [97 %-99 %] 97 %  BP: (141-178)/() 142/79     Weight: 81.6 kg (180 lb)  Body mass index is 32.92 kg/m².    Physical  Exam  Constitutional:       Appearance: Normal appearance. She is obese.   HENT:      Head: Atraumatic.      Mouth/Throat:      Mouth: Mucous membranes are moist.      Pharynx: Oropharynx is clear.   Eyes:      Conjunctiva/sclera: Conjunctivae normal.      Pupils: Pupils are equal, round, and reactive to light.   Neck:      Vascular: No carotid bruit.   Cardiovascular:      Rate and Rhythm: Normal rate and regular rhythm.      Pulses: Normal pulses.      Heart sounds: Normal heart sounds.   Pulmonary:      Effort: Pulmonary effort is normal.      Breath sounds: Normal breath sounds.   Abdominal:      General: Abdomen is flat. Bowel sounds are normal.      Palpations: Abdomen is soft.   Musculoskeletal:         General: No deformity or signs of injury. Normal range of motion.      Cervical back: Neck supple.      Right lower leg: No edema.      Left lower leg: No edema.   Skin:     General: Skin is warm and dry.      Capillary Refill: Capillary refill takes 2 to 3 seconds.      Coloration: Skin is not jaundiced or pale.      Findings: No bruising, lesion or rash.   Neurological:      General: No focal deficit present.      Mental Status: She is alert and oriented to person, place, and time.   Psychiatric:         Mood and Affect: Mood normal.         CRANIAL NERVES     CN III, IV, VI   Pupils are equal, round, and reactive to light.     Significant Labs: All pertinent labs within the past 24 hours have been reviewed.    Significant Imaging: I have reviewed all pertinent imaging results/findings within the past 24 hours.    Assessment/Plan:     * NSTEMI (non-ST elevated myocardial infarction)  Patient is on asa and plavix and present.    Continue heparin infusion and possible DAPT  Statin therapy.    Will need aggressive risk factor modification to include smoking cessation.        Type 2 diabetes mellitus  Basal bolus insulin while in hospital.    Hold metformin.    Will need close follow up with her PCP for risk  factor modification.        Rheumatoid arthritis involving multiple sites with positive rheumatoid factor  Continue MTX and home percocet      Seizures  Continue home keppra and gabapentin        VTE Risk Mitigation (From admission, onward)    None             Milly Lawler MD  Department of Hospital Medicine   [address]

## 2022-05-10 NOTE — PROGRESS NOTES
[address]  Cardiology  Progress Note    Patient Name: Cherry Smiley  MRN: [id number]  Admission Date: 5/9/2022  Hospital Length of Stay: 1 days  Code Status: Full Code   Attending Physician: Jeff Yanez MD   Primary Care Physician: King Griffin MD  Expected Discharge Date:   Principal Problem:NSTEMI (non-ST elevated myocardial infarction)    Subjective:     Hospital Course:   No notes on file    Interval History: Patient s/p C with PCI of Lcx/OM2. Right wrist and right groin stable, no bleeding or hematoma. Denies chest pain or sob.    Review of Systems   Constitutional: Negative for chills and fever.   Cardiovascular:  Negative for chest pain, leg swelling, orthopnea and palpitations.   Respiratory:  Negative for shortness of breath and wheezing.    Objective:     Vital Signs (Most Recent):  Temp: 97.4 °F (36.3 °C) (05/10/22 1000)  Pulse: 89 (05/10/22 1000)  Resp: 17 (05/10/22 1000)  BP: 123/81 (05/10/22 1000)  SpO2: 95 % (05/10/22 1000) Vital Signs (24h Range):  Temp:  [97 °F (36.1 °C)-98.6 °F (37 °C)] 97.4 °F (36.3 °C)  Pulse:  [] 89  Resp:  [14-22] 17  SpO2:  [95 %-99 %] 95 %  BP: (121-178)/() 123/81     Weight: 81.6 kg (180 lb)  Body mass index is 32.92 kg/m².     SpO2: 95 %  O2 Device (Oxygen Therapy): room air    No intake or output data in the 24 hours ending 05/10/22 1326    Lines/Drains/Airways       Peripheral Intravenous Line  Duration                  Peripheral IV - Single Lumen 05/09/22 1652 20 G Right Antecubital <1 day         Peripheral IV - Single Lumen 05/09/22 1828 20 G Left Antecubital <1 day                    Physical Exam  Vitals reviewed.   Constitutional:       General: She is not in acute distress.  Cardiovascular:      Rate and Rhythm: Normal rate and regular rhythm.      Pulses: Normal pulses.      Heart sounds: Normal heart sounds.   Pulmonary:      Effort: Pulmonary effort is normal.      Breath sounds: Normal breath sounds. No  wheezing, rhonchi or rales.   Abdominal:      General: Bowel sounds are normal.      Palpations: Abdomen is soft.   Musculoskeletal:      Right lower leg: No edema.      Left lower leg: No edema.   Neurological:      Mental Status: She is alert and oriented to person, place, and time.       Significant Labs: BMP:   Recent Labs   Lab 05/09/22  1702 05/10/22  0455   * 388*   * 133*   K 3.8 4.5   CL 97* 97*   CO2 25 22   BUN 10 15   CREATININE 0.89 0.95   CALCIUM 9.1 8.9   MG 1.7  --    , CMP   Recent Labs   Lab 05/09/22  1702 05/10/22  0455   * 133*   K 3.8 4.5   CL 97* 97*   CO2 25 22   * 388*   BUN 10 15   CREATININE 0.89 0.95   CALCIUM 9.1 8.9   PROT 7.0  --    ALBUMIN 3.5  --    BILITOT 0.4  --    ALKPHOS 95  --    AST 10*  --    ALT 22  --    ANIONGAP 15 19*   EGFRNONAA 74 68   , CBC   Recent Labs   Lab 05/09/22  1702 05/10/22  0455   WBC 12.17* 11.72*   HGB 15.1 14.6   HCT 43.8 42.5    286   , Lipid Panel No results for input(s): CHOL, HDL, LDLCALC, TRIG, CHOLHDL in the last 48 hours., and Troponin No results for input(s): TROPONINI in the last 48 hours.    Significant Imaging: Cardiac Cath: [facility] with PCI of Lcx/OM2, Echocardiogram: Transthoracic echo (TTE) complete (Cupid Only):   Results for orders placed or performed during the hospital encounter of 05/09/22   Echo   Result Value Ref Range    BSA 1.89 m2    Right Atrial Pressure (from IVC) 3 mmHg    EF 60 %    Left Ventricular Outflow Tract Mean Gradient 1.00 mmHg    AORTIC VALVE CUSP SEPERATION [id number] cm    LVIDd 4.63 3.5 - 6.0 cm    IVS 0.97 0.6 - 1.1 cm    Posterior Wall 1.04 0.6 - 1.1 cm    Ao root annulus 1.90 cm    LVIDs 2.96 2.1 - 4.0 cm    FS 36 28 - 44 %    IVC ostium 1.0 cm    LV mass 161.59 g    LA size 2.90 cm    RVDD 2.40 cm    TAPSE 2.20 cm    Left Ventricle Relative Wall Thickness 0.45 cm    AV mean gradient 4 mmHg    AV valve area 1.45 cm2    AV Velocity Ratio 0.67     AV index (prosthetic) 0.82      E wave deceleration time 200 msec    LVOT diameter 1.50 cm    LVOT area 1.8 cm2    LVOT peak grabiel 0.8 m/s    LVOT peak VTI 14.00 cm    Ao peak grabiel 1.2 m/s    Ao VTI 17.00 cm    LVOT stroke volume 24.73 cm3    AV peak gradient 6 mmHg    TV rest pulmonary artery pressure 37 mmHg    MV Peak E Grabiel 0.91 m/s    TR Max Grabiel 2.90 m/s    LV Systolic Volume 33.90 mL    LV Systolic Volume Index 18.5 mL/m2    LV Diastolic Volume 98.80 mL    LV Diastolic Volume Index 53.99 mL/m2    LV Mass Index 88 g/m2    Echo EF Estimated 60 %    RA Major Axis 2.80 cm    Triscuspid Valve Regurgitation Peak Gradient 34 mmHg   , and X-Ray: CXR: X-Ray Chest 1 View (CXR): No results found for this visit on 05/09/22.    Assessment and Plan:     Brief HPI: 40 yo female with IDDM, RA, HTN, who was sent from rheumatology clinic to the ED for chest pain.      Patient report 5 days of waxing and waning SSCP htat radiates down her left arm and up her neck.   She report nausea and vomiting, diaphoresis.   10/10 that improved wto 5/10 after aspirin, nitro, IV heparin bolus.      EKG; SR with lateral ST-T depressions.   First troponin 400s    * NSTEMI (non-ST elevated myocardial infarction)  Typical CP, initial troponin 400s. EKG with lateral ST-T changes  RF - DM, RA, HTN, +Ve Fhx  Plan for urgent LHC   IV hydrcortisone 125mg and IV benadryl 50mg x1 (contrast allergy)  S/P Aspirin, statin, iv heparin    5/10:  LHC:  · The 2nd Mrg lesion was 99% stenosed with 0% stenosis post-intervention.  · The Mid Cx-1 lesion was 99% stenosed with 0% stenosis post-intervention.  · The Mid Cx-2 lesion was 99% stenosed with 0% stenosis post-intervention.  · The left ventricular end diastolic pressure was elevated.  · The pre-procedure left ventricular end diastolic pressure was 15.  · A STENT IZABELA XIENCE SKYPOINT 2.25 X 12 stent was successfully placed at 9 DAPHNEY for 10 sec.  · A stent was successfully placed at 12 DAPHNEY for 4 sec.  · The Ost LAD to Mid LAD lesion was 70%  stenosed.  · The Prox RCA to Dist RCA lesion was 70% stenosed.  · The estimated blood loss was none.  · There was three vessel coronary artery disease.     1. Three vessel coronary artery disease ( 70% pLAD - iFR 0.78,  99% thrombotic occlusion of OM2, and 70% RCA)  2. Slightly elevated left sided filling (LVEDP = 15mmHg)  3. S/P successful bifurcation PCI of LCx/OM2 with IZABELA (ZOILA Vazquez)     Plan:  1. DAPT for 1 years (brilinta/asa)  2. Medical management for residual disease; if persistent anginal symptoms will refer for CABG  3. Risk factor modification and life style changes. (smoking cessation)  4. Lipid panel, echo    Continue asa, brilinta, statin, BB, and lisinopril at discharge. Smoking cessation and cardiac rehab for NSTEMI. Okay to discharge from cardiology standpoint and follow up with Dr. Dewey in 2 weeks.      Type 2 diabetes mellitus  - Spoke with Dr. Yanez, blood sugars 400. Plan to keep patient until blood sugar better controlled    Rheumatoid arthritis involving multiple sites with positive rheumatoid factor  - Followed by rheumatology         VTE Risk Mitigation (From admission, onward)    None          Debby Silva, JADENP  Cardiology  [facility]

## 2022-05-11 VITALS
RESPIRATION RATE: 18 BRPM | DIASTOLIC BLOOD PRESSURE: 51 MMHG | WEIGHT: 191.38 LBS | SYSTOLIC BLOOD PRESSURE: 101 MMHG | HEART RATE: 71 BPM | OXYGEN SATURATION: 97 % | HEIGHT: 62 IN | BODY MASS INDEX: 35.22 KG/M2 | TEMPERATURE: 98 F

## 2022-05-11 LAB
ANION GAP SERPL CALCULATED.3IONS-SCNC: 13 MMOL/L (ref 7–16)
BASOPHILS # BLD AUTO: 0.07 K/UL (ref 0–0.2)
BASOPHILS NFR BLD AUTO: 0.5 % (ref 0–1)
BUN SERPL-MCNC: 22 MG/DL (ref 7–18)
BUN/CREAT SERPL: 25 (ref 6–20)
CALCIUM SERPL-MCNC: 8.5 MG/DL (ref 8.5–10.1)
CHLORIDE SERPL-SCNC: 102 MMOL/L (ref 98–107)
CHOLEST SERPL-MCNC: 161 MG/DL (ref 0–200)
CHOLEST/HDLC SERPL: 5.2 {RATIO}
CO2 SERPL-SCNC: 26 MMOL/L (ref 21–32)
CREAT SERPL-MCNC: 0.89 MG/DL (ref 0.55–1.02)
DIFFERENTIAL METHOD BLD: ABNORMAL
EOSINOPHIL # BLD AUTO: 0.16 K/UL (ref 0–0.5)
EOSINOPHIL NFR BLD AUTO: 1.1 % (ref 1–4)
ERYTHROCYTE [DISTWIDTH] IN BLOOD BY AUTOMATED COUNT: 12.8 % (ref 11.5–14.5)
GLUCOSE SERPL-MCNC: 117 MG/DL (ref 70–105)
GLUCOSE SERPL-MCNC: 118 MG/DL (ref 70–105)
GLUCOSE SERPL-MCNC: 125 MG/DL (ref 74–106)
GLUCOSE SERPL-MCNC: 127 MG/DL (ref 70–105)
GLUCOSE SERPL-MCNC: 130 MG/DL (ref 70–105)
HCT VFR BLD AUTO: 35.8 % (ref 38–47)
HDLC SERPL-MCNC: 31 MG/DL (ref 40–60)
HGB BLD-MCNC: 12.3 G/DL (ref 12–16)
IMM GRANULOCYTES # BLD AUTO: 0.04 K/UL (ref 0–0.04)
IMM GRANULOCYTES NFR BLD: 0.3 % (ref 0–0.4)
LDLC SERPL CALC-MCNC: 105 MG/DL
LDLC/HDLC SERPL: 3.4 {RATIO}
LYMPHOCYTES # BLD AUTO: 4.58 K/UL (ref 1–4.8)
LYMPHOCYTES NFR BLD AUTO: 31.5 % (ref 27–41)
MCH RBC QN AUTO: 32 PG (ref 27–31)
MCHC RBC AUTO-ENTMCNC: 34.4 G/DL (ref 32–36)
MCV RBC AUTO: 93.2 FL (ref 80–96)
MONOCYTES # BLD AUTO: 0.79 K/UL (ref 0–0.8)
MONOCYTES NFR BLD AUTO: 5.4 % (ref 2–6)
MPC BLD CALC-MCNC: 10.7 FL (ref 9.4–12.4)
NEUTROPHILS # BLD AUTO: 8.9 K/UL (ref 1.8–7.7)
NEUTROPHILS NFR BLD AUTO: 61.2 % (ref 53–65)
NONHDLC SERPL-MCNC: 130 MG/DL
NRBC # BLD AUTO: 0 X10E3/UL
NRBC, AUTO (.00): 0 %
PLATELET # BLD AUTO: 262 K/UL (ref 150–400)
POTASSIUM SERPL-SCNC: 3.5 MMOL/L (ref 3.5–5.1)
RBC # BLD AUTO: 3.84 M/UL (ref 4.2–5.4)
SODIUM SERPL-SCNC: 137 MMOL/L (ref 136–145)
TRIGL SERPL-MCNC: 126 MG/DL (ref 35–150)
VLDLC SERPL-MCNC: 25 MG/DL
WBC # BLD AUTO: 14.54 K/UL (ref 4.5–11)

## 2022-05-11 PROCEDURE — 82962 GLUCOSE BLOOD TEST: CPT

## 2022-05-11 PROCEDURE — 63600175 PHARM REV CODE 636 W HCPCS: Performed by: HOSPITALIST

## 2022-05-11 PROCEDURE — 80048 BASIC METABOLIC PNL TOTAL CA: CPT | Performed by: STUDENT IN AN ORGANIZED HEALTH CARE EDUCATION/TRAINING PROGRAM

## 2022-05-11 PROCEDURE — 80061 LIPID PANEL: CPT | Performed by: NURSE PRACTITIONER

## 2022-05-11 PROCEDURE — 25000003 PHARM REV CODE 250: Performed by: HOSPITALIST

## 2022-05-11 PROCEDURE — 99239 HOSP IP/OBS DSCHRG MGMT >30: CPT | Mod: ,,, | Performed by: HOSPITALIST

## 2022-05-11 PROCEDURE — 36415 COLL VENOUS BLD VENIPUNCTURE: CPT | Performed by: STUDENT IN AN ORGANIZED HEALTH CARE EDUCATION/TRAINING PROGRAM

## 2022-05-11 PROCEDURE — 25000003 PHARM REV CODE 250: Performed by: STUDENT IN AN ORGANIZED HEALTH CARE EDUCATION/TRAINING PROGRAM

## 2022-05-11 PROCEDURE — 63600175 PHARM REV CODE 636 W HCPCS: Performed by: STUDENT IN AN ORGANIZED HEALTH CARE EDUCATION/TRAINING PROGRAM

## 2022-05-11 PROCEDURE — 85025 COMPLETE CBC W/AUTO DIFF WBC: CPT | Performed by: STUDENT IN AN ORGANIZED HEALTH CARE EDUCATION/TRAINING PROGRAM

## 2022-05-11 PROCEDURE — 99239 PR HOSPITAL DISCHARGE DAY,>30 MIN: ICD-10-PCS | Mod: ,,, | Performed by: HOSPITALIST

## 2022-05-11 RX ORDER — ASPIRIN 81 MG/1
81 TABLET ORAL DAILY
Refills: 0
Start: 2022-05-12 | End: 2024-02-01

## 2022-05-11 RX ORDER — LEVETIRACETAM 750 MG/1
750 TABLET ORAL 2 TIMES DAILY
Qty: 60 TABLET | Refills: 11
Start: 2022-05-11 | End: 2023-12-05

## 2022-05-11 RX ORDER — METOPROLOL SUCCINATE 25 MG/1
25 TABLET, EXTENDED RELEASE ORAL DAILY
Qty: 30 TABLET | Refills: 0 | Status: CANCELLED | OUTPATIENT
Start: 2022-05-12 | End: 2023-05-12

## 2022-05-11 RX ORDER — ATORVASTATIN CALCIUM 80 MG/1
80 TABLET, FILM COATED ORAL NIGHTLY
Qty: 30 TABLET | Refills: 0 | Status: CANCELLED | OUTPATIENT
Start: 2022-05-11 | End: 2023-05-11

## 2022-05-11 RX ORDER — ATORVASTATIN CALCIUM 80 MG/1
80 TABLET, FILM COATED ORAL NIGHTLY
Qty: 30 TABLET | Refills: 0 | Status: SHIPPED | OUTPATIENT
Start: 2022-05-11 | End: 2022-09-19 | Stop reason: SDUPTHER

## 2022-05-11 RX ORDER — LISINOPRIL 10 MG/1
10 TABLET ORAL DAILY
Qty: 30 TABLET | Refills: 0 | Status: SHIPPED | OUTPATIENT
Start: 2022-05-12 | End: 2022-05-31 | Stop reason: SDUPTHER

## 2022-05-11 RX ORDER — METOPROLOL SUCCINATE 25 MG/1
25 TABLET, EXTENDED RELEASE ORAL DAILY
Qty: 30 TABLET | Refills: 0 | Status: SHIPPED | OUTPATIENT
Start: 2022-05-12 | End: 2022-05-31 | Stop reason: SDUPTHER

## 2022-05-11 RX ORDER — LISINOPRIL 10 MG/1
10 TABLET ORAL DAILY
Qty: 30 TABLET | Refills: 0 | Status: CANCELLED | OUTPATIENT
Start: 2022-05-11 | End: 2023-05-11

## 2022-05-11 RX ORDER — ASPIRIN 81 MG/1
81 TABLET ORAL DAILY
Refills: 0 | Status: CANCELLED
Start: 2022-05-12 | End: 2023-05-12

## 2022-05-11 RX ADMIN — ASPIRIN 81 MG: 81 TABLET, COATED ORAL at 08:05

## 2022-05-11 RX ADMIN — LEVETIRACETAM 750 MG: 750 TABLET, FILM COATED ORAL at 08:05

## 2022-05-11 RX ADMIN — TICAGRELOR 90 MG: 90 TABLET ORAL at 08:05

## 2022-05-11 RX ADMIN — FOLIC ACID 1 MG: 1 TABLET ORAL at 09:05

## 2022-05-11 RX ADMIN — HUMAN INSULIN 14 UNITS: 100 INJECTION, SUSPENSION SUBCUTANEOUS at 09:05

## 2022-05-11 RX ADMIN — OXYCODONE AND ACETAMINOPHEN 2 TABLET: 5; 325 TABLET ORAL at 08:05

## 2022-05-11 RX ADMIN — LISINOPRIL 10 MG: 10 TABLET ORAL at 09:05

## 2022-05-11 RX ADMIN — GABAPENTIN 300 MG: 300 CAPSULE ORAL at 09:05

## 2022-05-11 RX ADMIN — METOPROLOL SUCCINATE 25 MG: 25 TABLET, FILM COATED, EXTENDED RELEASE ORAL at 08:05

## 2022-05-11 RX ADMIN — TRAZODONE HYDROCHLORIDE 50 MG: 50 TABLET ORAL at 12:05

## 2022-05-11 RX ADMIN — METHOTREXATE SODIUM 15 MG: 2.5 TABLET ORAL at 09:05

## 2022-05-11 NOTE — ASSESSMENT & PLAN NOTE
Needs f/u outpt in lost CPAP machine    05/11--Follow up with [facility] sleep [facility] outpt

## 2022-05-11 NOTE — DISCHARGE INSTRUCTIONS
Continue to monitor Blood Glucose 4 x's a day and maintain a log.  Notify pcp of out of range readings promptly.  Obtain a blood pressure cuff and monitor blood pressure/heart rate daily, maintain a log and notify pcp of out of range readings promptly.

## 2022-05-11 NOTE — PROGRESS NOTES
[facility] - [address] [place] [address] Medicine  Progress Note    Patient Name: Cherry Smiley  MRN: [id number]  Patient Class: IP- Inpatient   Admission Date: 5/9/2022  Length of Stay: 1 days  Attending Physician: Jeff Yanez MD  Primary Care Provider: King Griffin MD        Subjective:     Principal Problem:NSTEMI (non-ST elevated myocardial infarction)        HPI:  42 yo F with chronic pain syndrome and opioid dependence presents to the ED with several days of substernal CP that has now accompanied with N/V.  She states that she has cervical stenosis and felt the arm pain was related to her chronic pain.  She also has fibromyalgia.  She also smokes, has T2DM, HTN, and hyperlipidemia.      She was found to have an NSTEMI with EKG negative for acute ischemia but initial troponins elevated.  She was taken to the cath lab by Dr. Dewey and has two stents placed in her Lcx.  She has triple vessel disease but felt she was stable at present and will reinvesitage the other lesions at a later date.  She is currently chest pain free.      She sees CHRISTY Barnes from pain management, Dr. King Dodson from orthopedics and Dr. Luisa Dewey from rheumatology for her RA.  She has also been diagnosed with epilepsy and takes Keppra.        Overview/Hospital Course:  05/10 - results C noted. Talked about control risk factors. Pt says recent change in home DM meds and says BS worse since. Adjust insulin. Increase activity. Doing well home soon.  Meds change to home doses. Prior on CPAP but lost machine three years ago. Prior had seen Dr Soria in [place]. Can get f/u as outpt.      Interval History:     Review of Systems   Constitutional:  Negative for appetite change, fatigue and fever.   HENT:  Negative for congestion, hearing loss and trouble swallowing.    Respiratory:  Positive for shortness of breath. Negative for chest tightness and wheezing.    Cardiovascular:  Positive for chest  pain. Negative for palpitations.   Gastrointestinal:  Negative for abdominal pain, constipation and nausea.   Genitourinary:  Negative for difficulty urinating and dysuria.   Musculoskeletal:  Negative for back pain and neck stiffness.   Skin:  Negative for pallor and rash.   Neurological:  Negative for dizziness, speech difficulty and headaches.   Psychiatric/Behavioral:  Positive for sleep disturbance. Negative for confusion and suicidal ideas.    Objective:     Vital Signs (Most Recent):  Temp: 97.8 °F (36.6 °C) (05/10/22 2000)  Pulse: 82 (05/10/22 2000)  Resp: 16 (05/10/22 2000)  BP: 99/63 (05/10/22 2000)  SpO2: 96 % (05/10/22 2000) Vital Signs (24h Range):  Temp:  [97 °F (36.1 °C)-97.9 °F (36.6 °C)] 97.8 °F (36.6 °C)  Pulse:  [82-98] 82  Resp:  [16-18] 16  SpO2:  [95 %-97 %] 96 %  BP: ()/(63-94) 99/63     Weight: 81.6 kg (180 lb)  Body mass index is 32.92 kg/m².  No intake or output data in the 24 hours ending 05/10/22 2325   Physical Exam    Significant Labs: All pertinent labs within the past 24 hours have been reviewed.  BMP:   Recent Labs   Lab 05/09/22  1702 05/10/22  0455   * 388*   * 133*   K 3.8 4.5   CL 97* 97*   CO2 25 22   BUN 10 15   CREATININE 0.89 0.95   CALCIUM 9.1 8.9   MG 1.7  --      CBC:   Recent Labs   Lab 05/09/22  1702 05/10/22  0455   WBC 12.17* 11.72*   HGB 15.1 14.6   HCT 43.8 42.5    286     CMP:   Recent Labs   Lab 05/09/22  1702 05/10/22  0455   * 133*   K 3.8 4.5   CL 97* 97*   CO2 25 22   * 388*   BUN 10 15   CREATININE 0.89 0.95   CALCIUM 9.1 8.9   PROT 7.0  --    ALBUMIN 3.5  --    BILITOT 0.4  --    ALKPHOS 95  --    AST 10*  --    ALT 22  --    ANIONGAP 15 19*   EGFRNONAA 74 68       Significant Imaging: I have reviewed all pertinent imaging results/findings within the past 24 hours.    Imaging Results              X-Ray Chest PA And Lateral (Final result)  Result time 05/09/22 17:20:24      Final result by Davian Diego DO (05/09/22  17:20:24)                   Impression:      As above.      Electronically signed by: Davian Diego  Date:    05/09/2022  Time:    17:20               Narrative:    EXAMINATION:  XR CHEST PA AND LATERAL    CLINICAL HISTORY:  Chest Pain;    TECHNIQUE:  XR CHEST PA AND LATERAL    COMPARISON:  Comparisons were reviewed, if available.    FINDINGS:  No lines or tubes.    Lungs are clear.    Normal pleura.    Cardiac silhouette is similar to comparison exam.    No new acute bone findings.                                    Intake/Output - Last 3 Shifts         05/09 0700  05/10 0659 05/10 0700 05/11 0659          Urine Occurrence 2 x     Stool Occurrence 0 x           Microbiology Results (last 7 days)       ** No results found for the last 168 hours. **                Assessment/Plan:      * NSTEMI (non-ST elevated myocardial infarction)  Patient is on asa and plavix and present.    Continue heparin infusion and possible DAPT  Statin therapy.    Will need aggressive risk factor modification to include smoking cessation.        RAUL (obstructive sleep apnea)  Needs f/u outpt in lost CPAP machine    Seizures  Continue home keppra and gabapentin      Type 2 diabetes mellitus  Basal bolus insulin while in hospital.    Hold metformin.    Will need close follow up with her PCP for risk factor modification.        Rheumatoid arthritis involving multiple sites with positive rheumatoid factor  Continue MTX and home percocet        VTE Risk Mitigation (From admission, onward)    None          Discharge Planning   SAMUEL:      Code Status: Full Code   Is the patient medically ready for discharge?:     Reason for patient still in hospital (select all that apply): Laboratory test, Treatment, Imaging and Consult recommendations  Discharge Plan A: Home with family, Home Health                  Jeff Yanez MD  Department of Hospital Medicine   [address]

## 2022-05-11 NOTE — ASSESSMENT & PLAN NOTE
Patient is on asa and plavix and present.    Continue heparin infusion and possible DAPT  Statin therapy.    Will need aggressive risk factor modification to include smoking cessation.      -[facility]:  1. Three vessel coronary artery disease ( 70% pLAD - iFR 0.78,  99% thrombotic occlusion of OM2, and 70% RCA)  2. Slightly elevated left sided filling (LVEDP = 15mmHg)  3. S/P successful bifurcation PCI of LCx/OM2 with IZABELA (ZOILA Vazquez)     Plan:  1. DAPT for 1 years  2. Medical management for residual disease; if persistent anginal symptoms will refer for CABG  3. Risk factor modification and life style changes.     05/11--Follow Card recs, d/c with Ace, Statin, Asa, Brilinta, and BB.  Consult placed for cardiac rehab on yesterday.  Follow up in 2 weeks with Dr. Lewis.

## 2022-05-11 NOTE — DISCHARGE SUMMARY
"[facility], [place] - [address] [place] [address] Medicine  Discharge Summary      Patient Name: Cherry Smiley  MRN: [id number]  Patient Class: IP- Inpatient  Admission Date: 5/9/2022  Hospital Length of Stay: 2 days  Discharge Date and Time:  05/11/2022 11:41 AM  Attending Physician: Jeff Yanez MD   Discharging Provider: SANDI Banks  Primary Care Provider: King Griffin MD      HPI:   42 yo F with chronic pain syndrome and opioid dependence presents to the ED with several days of substernal CP that has now accompanied with N/V.  She states that she has cervical stenosis and felt the arm pain was related to her chronic pain.  She also has fibromyalgia.  She also smokes, has T2DM, HTN, and hyperlipidemia.      She was found to have an NSTEMI with EKG negative for acute ischemia but initial troponins elevated.  She was taken to the cath lab by Dr. Dewey and has two stents placed in her Lcx.  She has triple vessel disease but felt she was stable at present and will reinvesitage the other lesions at a later date.  She is currently chest pain free.      She sees CHRISTY Barnes from pain management, Dr. King Dodson from orthopedics and Dr. Luisa Dewey from rheumatology for her RA.  She has also been diagnosed with epilepsy and takes Keppra.        Procedure(s) (LRB):  Angiogram, Coronary, with Left Heart Cath (N/A)  Percutaneous coronary intervention (N/A)      Hospital Course:   05/10 - results LHC noted. Talked about control risk factors. Pt says recent change in home DM meds and says BS worse since. Adjust insulin. Increase activity. Doing well home soon.  Meds change to home doses. Prior on CPAP but lost machine three years ago. Prior had seen Dr Soria in [place]. Can get f/u as outpt.  05/11--Pt doing well, voiced that she feels good enough to "go home" today.  Blood glucose readings have improved and ranged from 117-127 this AM.  Dose of 70/30 insulin was not given this " AM.  Novolin N 14 units ordered this AM and blood sugar to be rechecked prior to discharge. Home Insulin adjusted during this admission and pt will d/c on increased dose.  Can restart Metformin tomorrow.  Pt monitors glucose QID at home and also has a sliding scale.  Is aware of goals/parameters for her blood glucose and when to hold medications.  Pt to continuing monitoring blood glucose at home, maintain a log, and discuss readings with PCP at hospital follow up visit.  Discussed risk factor reduction at length with pt this AM.  Pt was admitted for NSTEMI, underwent LHC notable for three vessel CAD; successful bifurcation PCI of LCx/OM2 with IZABELA.  Recs are for DAPT x's 1 year, medical management, and risk factor modification.  Pt will need to follow up with cardiology outpatient.  Pt has history of RAUL, however, has not had CPAP in 3 years, will have patient to follow up with sleep study outpatient.  Also states has dental caries and just completed a round of ABT piror to admission.  Instructed on the importance of not stopping blood thinners without cardiology approval. Pt will follow up with Dentist at [facility] outpatient and will discuss this at her hosp follow up visit with cardiology.  Pt also admits to not having an eye exam x's 2-3 years, strongly encouraged to schedule eye exam ASAP and to have this done annually. Pt to follow up with PCP in 1 week; instructed on goals/parameters for her blood pressure and encouraged her to obtain a BP cuff to monitor blood pressure and heart rate at home.  Maintain a log of BP & HR readings and discuss these with PCP.  Literature provided regarding Brilinta.  Has met maximum benefit from this hospitalization and is stable for discharge.              Goals of Care Treatment Preferences:  Code Status: Full Code      Consults:   Consults (From admission, onward)        Status Ordering Provider     Inpatient consult to Social Work  Once        Provider:  (Not yet assigned)     Completed ARTHUR RAUSCH     Inpatient consult to Social Work  Once        Provider:  (Not yet assigned)    Completed MADELYN GARIBAY     Inpatient consult to Cardiology  Once        Provider:  Arthur Rausch MD    Completed ADIA SPENCER          * NSTEMI (non-ST elevated myocardial infarction)  Patient is on asa and plavix and present.    Continue heparin infusion and possible DAPT  Statin therapy.    Will need aggressive risk factor modification to include smoking cessation.      -[facility]:  1. Three vessel coronary artery disease ( 70% pLAD - iFR 0.78,  99% thrombotic occlusion of OM2, and 70% RCA)  2. Slightly elevated left sided filling (LVEDP = 15mmHg)  3. S/P successful bifurcation PCI of LCx/OM2 with IZABELA (ZOILA Vazquez)     Plan:  1. DAPT for 1 years  2. Medical management for residual disease; if persistent anginal symptoms will refer for CABG  3. Risk factor modification and life style changes.     05/11--Follow Card recs, d/c with Ace, Statin, Asa, Brilinta, and BB.  Consult placed for cardiac rehab on yesterday.  Follow up in 2 weeks with Dr. Lewis.           RAUL (obstructive sleep apnea)  Needs f/u outpt in lost CPAP machine    05/11--Follow up with [facility] outpt    Seizures  Continue home keppra and gabapentin      Type 2 diabetes mellitus  Basal bolus insulin while in hospital.    Hold metformin.    Will need close follow up with her PCP for risk factor modification.      05/11--Blood sugars well managed on current regimen, continue at discharge, can resume Metformin tomorrow.  Continue to monitor home glucose readings QID, maintain a log and discuss readings with pcp.       Rheumatoid arthritis involving multiple sites with positive rheumatoid factor  Continue MTX and home percocet        Final Active Diagnoses:    Diagnosis Date Noted POA    PRINCIPAL PROBLEM:  NSTEMI (non-ST elevated myocardial infarction) [I21.4] 05/09/2022 Yes    RAUL (obstructive sleep apnea) [G47.33] 05/10/2022  Yes    Type 2 diabetes mellitus [E11.9]  Yes    Seizures [R56.9]  Yes    Rheumatoid arthritis involving multiple sites with positive rheumatoid factor [M05.79]  Yes     Chronic      Problems Resolved During this Admission:       Discharged Condition: stable    Disposition: Home or Self Care    Follow Up:   Contact information for follow-up providers     Arhtur Dewey MD Follow up in 2 week(s).    Specialties: Interventional Cardiology, Cardiology  Why: Hospital follow up for Nstemi  Contact information:  [address] [place] [address]  [phone]             King Griffin MD Follow up in 1 week(s).    Specialty: Family Medicine  Why: Hospital follow up  Contact information:  [address] [facility] [place] [address]  [phone]             [facility] Follow up.    Why: Next available appointment with [facility] sleep center for hx of RAUL but has not CPAP x's 3 years  Contact information:  [address] [facility] [address]  [phone]                   Contact information for after-discharge care     Home Medical Care     [facility] .    Service: Home Health Services  Contact information:  [address] [facility] [address]  [phone]                           Patient Instructions:      Diet diabetic     Notify your health care provider if you experience any of the following:  temperature >100.4     Notify your health care provider if you experience any of the following:  persistent nausea and vomiting or diarrhea     Notify your health care provider if you experience any of the following:  severe uncontrolled pain     Notify your health care provider if you experience any of the following:  redness, tenderness, or signs of infection (pain, swelling, redness, odor or green/yellow discharge around incision site)     Notify your health care provider if you experience any of the following:  difficulty  breathing or increased cough     Notify your health care provider if you experience any of the following:  severe persistent headache     Notify your health care provider if you experience any of the following:  worsening rash     Notify your health care provider if you experience any of the following:  persistent dizziness, light-headedness, or visual disturbances     Notify your health care provider if you experience any of the following:  increased confusion or weakness     Activity as tolerated       Significant Diagnostic Studies: Labs:   BMP:   Recent Labs   Lab 05/09/22  1702 05/10/22  0455 05/11/22  0422   * 388* 125*   * 133* 137   K 3.8 4.5 3.5   CL 97* 97* 102   CO2 25 22 26   BUN 10 15 22*   CREATININE 0.89 0.95 0.89   CALCIUM 9.1 8.9 8.5   MG 1.7  --   --    , CBC   Recent Labs   Lab 05/09/22  1702 05/10/22  0455 05/11/22  0422   WBC 12.17* 11.72* 14.54*   HGB 15.1 14.6 12.3   HCT 43.8 42.5 35.8*    286 262    and A1C: No results for input(s): HGBA1C in the last 4320 hours.  Cardiac Graphics: Echocardiogram:   Transthoracic echo (TTE) complete (Cupid Only):   Results for orders placed or performed during the hospital encounter of 05/09/22   Echo   Result Value Ref Range    BSA 1.89 m2    Right Atrial Pressure (from IVC) 3 mmHg    EF 60 %    Left Ventricular Outflow Tract Mean Gradient 1.00 mmHg    AORTIC VALVE CUSP SEPERATION [id number] cm    LVIDd 4.63 3.5 - 6.0 cm    IVS 0.97 0.6 - 1.1 cm    Posterior Wall 1.04 0.6 - 1.1 cm    Ao root annulus 1.90 cm    LVIDs 2.96 2.1 - 4.0 cm    FS 36 28 - 44 %    IVC ostium 1.0 cm    LV mass 161.59 g    LA size 2.90 cm    RVDD 2.40 cm    TAPSE 2.20 cm    Left Ventricle Relative Wall Thickness 0.45 cm    AV mean gradient 4 mmHg    AV valve area 1.45 cm2    AV Velocity Ratio 0.67     AV index (prosthetic) 0.82     E wave deceleration time 200 msec    LVOT diameter 1.50 cm    LVOT area 1.8 cm2    LVOT peak fernie 0.8 m/s    LVOT peak VTI 14.00  cm    Ao peak grabiel 1.2 m/s    Ao VTI 17.00 cm    LVOT stroke volume 24.73 cm3    AV peak gradient 6 mmHg    TV rest pulmonary artery pressure 37 mmHg    MV Peak E Grabiel 0.91 m/s    TR Max Grabiel 2.90 m/s    LV Systolic Volume 33.90 mL    LV Systolic Volume Index 18.5 mL/m2    LV Diastolic Volume 98.80 mL    LV Diastolic Volume Index 53.99 mL/m2    LV Mass Index 88 g/m2    Echo EF Estimated 60 %    RA Major Axis 2.80 cm    Triscuspid Valve Regurgitation Peak Gradient 34 mmHg    Narrative    · The left ventricle is normal in size with normal systolic function.  · The estimated ejection fraction is 60%.  · Normal right ventricular size with normal right ventricular systolic   function.  · Mild tricuspid regurgitation.  · Normal central venous pressure (3 mmHg).  · The estimated PA systolic pressure is 37 mmHg.          Pending Diagnostic Studies:     Procedure Component Value Units Date/Time    EKG 12-lead [[id number]] Collected: 05/09/22 1751    Order Status: Sent Lab Status: In process Updated: 05/10/22 0611    Narrative:      Test Reason : R94.31,    Vent. Rate : 096 BPM     Atrial Rate : 000 BPM     P-R Int : 104 ms          QRS Dur : 080 ms      QT Int : 362 ms       P-R-T Axes : 073 019 071 degrees     QTc Int : 425 ms    Sinus rhythm  Short IA interval  Poor R wave progression  Lateral ST-T abnormality  is nonspecific  Low QRS voltages in precordial leads  Borderline ECG      Referred By: [email or web address]   SELF           Confirmed By:     EKG 12-lead [[id number]] Collected: 05/09/22 1623    Order Status: Sent Lab Status: In process Updated: 05/10/22 0600    Narrative:      Test Reason : R07.9,    Vent. Rate : 093 BPM     Atrial Rate : 000 BPM     P-R Int : 104 ms          QRS Dur : 074 ms      QT Int : 350 ms       P-R-T Axes : -08 057 005 degrees     QTc Int : 408 ms    Sinus rhythm  with PVC(s)  Short IA interval  Poor R wave progression  Inferior/lateral ST-T abnormality  may be due to myocardial ischemia  Low QRS voltages  in precordial leads  Abnormal ECG      Referred By: [email or web address]   SELF           Confirmed By:     Lipid Panel [[id number]] Collected: 05/11/22 0422    Order Status: Sent Lab Status: No result     Specimen: Blood          Medications:  Reconciled Home Medications:      Medication List      START taking these medications    aspirin 81 MG EC tablet  Commonly known as: ECOTRIN  Take 1 tablet (81 mg total) by mouth once daily.  Start taking on: May 12, 2022     atorvastatin 80 MG tablet  Commonly known as: LIPITOR  Take 1 tablet (80 mg total) by mouth every evening.     insulin detemir U-100 100 unit/mL injection  Commonly known as: Levemir  Inject 40 Units into the skin every evening.  Replaces: insulin detemir U-100 100 unit/mL (3 mL) Inpn pen     lisinopriL 10 MG tablet  Take 1 tablet (10 mg total) by mouth once daily.  Start taking on: May 12, 2022     metoprolol succinate 25 MG 24 hr tablet  Commonly known as: TOPROL-XL  Take 1 tablet (25 mg total) by mouth once daily.  Start taking on: May 12, 2022     ticagrelor 90 mg tablet  Commonly known as: BRILINTA  Take 1 tablet (90 mg total) by mouth 2 (two) times daily.        CHANGE how you take these medications    levETIRAcetam 750 MG Tab  Commonly known as: KEPPRA  Take 1 tablet (750 mg total) by mouth 2 (two) times daily.  What changed: how much to take        CONTINUE taking these medications    dapagliflozin-metformin 5-500 mg Tbph  Commonly known as: XIGDUO XR  Take by mouth.     folic acid 1 MG tablet  Commonly known as: FOLVITE  Take 1 tablet (1 mg total) by mouth once daily.     gabapentin 300 MG capsule  Commonly known as: NEURONTIN  Take 1 capsule (300 mg total) by mouth every 8 (eight) hours.     insulin NPH-insulin regular (70/30) 100 unit/mL (70-30) injection  Commonly known as: NovoLIN 70/30  Inject into the skin.     methotrexate 2.5 MG Tab  Take 6 tablets (15 mg total) by mouth once a week.     * oxyCODONE-acetaminophen 7.5-325 mg per tablet  Commonly  known as: PERCOCET  Take 1 tablet by mouth every 8 (eight) hours.     * oxyCODONE-acetaminophen 7.5-325 mg per tablet  Commonly known as: PERCOCET  Take 1 tablet by mouth every 8 (eight) hours.  Start taking on: May 15, 2022         * This list has 2 medication(s) that are the same as other medications prescribed for you. Read the directions carefully, and ask your doctor or other care provider to review them with you.            STOP taking these medications    insulin detemir U-100 100 unit/mL (3 mL) Inpn pen  Commonly known as: Levemir FLEXTOUCH  Replaced by: insulin detemir U-100 100 unit/mL injection            Indwelling Lines/Drains at time of discharge:   Lines/Drains/Airways     None                 Time spent on the discharge of patient: >30 minutes         SANDI Banks  Department of Hospital Medicine  [address]

## 2022-05-11 NOTE — PLAN OF CARE
Problem: Adult Inpatient Plan of Care  Goal: Plan of Care Review  Outcome: Met  Goal: Patient-Specific Goal (Individualized)  Outcome: Met  Goal: Absence of Hospital-Acquired Illness or Injury  Outcome: Met  Goal: Optimal Comfort and Wellbeing  Outcome: Met  Goal: Readiness for Transition of Care  Outcome: Met     Problem: Fall Injury Risk  Goal: Absence of Fall and Fall-Related Injury  Outcome: Met     Problem: Diabetes Comorbidity  Goal: Blood Glucose Level Within Targeted Range  Outcome: Met     Problem: Adjustment to Illness (Acute Coronary Syndrome)  Goal: Optimal Adaptation to Illness  Outcome: Met     Problem: Dysrhythmia (Acute Coronary Syndrome)  Goal: Normalized Cardiac Rhythm  Outcome: Met     Problem: Cardiac-Related Pain (Acute Coronary Syndrome)  Goal: Absence of Cardiac-Related Pain  Outcome: Met     Problem: Hemodynamic Instability (Acute Coronary Syndrome)  Goal: Effective Cardiac Pump Function  Outcome: Met     Problem: Tissue Perfusion (Acute Coronary Syndrome)  Goal: Adequate Tissue Perfusion  Outcome: Met   Pt is being discharged

## 2022-05-11 NOTE — ASSESSMENT & PLAN NOTE
Basal bolus insulin while in hospital.    Hold metformin.    Will need close follow up with her PCP for risk factor modification.      05/11--Blood sugars well managed on current regimen, continue at discharge, can resume Metformin tomorrow.  Continue to monitor home glucose readings QID, maintain a log and discuss readings with pcp.

## 2022-05-11 NOTE — PLAN OF CARE
[facility] [place] - [address] [facility]  Discharge Final Note    Primary Care Provider: King Griffin MD    Expected Discharge Date: 5/11/2022    Final Discharge Note (most recent)     Final Note - 05/11/22 1005        Final Note    Assessment Type Final Discharge Note     Anticipated Discharge Disposition Home-Health Care Svc        Post-Acute Status    Post-Acute Authorization Home Health     Home Health Status Set-up Complete/Auth obtained     Patient choice form signed by patient/caregiver List with quality metrics by geographic area provided;List from CMS Compare;List from System Post-Acute Care     Discharge Delays None known at this time               Pt d/c home with favian lainez  Important Message from Medicare  Important Message from Medicare regarding Discharge Appeal Rights: Given to patient/caregiver, Explained to patient/caregiver, Signed/date by patient/caregiver     Date IMM was signed: 05/10/22  Time IMM was signed: 1211     Follow-up providers     Arthur Dewey MD   Specialty: Interventional Cardiology, Cardiology    [address] [place] [address]   Phone: [phone]       Next Steps: Follow up in 2 week(s)    Instructions: Hospital follow up for Nstemi    King Griffin MD   Specialty: Family Medicine   Relationship: PCP - General    [address] [facility] [place] [address]   Phone: [phone]       Next Steps: Follow up in 1 week(s)    Instructions: Hospital follow up          After-discharge care            Home Medical Care     [facility]   Service: Home Health Services    [address] [address]   Phone: [phone]

## 2022-05-11 NOTE — PLAN OF CARE
Care plan reviewed and interventions implemented.  Ongoing and progressing.  Continue to monitor patient, status, and care plan to reflect needed changes in plan of care.

## 2022-05-11 NOTE — HOSPITAL COURSE
"05/10 - results [facility] noted. Talked about control risk factors. Pt says recent change in home DM meds and says BS worse since. Adjust insulin. Increase activity. Doing well home soon.  Meds change to home doses. Prior on CPAP but lost machine three years ago. Prior had seen Dr Soria in [place]. Can get f/u as outpt.  05/11--Pt doing well, voiced that she feels good enough to "go home" today.  Blood glucose readings have improved and ranged from 117-127 this AM.  Dose of 70/30 insulin was not given this AM.  Novolin N 14 units ordered this AM and blood sugar to be rechecked prior to discharge. Home Insulin adjusted during this admission and pt will d/c on increased dose.  Can restart Metformin tomorrow.  Pt monitors glucose QID at home and also has a sliding scale.  Is aware of goals/parameters for her blood glucose and when to hold medications.  Pt to continuing monitoring blood glucose at home, maintain a log, and discuss readings with PCP at hospital follow up visit.  Discussed risk factor reduction at length with pt this AM.  Pt was admitted for NSTEMI, underwent LHC notable for three vessel CAD; successful bifurcation PCI of LCx/OM2 with IZABELA.  Recs are for DAPT x's 1 year, medical management, and risk factor modification.  Pt will need to follow up with cardiology outpatient.  Pt has history of RAUL, however, has not had CPAP in 3 years, will have patient to follow up with sleep study outpatient.  Also states has dental caries and just completed a round of ABT piror to admission.  Instructed on the importance of not stopping blood thinners without cardiology approval. Pt will follow up with Dentist at [facility] outpatient and will discuss this at her hosp follow up visit with cardiology.  Pt also admits to not having an eye exam x's 2-3 years, strongly encouraged to schedule eye exam ASAP and to have this done annually. Pt to follow up with PCP in 1 week; instructed on goals/parameters for her blood pressure " and encouraged her to obtain a BP cuff to monitor blood pressure and heart rate at home.  Maintain a log of BP & HR readings and discuss these with PCP.  Has met maximum benefit from this hospitalization and is stable for discharge.

## 2022-05-11 NOTE — SUBJECTIVE & OBJECTIVE
Interval History:     Review of Systems   Constitutional:  Negative for appetite change, fatigue and fever.   HENT:  Negative for congestion, hearing loss and trouble swallowing.    Respiratory:  Positive for shortness of breath. Negative for chest tightness and wheezing.    Cardiovascular:  Positive for chest pain. Negative for palpitations.   Gastrointestinal:  Negative for abdominal pain, constipation and nausea.   Genitourinary:  Negative for difficulty urinating and dysuria.   Musculoskeletal:  Negative for back pain and neck stiffness.   Skin:  Negative for pallor and rash.   Neurological:  Negative for dizziness, speech difficulty and headaches.   Psychiatric/Behavioral:  Positive for sleep disturbance. Negative for confusion and suicidal ideas.    Objective:     Vital Signs (Most Recent):  Temp: 97.8 °F (36.6 °C) (05/10/22 2000)  Pulse: 82 (05/10/22 2000)  Resp: 16 (05/10/22 2000)  BP: 99/63 (05/10/22 2000)  SpO2: 96 % (05/10/22 2000) Vital Signs (24h Range):  Temp:  [97 °F (36.1 °C)-97.9 °F (36.6 °C)] 97.8 °F (36.6 °C)  Pulse:  [82-98] 82  Resp:  [16-18] 16  SpO2:  [95 %-97 %] 96 %  BP: ()/(63-94) 99/63     Weight: 81.6 kg (180 lb)  Body mass index is 32.92 kg/m².  No intake or output data in the 24 hours ending 05/10/22 2325   Physical Exam    Significant Labs: All pertinent labs within the past 24 hours have been reviewed.  BMP:   Recent Labs   Lab 05/09/22  1702 05/10/22  0455   * 388*   * 133*   K 3.8 4.5   CL 97* 97*   CO2 25 22   BUN 10 15   CREATININE 0.89 0.95   CALCIUM 9.1 8.9   MG 1.7  --      CBC:   Recent Labs   Lab 05/09/22  1702 05/10/22  0455   WBC 12.17* 11.72*   HGB 15.1 14.6   HCT 43.8 42.5    286     CMP:   Recent Labs   Lab 05/09/22  1702 05/10/22  0455   * 133*   K 3.8 4.5   CL 97* 97*   CO2 25 22   * 388*   BUN 10 15   CREATININE 0.89 0.95   CALCIUM 9.1 8.9   PROT 7.0  --    ALBUMIN 3.5  --    BILITOT 0.4  --    ALKPHOS 95  --    AST 10*  --     ALT 22  --    ANIONGAP 15 19*   EGFRNONAA 74 68       Significant Imaging: I have reviewed all pertinent imaging results/findings within the past 24 hours.    Imaging Results              X-Ray Chest PA And Lateral (Final result)  Result time 05/09/22 17:20:24      Final result by Davian Diego DO (05/09/22 17:20:24)                   Impression:      As above.      Electronically signed by: Davian Dieog  Date:    05/09/2022  Time:    17:20               Narrative:    EXAMINATION:  XR CHEST PA AND LATERAL    CLINICAL HISTORY:  Chest Pain;    TECHNIQUE:  XR CHEST PA AND LATERAL    COMPARISON:  Comparisons were reviewed, if available.    FINDINGS:  No lines or tubes.    Lungs are clear.    Normal pleura.    Cardiac silhouette is similar to comparison exam.    No new acute bone findings.                                    Intake/Output - Last 3 Shifts         05/09 0700  05/10 0659 05/10 0700  05/11 0659          Urine Occurrence 2 x     Stool Occurrence 0 x           Microbiology Results (last 7 days)       ** No results found for the last 168 hours. **

## 2022-05-17 ENCOUNTER — TELEPHONE (OUTPATIENT)
Dept: PAIN MEDICINE | Facility: CLINIC | Age: 42
End: 2022-05-17
Payer: MEDICARE

## 2022-05-17 NOTE — TELEPHONE ENCOUNTER
----- Message from Dhara Peñaloza sent at 5/16/2022  2:58 PM CDT -----  Regarding: BONIFACIO Ramirez went for RA appt and had a heart attack.  She was hospitalized M-W.  She's currently doing ok.  Please call [phone].

## 2022-05-27 NOTE — PROGRESS NOTES
PCP: King Griffin MD    Referring Provider:     Subjective:   Cherry Smiley is a 41 y.o. female with hx of htn, diabetes, HLD, chronic pain syndrome, opioid dependence,fibromyalgia,m who presents following hospitalization, NSTEMI.      5/31/22 - Patient states she has fatigue and weakness with walking.  Has walker for ambulation.  Has home health and cardiac therapy.  She had one episode of chest pain associated with stress.  She reports shortness of breath with walking.  She brought in blood pressure log which are controlled.   Pt had two stents placed in her Lcx.  She has triple vessel disease    Fhx:  Family history of heart disease.   Shx: Smoker     EKG 5/11/22 - Sinus rhythm  with PVCs.  Short VT interval.                           Poor R wave progression.  Inferior/lateral ST-T abnormality  may be due to myocardial ischemia.                             Low QRS voltages in precordial leads   ECHO 5/10/22 -  The left ventricle is normal in size with normal systolic function.  EF 60%.                                Normal right ventricular size with normal right ventricular systolic function.                                 Mild tricuspid regurgitation.  The estimated PA systolic pressure is 37 mmHg.  [facility] 5/9/22 -    1.  Three vessel coronary artery disease ( 70% pLAD - iFR 0.78,  99% thrombotic occlusion of OM2, and 70% RCA)  2. Slightly elevated left sided filling (LVEDP = 15mmHg)  3. S/P successful bifurcation PCI of LCx/OM2 with IZABELA (ZOILA Vazquez)  Medical management for residual disease; if persistent anginal symptoms will refer for CABG       Lab Results   Component Value Date     05/11/2022    K 3.5 05/11/2022     05/11/2022    CO2 26 05/11/2022    BUN 22 (H) 05/11/2022    CREATININE 0.89 05/11/2022    CALCIUM 8.5 05/11/2022    ANIONGAP 13 05/11/2022    EGFRNONAA 74 05/11/2022       Lab Results   Component Value Date    CHOL 161 05/11/2022     Lab Results   Component Value Date    HDL 31 (L)  "05/11/2022     Lab Results   Component Value Date    LDLCALC 105 05/11/2022     Lab Results   Component Value Date    TRIG 126 05/11/2022     Lab Results   Component Value Date    CHOLHDL 5.2 05/11/2022       Lab Results   Component Value Date    WBC 14.54 (H) 05/11/2022    HGB 12.3 05/11/2022    HCT 35.8 (L) 05/11/2022    MCV 93.2 05/11/2022     05/11/2022           Review of Systems   Respiratory: Positive for shortness of breath. Negative for cough.    Cardiovascular: Positive for leg swelling. Negative for chest pain, palpitations, orthopnea, claudication and PND.   Neurological: Positive for loss of consciousness.         Objective:   /80   Pulse 74   Resp 18   Ht 5' 2" (1.575 m)   Wt 83 kg (183 lb)   BMI 33.47 kg/m²     Physical Exam  Constitutional:       General: She is not in acute distress.  Eyes:      Extraocular Movements: Extraocular movements intact.   Cardiovascular:      Rate and Rhythm: Normal rate and regular rhythm.      Pulses: Normal pulses.      Heart sounds: Normal heart sounds. No murmur heard.  Pulmonary:      Effort: Pulmonary effort is normal.      Breath sounds: Normal breath sounds.   Abdominal:      Palpations: Abdomen is soft.   Musculoskeletal:         General: No swelling.      Cervical back: Neck supple.   Skin:     Findings: No rash.   Neurological:      Mental Status: She is alert and oriented to person, place, and time.           Assessment:     1. Pain in both lower extremities  US Lower Extrem Arteries Bilat with NARDA (xpd)   2. Type 2 diabetes mellitus with other specified complication, unspecified whether long term insulin use  Hemoglobin A1C   3. Coronary artery disease involving native coronary artery of native heart with angina pectoris           Plan:   Coronary artery disease involving native coronary artery of native heart with angina pectoris  S/p Recent NSTEMI   S/p C with 3VCAD with PCI of LCx/OM2   Residual LAD and RCA disease; plan for CABG after " 3-6 months of PCI  Continue aspirin and Brillinta  Increase metop to 50mg qd and continue lisinopril 10mg qd  Has home rehab set up      Type 2 diabetes mellitus  On insulin and farxiga  Check A1c - if uncontrolled will refer to [facility] Mullen

## 2022-05-31 ENCOUNTER — OFFICE VISIT (OUTPATIENT)
Dept: CARDIOLOGY | Facility: CLINIC | Age: 42
End: 2022-05-31
Payer: MEDICARE

## 2022-05-31 VITALS
WEIGHT: 183 LBS | HEART RATE: 74 BPM | BODY MASS INDEX: 33.68 KG/M2 | SYSTOLIC BLOOD PRESSURE: 118 MMHG | DIASTOLIC BLOOD PRESSURE: 80 MMHG | RESPIRATION RATE: 18 BRPM | HEIGHT: 62 IN

## 2022-05-31 DIAGNOSIS — E11.69 TYPE 2 DIABETES MELLITUS WITH OTHER SPECIFIED COMPLICATION, UNSPECIFIED WHETHER LONG TERM INSULIN USE: ICD-10-CM

## 2022-05-31 DIAGNOSIS — M79.604 PAIN IN BOTH LOWER EXTREMITIES: Primary | ICD-10-CM

## 2022-05-31 DIAGNOSIS — I25.119 CORONARY ARTERY DISEASE INVOLVING NATIVE CORONARY ARTERY OF NATIVE HEART WITH ANGINA PECTORIS: ICD-10-CM

## 2022-05-31 DIAGNOSIS — M79.605 PAIN IN BOTH LOWER EXTREMITIES: Primary | ICD-10-CM

## 2022-05-31 PROCEDURE — 99214 PR OFFICE/OUTPT VISIT, EST, LEVL IV, 30-39 MIN: ICD-10-PCS | Mod: S$PBB,,, | Performed by: STUDENT IN AN ORGANIZED HEALTH CARE EDUCATION/TRAINING PROGRAM

## 2022-05-31 PROCEDURE — 99214 OFFICE O/P EST MOD 30 MIN: CPT | Mod: PBBFAC | Performed by: STUDENT IN AN ORGANIZED HEALTH CARE EDUCATION/TRAINING PROGRAM

## 2022-05-31 PROCEDURE — 99214 OFFICE O/P EST MOD 30 MIN: CPT | Mod: S$PBB,,, | Performed by: STUDENT IN AN ORGANIZED HEALTH CARE EDUCATION/TRAINING PROGRAM

## 2022-05-31 RX ORDER — LISINOPRIL 10 MG/1
10 TABLET ORAL DAILY
Qty: 90 TABLET | Refills: 3 | Status: SHIPPED | OUTPATIENT
Start: 2022-05-31 | End: 2023-10-03

## 2022-05-31 RX ORDER — METOPROLOL SUCCINATE 50 MG/1
50 TABLET, EXTENDED RELEASE ORAL DAILY
Qty: 90 TABLET | Refills: 3 | Status: SHIPPED | OUTPATIENT
Start: 2022-05-31 | End: 2022-06-30 | Stop reason: SDUPTHER

## 2022-05-31 NOTE — PATIENT INSTRUCTIONS
Increase metoprolol to 50 mg   Refill lisinopril 10 mg   Refill Brilinta 90 mg, twice daily   Arterial duplex with ABIs.   Blood work - A1C

## 2022-05-31 NOTE — ASSESSMENT & PLAN NOTE
S/p Recent NSTEMI   S/p [facility] with 3VCAD with PCI of LCx/OM2   Residual LAD and RCA disease; plan for CABG after 3-6 months of PCI  Continue aspirin and Brillinta  Increase metop to 50mg qd and continue lisinopril 10mg qd  Has home rehab set up

## 2022-06-01 DIAGNOSIS — E11.9 DIABETES MELLITUS WITHOUT COMPLICATION: Primary | ICD-10-CM

## 2022-06-06 ENCOUNTER — HOSPITAL ENCOUNTER (OUTPATIENT)
Dept: RADIOLOGY | Facility: HOSPITAL | Age: 42
Discharge: HOME | End: 2022-06-06
Attending: STUDENT IN AN ORGANIZED HEALTH CARE EDUCATION/TRAINING PROGRAM
Payer: MEDICARE

## 2022-06-06 DIAGNOSIS — M79.605 PAIN IN BOTH LOWER EXTREMITIES: ICD-10-CM

## 2022-06-06 DIAGNOSIS — M79.604 PAIN IN BOTH LOWER EXTREMITIES: ICD-10-CM

## 2022-06-06 PROCEDURE — 93925 US ARTERIAL LOWER EXTREMITY BILAT WITH ABI (XPD): ICD-10-PCS | Mod: 26,,, | Performed by: RADIOLOGY

## 2022-06-06 PROCEDURE — 93925 LOWER EXTREMITY STUDY: CPT | Mod: TC

## 2022-06-06 PROCEDURE — 93922 US ARTERIAL LOWER EXTREMITY BILAT WITH ABI (XPD): ICD-10-PCS | Mod: 26,,, | Performed by: RADIOLOGY

## 2022-06-06 PROCEDURE — 93925 LOWER EXTREMITY STUDY: CPT | Mod: 26,,, | Performed by: RADIOLOGY

## 2022-06-06 PROCEDURE — 93922 UPR/L XTREMITY ART 2 LEVELS: CPT | Mod: 26,,, | Performed by: RADIOLOGY

## 2022-06-08 RX ORDER — ISOSORBIDE MONONITRATE 30 MG/1
30 TABLET, EXTENDED RELEASE ORAL DAILY
COMMUNITY
End: 2022-06-08 | Stop reason: SDUPTHER

## 2022-06-08 NOTE — PROGRESS NOTES
"Pt. Notified u/s of the legs does not suggest significant blockages in the arteries of the legs per Dr. Dewey. Pt. Voiced understanding. Pt. C/o "chest tingling," left arm pain, SOB. D/W pt. Will send in imdur 30mg by mouth daily, increase metoprolol 100mg po daily call in 1 week to give update f/u in 1 month per Dr. Dewey. Pt. Voiced understanding. Advised pt.to go to ER if needed for increased chest pain, sob or for any other problems or concerns. Pt. Voiced understanding."

## 2022-06-09 RX ORDER — ISOSORBIDE MONONITRATE 30 MG/1
30 TABLET, EXTENDED RELEASE ORAL DAILY
Qty: 30 TABLET | Refills: 4 | Status: SHIPPED | OUTPATIENT
Start: 2022-06-09 | End: 2022-09-01 | Stop reason: SDUPTHER

## 2022-06-13 ENCOUNTER — OFFICE VISIT (OUTPATIENT)
Dept: PAIN MEDICINE | Facility: CLINIC | Age: 42
End: 2022-06-13
Payer: MEDICARE

## 2022-06-13 VITALS
HEART RATE: 80 BPM | DIASTOLIC BLOOD PRESSURE: 45 MMHG | SYSTOLIC BLOOD PRESSURE: 91 MMHG | HEIGHT: 62 IN | OXYGEN SATURATION: 98 % | WEIGHT: 183 LBS | BODY MASS INDEX: 33.68 KG/M2 | RESPIRATION RATE: 17 BRPM

## 2022-06-13 DIAGNOSIS — M47.812 SPONDYLOSIS WITHOUT MYELOPATHY OR RADICULOPATHY, CERVICAL REGION: Chronic | ICD-10-CM

## 2022-06-13 DIAGNOSIS — M25.512 ACUTE PAIN OF LEFT SHOULDER: ICD-10-CM

## 2022-06-13 DIAGNOSIS — M45.0 ANKYLOSING SPONDYLITIS OF MULTIPLE SITES IN SPINE: ICD-10-CM

## 2022-06-13 DIAGNOSIS — Z79.899 ENCOUNTER FOR LONG-TERM (CURRENT) USE OF OTHER MEDICATIONS: ICD-10-CM

## 2022-06-13 DIAGNOSIS — M05.79 RHEUMATOID ARTHRITIS INVOLVING MULTIPLE SITES WITH POSITIVE RHEUMATOID FACTOR: Primary | Chronic | ICD-10-CM

## 2022-06-13 PROCEDURE — 99214 PR OFFICE/OUTPT VISIT, EST, LEVL IV, 30-39 MIN: ICD-10-PCS | Mod: S$PBB,,, | Performed by: PHYSICIAN ASSISTANT

## 2022-06-13 PROCEDURE — 80305 DRUG TEST PRSMV DIR OPT OBS: CPT | Mod: PBBFAC | Performed by: PHYSICIAN ASSISTANT

## 2022-06-13 PROCEDURE — 99214 OFFICE O/P EST MOD 30 MIN: CPT | Mod: S$PBB,,, | Performed by: PHYSICIAN ASSISTANT

## 2022-06-13 PROCEDURE — 99214 OFFICE O/P EST MOD 30 MIN: CPT | Mod: PBBFAC | Performed by: PHYSICIAN ASSISTANT

## 2022-06-13 RX ORDER — GABAPENTIN 300 MG/1
300 CAPSULE ORAL EVERY 8 HOURS
Qty: 90 CAPSULE | Refills: 1 | Status: SHIPPED | OUTPATIENT
Start: 2022-06-13 | End: 2022-08-04 | Stop reason: SDUPTHER

## 2022-06-13 RX ORDER — OXYCODONE AND ACETAMINOPHEN 7.5; 325 MG/1; MG/1
1 TABLET ORAL EVERY 8 HOURS
Qty: 90 TABLET | Refills: 0 | Status: SHIPPED | OUTPATIENT
Start: 2022-07-14 | End: 2022-08-04 | Stop reason: SDUPTHER

## 2022-06-13 RX ORDER — OXYCODONE AND ACETAMINOPHEN 7.5; 325 MG/1; MG/1
1 TABLET ORAL EVERY 8 HOURS
Qty: 90 TABLET | Refills: 0 | Status: SHIPPED | OUTPATIENT
Start: 2022-06-14 | End: 2022-07-14

## 2022-06-13 NOTE — PROGRESS NOTES
Subjective:      Patient ID: Cherry Smiley is a 41 y.o. female.    Chief Complaint: Hip Pain and Headache      Pain  This is a chronic problem. The current episode started more than 1 year ago. The problem occurs daily. The problem has been waxing and waning. Associated symptoms include arthralgias. Pertinent negatives include no change in bowel habit, chest pain, chills, coughing, fatigue, fever, rash, sore throat, vertigo or vomiting.     Review of Systems   Constitutional: Negative for appetite change, chills, fatigue, fever and unexpected weight change.   HENT: Negative for drooling, ear discharge, facial swelling, nosebleeds, sore throat, trouble swallowing, voice change and goiter.    Eyes: Negative for photophobia, pain, discharge, redness and visual disturbance.   Respiratory: Negative for apnea, cough, choking, chest tightness, shortness of breath, wheezing and stridor.    Cardiovascular: Negative for chest pain, palpitations and leg swelling.   Gastrointestinal: Negative for abdominal distention, change in bowel habit, diarrhea, rectal pain, vomiting, fecal incontinence and change in bowel habit.   Endocrine: Negative for cold intolerance, heat intolerance, polydipsia, polyphagia and polyuria.   Genitourinary: Negative for bladder incontinence, dysuria, flank pain, frequency and hot flashes.   Musculoskeletal: Positive for arthralgias, back pain, leg pain and neck stiffness.   Integumentary:  Negative for color change, pallor and rash.   Allergic/Immunologic: Negative for immunocompromised state.   Neurological: Negative for dizziness, vertigo, seizures, syncope, facial asymmetry, speech difficulty, light-headedness, disturbances in coordination, memory loss and coordination difficulties.   Hematological: Negative for adenopathy. Does not bruise/bleed easily.   Psychiatric/Behavioral: Negative for agitation, behavioral problems, confusion, decreased concentration, dysphoric mood, hallucinations,  "self-injury and suicidal ideas. The patient is not nervous/anxious and is not hyperactive.             Objective:  Vitals:    06/13/22 0939 06/13/22 0940   BP: (!) 91/45    Pulse: 80    Resp: 17    SpO2: 98%    Weight: 83 kg (183 lb)    Height: 5' 2" (1.575 m)    PainSc:   6   5         Physical Exam  Vitals and nursing note reviewed. Exam conducted with a chaperone present.   Constitutional:       General: She is awake.      Appearance: Normal appearance. She is not ill-appearing, toxic-appearing or diaphoretic.   HENT:      Head: Normocephalic and atraumatic.      Nose: Nose normal.      Mouth/Throat:      Mouth: Mucous membranes are moist.      Pharynx: Oropharynx is clear.   Eyes:      Conjunctiva/sclera: Conjunctivae normal.      Pupils: Pupils are equal, round, and reactive to light.   Cardiovascular:      Rate and Rhythm: Normal rate.   Pulmonary:      Effort: Pulmonary effort is normal. No respiratory distress.   Abdominal:      Palpations: Abdomen is soft.   Musculoskeletal:         General: Normal range of motion.      Right shoulder: Tenderness present.      Left shoulder: Tenderness present.      Right wrist: Tenderness present.      Left wrist: Tenderness present.      Cervical back: Normal range of motion and neck supple. Tenderness present.      Thoracic back: Tenderness present.      Lumbar back: Tenderness present.   Skin:     General: Skin is warm and dry.   Neurological:      General: No focal deficit present.      Mental Status: She is alert and oriented to person, place, and time. Mental status is at baseline.      Cranial Nerves: Cranial nerves are intact. No cranial nerve deficit (II-XII).   Psychiatric:         Mood and Affect: Mood normal.         Behavior: Behavior normal. Behavior is cooperative.         Thought Content: Thought content normal.           US Lower Extrem Arteries Bilat with NARDA (xpd)  Narrative: EXAMINATION:  US ARTERIAL LOWER EXTREMITY BILAT WITH NARDA (XPD)    CLINICAL " HISTORY:  Pain in right leg    TECHNIQUE:  Ankle-brachial indices were calculated following measurement of the brachial systolic as well as the posterior tibial and dorsalis pedis systolic pressures.  Additionally, real-time ultrasound as well as Doppler spectral waveform analysis and color flow imaging was performed of the large vessels of both lower extremities.    COMPARISON:  None.    FINDINGS:  Ankle brachial index measures 1.07 on the right and 1.04 on the left.  No vascular occlusions identified.  There is some spectral broadening seen of the mid legs with biphasic waveform seen more distally.  Mild atherosclerotic changes are seen on grayscale images.  Impression: Mild atherosclerotic disease bilaterally.    Electronically signed by: King Boone  Date:    06/06/2022  Time:    11:05       Lab Visit on 05/31/2022   Component Date Value Ref Range Status    Hemoglobin A1C 05/31/2022 8.9 (A) 4.5 - 6.6 % Final    Estimated Average Glucose 05/31/2022 210  mg/dL Final   No results displayed because visit has over 200 results.      Lab Visit on 04/07/2022   Component Date Value Ref Range Status    Nil Result, TB 04/07/2022 0.00   Final    TB1 Ag minus Nil Result 04/07/2022 0.02   Final    TB2 Ag minus Nil Result 04/07/2022 0.04   Final    Mitogen minus Nil Result, TB 04/07/2022 >10.00   Final    QuantiFERON-Tb Gold Plus 04/07/2022 Negative  Negative Final   Lab Visit on 04/07/2022   Component Date Value Ref Range Status    Hepatitis C Ab 04/07/2022 Reactive (A) Non-Reactive Final    Hepatitis B Surface Ag 04/07/2022 Non-Reactive  Non-Reactive Final    Hepatitis B Surface Ab 04/07/2022 Non-Reactive  Non-Reactive Final    CCP 04/07/2022 <16.0  <=19.9 units Final    RA Titer 04/07/2022 <10  0 - 15 IU/mL Final    CRP 04/07/2022 1.13 (A) 0.00 - 0.80 mg/dL Final    ESR Westergren 04/07/2022 12  0 - 20 mm/Hr Final    HLA-B27 Result 04/07/2022 Negative  Not Applicable Final    Interpretation 04/07/2022 SEE  COMMENTS   Final    HCV RNA Detect/Quant 04/07/2022 Undetected  Undetected IU/mL Final   Office Visit on 02/10/2022   Component Date Value Ref Range Status    POC Amphetamines 02/10/2022 Negative  Negative, Inconclusive Final    POC Barbiturates 02/10/2022 Negative  Negative, Inconclusive Final    POC Benzodiazepines 02/10/2022 Negative  Negative, Inconclusive Final    POC Cocaine 02/10/2022 Negative  Negative, Inconclusive Final    POC THC 02/10/2022 Negative  Negative, Inconclusive Final    POC Methadone 02/10/2022 Negative  Negative, Inconclusive Final    POC Methamphetamine 02/10/2022 Negative  Negative, Inconclusive Final    POC Opiates 02/10/2022 Negative  Negative, Inconclusive Final    POC Oxycodone 02/10/2022 Presumptive Positive (A) Negative, Inconclusive Final    POC Phencyclidine 02/10/2022 Negative  Negative, Inconclusive Final    POC Methylenedioxymethamphetamine * 02/10/2022 Negative  Negative, Inconclusive Final    POC Tricyclic Antidepressants 02/10/2022 Negative  Negative, Inconclusive Final    POC Buprenorphine 02/10/2022 Negative   Final     Acceptable 02/10/2022 Yes   Final    POC Temperature (Urine) 02/10/2022 94   Final           Assessment:      1. Rheumatoid arthritis involving multiple sites with positive rheumatoid factor    2. Ankylosing spondylitis of multiple sites in spine    3. Acute pain of left shoulder    4. Spondylosis without myelopathy or radiculopathy, cervical region    5. Encounter for long-term (current) use of other medications          Orders Placed This Encounter   Procedures    POCT Urine Drug Screen Presump     Interpretive Information:     Negative:  No drug detected at the cut off level.   Positive:  This result represents presumptive positive for the   tested drug, other substances may yield a positive response other   than the analyte of interest. This result should be utilized for   diagnostic purpose only. Confirmation testing will  be performed upon physician request only.            A's of Opioid Risk Assessment  Activity:Patient can perform ADL.   Analgesia:Patients pain is partially controlled by current medication. Patient has tried OTC medications such as Tylenol and Ibuprofen with out relief.   Adverse Effects: Patient denies constipation or sedation.  Aberrant Behavior:  reviewed with no aberrant drug seeking/taking behavior.  Overdose reversal drug naloxone discussed    Drug screen reviewed      Requested Prescriptions     Signed Prescriptions Disp Refills    gabapentin (NEURONTIN) 300 MG capsule 90 capsule 1     Sig: Take 1 capsule (300 mg total) by mouth every 8 (eight) hours.    oxyCODONE-acetaminophen (PERCOCET) 7.5-325 mg per tablet 90 tablet 0     Sig: Take 1 tablet by mouth every 8 (eight) hours.    oxyCODONE-acetaminophen (PERCOCET) 7.5-325 mg per tablet 90 tablet 0     Sig: Take 1 tablet by mouth every 8 (eight) hours.         Plan:    Follows rheumatology [facility] rheumatoid arthritis, ankylosing spondylitis    Recovering after myocardial infarction following  Cardiology [facility]    After oral pain has resolved will decrease Percocet 7.51 p.o. q.12 hours    She states she would like to continue conservative management from our standpoint    Continue home exercise program as directed    Continue current medication    Follow-up 2 months discuss resuming Percocet 5    Dr. Venegas, September 2022    Bring original prescription medication bottles/container/box with labels to each visit

## 2022-06-29 NOTE — PROGRESS NOTES
PCP: King Griffin MD    Referring Provider:     Subjective:   Cherry Smiley is a 41 y.o. female with hx of htn, diabetes, HLD, chronic pain syndrome, opioid dependence,fibromyalgia,m who presents for follow up.     6/30/22 - Patient states she still has some chest pain and heart flutters.  She is able to do most of her daily activities.  She is working in garden without significant chest pain, has heart fluttering.  She reports fatigue with activity.       5/31/22 - Patient states she has fatigue and weakness with walking.  Has walker for ambulation.  Has home health and cardiac therapy.  She had one episode of chest pain associated with stress.  She reports shortness of breath with walking.  She brought in blood pressure log which are controlled.   Pt had two stents placed in her Lcx.  She has triple vessel disease    Fhx:  Family history of heart disease.   Shx: Smoker     EKG 5/11/22 - Sinus rhythm  with PVCs.  Short NE interval.                           Poor R wave progression.  Inferior/lateral ST-T abnormality  may be due to myocardial ischemia.                             Low QRS voltages in precordial leads   ECHO 5/10/22 -  The left ventricle is normal in size with normal systolic function.  EF 60%.                                Normal right ventricular size with normal right ventricular systolic function.                                 Mild tricuspid regurgitation.  The estimated PA systolic pressure is 37 mmHg.  [facility] 5/9/22 -    1.  Three vessel coronary artery disease ( 70% pLAD - iFR 0.78,  99% thrombotic occlusion of OM2, and 70% RCA)  2.  Slightly elevated left sided filling (LVEDP = 15mmHg)  3.  S/P successful bifurcation PCI of LCx/OM2 with IZABELA (ZOILA LopezK)  Medical management for residual disease; if persistent anginal symptoms will refer for CABG      Arterial duplex 6/6/22 - Mild atherosclerotic disease bilaterally.     Lab Results   Component Value Date     05/11/2022    K 3.5 05/11/2022  "    05/11/2022    CO2 26 05/11/2022    BUN 22 (H) 05/11/2022    CREATININE 0.89 05/11/2022    CALCIUM 8.5 05/11/2022    ANIONGAP 13 05/11/2022    EGFRNONAA 74 05/11/2022       Lab Results   Component Value Date    CHOL 161 05/11/2022     Lab Results   Component Value Date    HDL 31 (L) 05/11/2022     Lab Results   Component Value Date    LDLCALC 105 05/11/2022     Lab Results   Component Value Date    TRIG 126 05/11/2022     Lab Results   Component Value Date    CHOLHDL 5.2 05/11/2022       Lab Results   Component Value Date    WBC 14.54 (H) 05/11/2022    HGB 12.3 05/11/2022    HCT 35.8 (L) 05/11/2022    MCV 93.2 05/11/2022     05/11/2022           Review of Systems   Respiratory: Positive for shortness of breath. Negative for cough.    Cardiovascular: Positive for leg swelling. Negative for chest pain, palpitations, orthopnea, claudication and PND.   Neurological: Positive for loss of consciousness.         Objective:   /76   Pulse 79   Resp 18   Ht 5' 2" (1.575 m)   Wt 82.6 kg (182 lb)   BMI 33.29 kg/m²     Physical Exam  Constitutional:       General: She is not in acute distress.  Eyes:      Extraocular Movements: Extraocular movements intact.   Cardiovascular:      Rate and Rhythm: Normal rate and regular rhythm.      Pulses: Normal pulses.      Heart sounds: Normal heart sounds. No murmur heard.  Pulmonary:      Effort: Pulmonary effort is normal.      Breath sounds: Normal breath sounds.   Abdominal:      Palpations: Abdomen is soft.   Musculoskeletal:         General: No swelling.      Cervical back: Neck supple.   Skin:     Findings: No rash.   Neurological:      Mental Status: She is alert and oriented to person, place, and time.           Assessment:     1. Coronary artery disease involving native coronary artery of native heart with angina pectoris     2. Type 2 diabetes mellitus with hyperglycemia, with long-term current use of insulin     3. Smoker           Plan:   Coronary " artery disease involving native coronary artery of native heart with angina pectoris  S/p Recent NSTEMI   S/p C with 3VCAD with PCI of LCx/OM2   Residual LAD and RCA disease (LAD - iFR 0.78 and RCA iFR 0.8); Refer to Dr. Espinoza for CABG (LIMA-LAD and RCA) - patient can interrupt brillinta therapy earliest in 8/2022  Continue aspirin and Brillinta  Increase metoprolol to 200mg qd and lisinopril 10mg qd        Type 2 diabetes mellitus  On insulin and farxiga  A1c 8.9 - refer to [facility] Mullen    Smoker  Down to 2-3 cig/day  Counselling done

## 2022-06-30 ENCOUNTER — PATIENT MESSAGE (OUTPATIENT)
Dept: PAIN MEDICINE | Facility: CLINIC | Age: 42
End: 2022-06-30
Payer: MEDICARE

## 2022-06-30 ENCOUNTER — OFFICE VISIT (OUTPATIENT)
Dept: CARDIOLOGY | Facility: CLINIC | Age: 42
End: 2022-06-30
Payer: MEDICARE

## 2022-06-30 VITALS
HEART RATE: 79 BPM | DIASTOLIC BLOOD PRESSURE: 76 MMHG | RESPIRATION RATE: 18 BRPM | WEIGHT: 182 LBS | HEIGHT: 62 IN | SYSTOLIC BLOOD PRESSURE: 116 MMHG | BODY MASS INDEX: 33.49 KG/M2

## 2022-06-30 DIAGNOSIS — E11.65 TYPE 2 DIABETES MELLITUS WITH HYPERGLYCEMIA, WITH LONG-TERM CURRENT USE OF INSULIN: ICD-10-CM

## 2022-06-30 DIAGNOSIS — I25.119 CORONARY ARTERY DISEASE INVOLVING NATIVE CORONARY ARTERY OF NATIVE HEART WITH ANGINA PECTORIS: ICD-10-CM

## 2022-06-30 DIAGNOSIS — Z79.4 TYPE 2 DIABETES MELLITUS WITH HYPERGLYCEMIA, WITH LONG-TERM CURRENT USE OF INSULIN: ICD-10-CM

## 2022-06-30 DIAGNOSIS — F17.200 SMOKER: ICD-10-CM

## 2022-06-30 PROCEDURE — 99214 OFFICE O/P EST MOD 30 MIN: CPT | Mod: ,,, | Performed by: STUDENT IN AN ORGANIZED HEALTH CARE EDUCATION/TRAINING PROGRAM

## 2022-06-30 PROCEDURE — 99214 PR OFFICE/OUTPT VISIT, EST, LEVL IV, 30-39 MIN: ICD-10-PCS | Mod: ,,, | Performed by: STUDENT IN AN ORGANIZED HEALTH CARE EDUCATION/TRAINING PROGRAM

## 2022-06-30 RX ORDER — METOPROLOL SUCCINATE 200 MG/1
200 TABLET, EXTENDED RELEASE ORAL DAILY
Qty: 90 TABLET | Refills: 3 | Status: SHIPPED | OUTPATIENT
Start: 2022-06-30 | End: 2023-06-27

## 2022-06-30 NOTE — ASSESSMENT & PLAN NOTE
S/p Recent NSTEMI   S/p [facility] with 3VCAD with PCI of LCx/OM2   Residual LAD and RCA disease (LAD - iFR 0.78 and RCA iFR 0.8); Refer to Dr. Espinoza for CABG (LIMA-LAD and RCA) - patient can interrupt brillinta therapy earliest in 8/2022  Continue aspirin and Brillinta  Increase metoprolol to 200mg qd and lisinopril 10mg qd

## 2022-07-12 ENCOUNTER — HOSPITAL ENCOUNTER (EMERGENCY)
Facility: HOSPITAL | Age: 42
Discharge: HOME | End: 2022-07-12
Attending: FAMILY MEDICINE
Payer: MEDICARE

## 2022-07-12 VITALS
DIASTOLIC BLOOD PRESSURE: 82 MMHG | SYSTOLIC BLOOD PRESSURE: 123 MMHG | OXYGEN SATURATION: 95 % | HEIGHT: 62 IN | BODY MASS INDEX: 33.49 KG/M2 | HEART RATE: 78 BPM | TEMPERATURE: 98 F | RESPIRATION RATE: 21 BRPM | WEIGHT: 182 LBS

## 2022-07-12 DIAGNOSIS — R55 SYNCOPE: ICD-10-CM

## 2022-07-12 LAB
ALBUMIN SERPL BCP-MCNC: 3.6 G/DL (ref 3.5–5)
ALBUMIN/GLOB SERPL: 0.9 {RATIO}
ALP SERPL-CCNC: 100 U/L (ref 37–98)
ALT SERPL W P-5'-P-CCNC: 27 U/L (ref 13–56)
ANION GAP SERPL CALCULATED.3IONS-SCNC: 14 MMOL/L (ref 7–16)
AST SERPL W P-5'-P-CCNC: 10 U/L (ref 15–37)
BASOPHILS # BLD AUTO: 0.09 K/UL (ref 0–0.2)
BASOPHILS NFR BLD AUTO: 0.6 % (ref 0–1)
BILIRUB SERPL-MCNC: 0.3 MG/DL (ref 0–1.2)
BUN SERPL-MCNC: 9 MG/DL (ref 7–18)
BUN/CREAT SERPL: 9 (ref 6–20)
CALCIUM SERPL-MCNC: 8.9 MG/DL (ref 8.5–10.1)
CHLORIDE SERPL-SCNC: 100 MMOL/L (ref 98–107)
CO2 SERPL-SCNC: 26 MMOL/L (ref 21–32)
CREAT SERPL-MCNC: 1.05 MG/DL (ref 0.55–1.02)
DIFFERENTIAL METHOD BLD: ABNORMAL
EOSINOPHIL # BLD AUTO: 0.49 K/UL (ref 0–0.5)
EOSINOPHIL NFR BLD AUTO: 3.3 % (ref 1–4)
ERYTHROCYTE [DISTWIDTH] IN BLOOD BY AUTOMATED COUNT: 15.7 % (ref 11.5–14.5)
GLOBULIN SER-MCNC: 3.9 G/DL (ref 2–4)
GLUCOSE SERPL-MCNC: 186 MG/DL (ref 74–106)
HCT VFR BLD AUTO: 44.3 % (ref 38–47)
HGB BLD-MCNC: 15 G/DL (ref 12–16)
IMM GRANULOCYTES # BLD AUTO: 0.06 K/UL (ref 0–0.04)
IMM GRANULOCYTES NFR BLD: 0.4 % (ref 0–0.4)
LYMPHOCYTES # BLD AUTO: 3.54 K/UL (ref 1–4.8)
LYMPHOCYTES NFR BLD AUTO: 23.8 % (ref 27–41)
MCH RBC QN AUTO: 31.8 PG (ref 27–31)
MCHC RBC AUTO-ENTMCNC: 33.9 G/DL (ref 32–36)
MCV RBC AUTO: 93.9 FL (ref 80–96)
MONOCYTES # BLD AUTO: 0.83 K/UL (ref 0–0.8)
MONOCYTES NFR BLD AUTO: 5.6 % (ref 2–6)
MPC BLD CALC-MCNC: 9.3 FL (ref 9.4–12.4)
NEUTROPHILS # BLD AUTO: 9.89 K/UL (ref 1.8–7.7)
NEUTROPHILS NFR BLD AUTO: 66.3 % (ref 53–65)
NRBC # BLD AUTO: 0 X10E3/UL
NRBC, AUTO (.00): 0 %
NT-PROBNP SERPL-MCNC: 224 PG/ML (ref 1–125)
PLATELET # BLD AUTO: 350 K/UL (ref 150–400)
POTASSIUM SERPL-SCNC: 3.9 MMOL/L (ref 3.5–5.1)
PROT SERPL-MCNC: 7.5 G/DL (ref 6.4–8.2)
RBC # BLD AUTO: 4.72 M/UL (ref 4.2–5.4)
SODIUM SERPL-SCNC: 136 MMOL/L (ref 136–145)
TROPONIN I SERPL HS-MCNC: 10.7 PG/ML
TROPONIN I SERPL HS-MCNC: 13.2 PG/ML
WBC # BLD AUTO: 14.9 K/UL (ref 4.5–11)

## 2022-07-12 PROCEDURE — 85025 COMPLETE CBC W/AUTO DIFF WBC: CPT | Performed by: FAMILY MEDICINE

## 2022-07-12 PROCEDURE — 83880 ASSAY OF NATRIURETIC PEPTIDE: CPT | Performed by: FAMILY MEDICINE

## 2022-07-12 PROCEDURE — 93010 EKG 12-LEAD: ICD-10-PCS | Mod: ,,, | Performed by: HOSPITALIST

## 2022-07-12 PROCEDURE — 99285 EMERGENCY DEPT VISIT HI MDM: CPT | Mod: 25

## 2022-07-12 PROCEDURE — 84484 ASSAY OF TROPONIN QUANT: CPT | Mod: 91 | Performed by: FAMILY MEDICINE

## 2022-07-12 PROCEDURE — 80053 COMPREHEN METABOLIC PANEL: CPT | Performed by: FAMILY MEDICINE

## 2022-07-12 PROCEDURE — 93005 ELECTROCARDIOGRAM TRACING: CPT

## 2022-07-12 PROCEDURE — 93010 ELECTROCARDIOGRAM REPORT: CPT | Mod: ,,, | Performed by: HOSPITALIST

## 2022-07-12 PROCEDURE — 36415 COLL VENOUS BLD VENIPUNCTURE: CPT | Performed by: FAMILY MEDICINE

## 2022-07-12 PROCEDURE — 84484 ASSAY OF TROPONIN QUANT: CPT | Performed by: FAMILY MEDICINE

## 2022-07-12 PROCEDURE — 99283 EMERGENCY DEPT VISIT LOW MDM: CPT | Mod: ,,, | Performed by: EMERGENCY MEDICINE

## 2022-07-12 PROCEDURE — 99283 PR EMERGENCY DEPT VISIT,LEVEL III: ICD-10-PCS | Mod: ,,, | Performed by: EMERGENCY MEDICINE

## 2022-07-12 NOTE — ED PROVIDER NOTES
Encounter Date: 7/12/2022    SCRIBE #1 NOTE: I, Saida Gonzalez, am scribing for, and in the presence of,  Janice Falcon MD. I have scribed the entire note.       History     Chief Complaint   Patient presents with    Loss of Consciousness     Patient is a 41 y.o. female who presents to the emergency department complaining of a syncopal episode that occurred 14:30 today. Patient explains that she was in her living room when she began to experience heart palpations and collapsed to the ground. Patient explains that she has bad intermittent episodes of heart palpations x 1 week. Patient also reports a headache x 3 days and left shoulder and hip pain. She has had left shoulder surgery in the past. Patient reports a hx of seizure but states that today's episode did not feel similar, takes 750mh keppra/ 1 day. Patient takes metroporal and asprin daily. Patient is followed by Dr. Dewey, cardio, and has an upcoming appointment with [facility]'s cardiology department for a bypass surgery. Pateint is a diabetic, normal blood sugar levels noted before and post syncopal episode. No other symptoms were reported.     The history is provided by the patient. No  was used.     Review of patient's allergies indicates:   Allergen Reactions    Cinnamon analogues Anaphylaxis    Iodine Anaphylaxis    Morpholine analogues Shortness Of Breath    Shellfish containing products Anaphylaxis    Pamelor [nortriptyline]      Past Medical History:   Diagnosis Date    Allergy     Anxiety     Congenital hypothyroidism     Depressive disorder     GERD (gastroesophageal reflux disease)     Hyperlipidemia     Hypertension     Insomnia     Irritable bowel syndrome     Lumbosacral spondylosis     NSTEMI (non-ST elevated myocardial infarction) 05/09/2022    Primary fibromyalgia syndrome     Seizures     Type 2 diabetes mellitus      Past Surgical History:   Procedure Laterality Date    ADENOIDECTOMY       ANGIOGRAM, CORONARY, WITH LEFT HEART CATHETERIZATION N/A 5/9/2022    Procedure: Angiogram, Coronary, with Left Heart Cath;  Surgeon: Arthur Dewey MD;  Location: [facility] CATH LAB;  Service: Cardiology;  Laterality: N/A;    EPIDURAL STEROID INJECTION  09/02/2020    C5-6 FABIANO - Dr Venegas    ESOPHAGOGASTRODUODENOSCOPY      HYSTERECTOMY      INJECTION OF FACET JOINT Left 12/27/2018    Left C6-7 Facet Injection - Deb    INJECTION OF FACET JOINT Right 05/19/2019    Right C4-7 Facet Injection X 2 -Deb    INJECTION OF FACET JOINT Left 02/27/2019    Left C4-7 Facet Injection -Deb    knee bilateral      RADIOFREQUENCY THERMOCOAGULATION Right 05/21/2019    Right C4-7 RFTC - Deb    RADIOFREQUENCY THERMOCOAGULATION Left 04/23/2019    Left C4-7 RFTC -Deb    shoulder bilateral      TONSILLECTOMY       Family History   Problem Relation Age of Onset    Stroke Mother     Diabetes Mother     Heart disease Mother     Hypertension Mother     Hypertension Father     Heart disease Father     Stroke Father     Lupus Father     Cancer Brother     Diabetes Brother     Hypertension Brother      Social History     Tobacco Use    Smoking status: Current Every Day Smoker    Smokeless tobacco: Never Used   Substance Use Topics    Alcohol use: Not Currently    Drug use: Never     Review of Systems   Constitutional: Negative.    HENT: Negative.    Eyes: Negative.    Respiratory: Shortness of breath: chronic.    Cardiovascular: Positive for palpitations (x 1 week).   Gastrointestinal: Negative.    Endocrine: Negative.    Genitourinary: Negative.    Musculoskeletal: Negative.    Skin: Negative.    Allergic/Immunologic: Negative.    Neurological: Positive for syncope and headaches (x 3 days).   Hematological: Negative.    Psychiatric/Behavioral: Negative.    All other systems reviewed and are negative.      Physical Exam     Initial Vitals [07/12/22 1643]   BP Pulse Resp Temp SpO2   109/74 86 19 98.2 °F (36.8 °C)  96 %      MAP       --         Physical Exam    Nursing note and vitals reviewed.  Constitutional: She appears well-developed and well-nourished.   HENT:   Head: Normocephalic and atraumatic.   Eyes: EOM are normal. Pupils are equal, round, and reactive to light.   Neck: Neck supple. No thyromegaly present.   Normal range of motion.  Cardiovascular: Normal rate, regular rhythm, normal heart sounds and intact distal pulses.   No murmur heard.  Pulmonary/Chest: Breath sounds normal. No respiratory distress. She has no wheezes.   Abdominal: Abdomen is soft. Bowel sounds are normal. She exhibits no distension. There is no abdominal tenderness.   Musculoskeletal:         General: No tenderness or edema. Normal range of motion.      Cervical back: Normal range of motion and neck supple.     Lymphadenopathy:     She has no cervical adenopathy.   Neurological: She is alert and oriented to person, place, and time. She has normal strength. No cranial nerve deficit or sensory deficit.   Skin: Skin is warm and dry. No rash noted.   Psychiatric: She has a normal mood and affect.         ED Course   Procedures  Labs Reviewed   COMPREHENSIVE METABOLIC PANEL - Abnormal; Notable for the following components:       Result Value    Glucose 186 (*)     Creatinine 1.05 (*)     Alk Phos 100 (*)     AST 10 (*)     All other components within normal limits   NT-PRO NATRIURETIC PEPTIDE - Abnormal; Notable for the following components:    ProBNP 224 (*)     All other components within normal limits   CBC WITH DIFFERENTIAL - Abnormal; Notable for the following components:    WBC 14.90 (*)     MCH 31.8 (*)     RDW 15.7 (*)     MPV 9.3 (*)     Neutrophils % 66.3 (*)     Lymphocytes % 23.8 (*)     Neutrophils, Abs 9.89 (*)     Monocytes, Absolute 0.83 (*)     Immature Granulocytes, Absolute 0.06 (*)     All other components within normal limits   TROPONIN I - Normal   CBC W/ AUTO DIFFERENTIAL    Narrative:     The following orders were created  for panel order CBC auto differential.  Procedure                               Abnormality         Status                     ---------                               -----------         ------                     CBC with Differential[[id number]]        Abnormal            Final result                 Please view results for these tests on the individual orders.   EXTRA TUBES    Narrative:     The following orders were created for panel order EXTRA TUBES.  Procedure                               Abnormality         Status                     ---------                               -----------         ------                     Light Blue Top Hold[[id number]]                              In process                 Pink Top Hold[[id number]]                                                                 Please view results for these tests on the individual orders.   LIGHT BLUE TOP HOLD   TROPONIN I          Imaging Results          CT Head Without Contrast (Final result)  Result time 07/12/22 20:13:06    Final result by Paul Laird MD (07/12/22 20:13:06)                 Impression:      1. No acute intracranial abnormality.    Place of service: [facility]      Electronically signed by: Paul Laird  Date:    07/12/2022  Time:    20:13             Narrative:    EXAMINATION:  CT HEAD WITHOUT CONTRAST    CLINICAL HISTORY:  syncope;    TECHNIQUE:  Axial CT imaging from the vertex to skull the skull base was performed without contrast. Total DLP: 895.3 mGy*cm    Dose reduction:    The CT exam was performed using one or more dose reduction techniques: Automatic exposure control, automated adjustment of the MA and/or kVP according to patient size, or use of iterative reconstruction technique.    COMPARISON:  Prior MRI dated 10/27/2016.    FINDINGS:  Cortical sulci, ventricles and basilar cisterns are within normal limits in appearance. There is no evidence of hydrocephalus, midline shift or mass effect. Gray and  white matter differentiation is preserved. There is no CT evidence of acute intracranial hemorrhage or infarction.    The calvarium is intact. The visualized orbits and globes appear within normal limits. The paranasal sinuses and mastoid air cells are predominantly clear. Scalp soft tissues appear unremarkable.                               X-Ray Chest AP Portable (Final result)  Result time 07/12/22 18:12:43    Final result by Paul Laird MD (07/12/22 18:12:43)                 Impression:      No acute cardiopulmonary process or significant change.    [facility] of service[facility]      Electronically signed by: Paul Laird  Date:    07/12/2022  Time:    18:12             Narrative:    EXAMINATION:  XR CHEST AP PORTABLE    CLINICAL HISTORY:  Syncope and collapse    COMPARISON:  Prior radiograph 05/09/2022    FINDINGS:  The cardiomediastinal silhouette is within normal limits. The lungs are clear. There is no pneumothorax or pleural effusion.    There is no acute osseous or soft tissue abnormality.                                 Medications - No data to display             Attending Attestation:           Physician Attestation for Scribe:  Physician Attestation Statement for Scribe #1: I, Janice Falcon MD, reviewed documentation, as scribed by Saida Gonzalez in my presence, and it is both accurate and complete.             ED Course as of 07/12/22 2028 Tue Jul 12, 2022 1818 NT-proBNP(!): 224 [BB]   1818 Glucose(!): 186 [BB]   1818 BUN: 9 [BB]   1818 Creatinine(!): 1.05 [BB]   1818 Troponin I High Sensitivity: 13.2 [BB]   1818 WBC(!): 14.90 [BB]   1903 NT-proBNP(!): 224 [BB]   1903 Sodium: 136 [BB]   1903 Potassium: 3.9 [BB]   1903 Glucose(!): 186 [BB]   1903 BUN: 9 [BB]   1903 Creatinine(!): 1.05 [BB]   2020 CT brain is normal. [BB]   2025 On my questioning patient believes that she probably had a seizure that was not witnessed, this has happened many times in the past.  Patient  states she was here mainly because she was concerned about hitting her head.  CT brain is negative, will discharge home. [BB]      ED Course User Index  [BB] Anil Matias MD             Clinical Impression:   Final diagnoses:  [R55] Syncope  [R55] Syncope          ED Disposition Condition    Discharge Stable        ED Prescriptions     None        Follow-up Information    None          Anil Matias MD  07/12/22 2028

## 2022-07-12 NOTE — Clinical Note
Eliezer accompanied their spouse to the emergency department on 7/12/2022. They may return to work on 07/13/2022.      If you have any questions or concerns, please don't hesitate to call.       KELTON

## 2022-07-13 ENCOUNTER — TELEPHONE (OUTPATIENT)
Dept: EMERGENCY MEDICINE | Facility: HOSPITAL | Age: 42
End: 2022-07-13
Payer: MEDICARE

## 2022-07-13 NOTE — DISCHARGE INSTRUCTIONS
Follow-up in clinic with primary care provider as scheduled.  Return to emergency department for any worsening or further problems.  Continue home medications as prescribed.

## 2022-07-20 ENCOUNTER — OFFICE VISIT (OUTPATIENT)
Dept: RHEUMATOLOGY | Facility: CLINIC | Age: 42
End: 2022-07-20
Payer: MEDICARE

## 2022-07-20 VITALS
HEART RATE: 68 BPM | RESPIRATION RATE: 16 BRPM | OXYGEN SATURATION: 98 % | SYSTOLIC BLOOD PRESSURE: 100 MMHG | DIASTOLIC BLOOD PRESSURE: 60 MMHG | WEIGHT: 189 LBS | BODY MASS INDEX: 34.57 KG/M2

## 2022-07-20 DIAGNOSIS — M05.79 RHEUMATOID ARTHRITIS INVOLVING MULTIPLE SITES WITH POSITIVE RHEUMATOID FACTOR: Primary | ICD-10-CM

## 2022-07-20 PROCEDURE — 99215 OFFICE O/P EST HI 40 MIN: CPT | Mod: S$PBB,,, | Performed by: INTERNAL MEDICINE

## 2022-07-20 PROCEDURE — 99215 PR OFFICE/OUTPT VISIT, EST, LEVL V, 40-54 MIN: ICD-10-PCS | Mod: S$PBB,,, | Performed by: INTERNAL MEDICINE

## 2022-07-20 PROCEDURE — 99213 OFFICE O/P EST LOW 20 MIN: CPT | Mod: PBBFAC | Performed by: INTERNAL MEDICINE

## 2022-07-20 NOTE — PATIENT INSTRUCTIONS
Rheumatology PLAN:  --continue Methotrexate 6 tabs po weekly  -- continue folic acid daily  -- not a candidate for biologic therapy so soon after CABG and STEMI  -- will reassess in 1 year  -- Meanwhile needs following vaccinations:  Hep B booster  Pneumococcal   Influenza  Shingrix

## 2022-07-20 NOTE — PROGRESS NOTES
Trinity Dewey MD   [facility] - RHEUMATOLOGY  [address] [place] [address]  [phone]      PATIENT NAME: Cherry Smiley  : 1980  DATE: 22  MRN: [id number]      Billing Provider: Trinity Dewey MD  Level of Service:   Patient PCP Information     Provider PCP Type    King Griffin MD General          Reason for Visit / Chief Complaint: Rheumatoid Arthritis (Follow up RA currently taking mtx and folic acid)           Assessment and Plan (including Health Maintenance)      Problem List  Smart Sets  Document Outside HM   :    Plan:     There are no diagnoses linked to this encounter. Patient states she is tolerating mtx.   However, today presented with chest pain which in intermittent and started 3 days ago. Patient relates severe stress at home related to daughter's pregnancy.   CP is in setting of /110 with radiation to left shoulder and numbness down left arm.   Patient is advised to immediately go to ED. May need cardiac evaluation.   Should continue methotrexate upon discharge and set up rheumatology followup after cardiac workup.   Patient is also Hep C antibody positive and viral RNA titers are unknown.     2022;  S/p CABG since last visit on Brilinta and Metoprolol.  Would continue methotrexate 6 tab/ week.   Reminder to take folic acid            Total time spent in Assessment, Co-ordination of Care , Review of Care Plan , Goal Setting and Counseling on this initial visit is ~ 60 minutes     There is currently no information documented on the homunculus. Go to the Rheumatology activity and complete the homunculus joint exam.               History of Present Illness / Problem Focused Workflow     Cherry Smiley presents to the clinic with Rheumatoid Arthritis (Follow up RA currently taking mtx and folic acid)     Dx with RA 3 years ago. Was experiencing swelling in her small joints including hands, wrists and elbows.   Also had bilateral  rotator cuff injuries.   Hx of childhood traumatic fractures of the right patella and R foot. All were traumatic.   Patient states the epidural injections to the spine are not helping.   States she is unable to move when she awakens in the morning. Stiffness is severe.    who accompanies her relates that she is not able to get in and out of a car easily.   Patient is also on Percocet twice daily  No h/o DMARD initiation. States her rheumatologist has her on NSAIDs although she has not followed up since 2019.   Is active smoker - one pack per day   Significant dental cavities.   Does have h/o synovitis or swelling in her knees.   Also has h/o chest pain.       Review of Systems     Review of Systems   Constitutional: Positive for unexpected weight change. Negative for fever.   HENT: Negative for mouth sores and trouble swallowing.    Eyes: Negative for redness.   Respiratory: Positive for shortness of breath. Negative for cough.    Cardiovascular: Positive for chest pain.   Gastrointestinal: Negative for constipation and diarrhea.   Skin: Negative for rash.   Neurological: Positive for headaches.   Hematological: Bruises/bleeds easily.       Medical / Social / Family History     Past Medical History:   Diagnosis Date    Allergy     Anxiety     Congenital hypothyroidism     Depressive disorder     GERD (gastroesophageal reflux disease)     Hyperlipidemia     Hypertension     Insomnia     Irritable bowel syndrome     Lumbosacral spondylosis     NSTEMI (non-ST elevated myocardial infarction) 05/09/2022    Primary fibromyalgia syndrome     Seizures     Type 2 diabetes mellitus        Past Surgical History:   Procedure Laterality Date    ADENOIDECTOMY      ANGIOGRAM, CORONARY, WITH LEFT HEART CATHETERIZATION N/A 5/9/2022    Procedure: Angiogram, Coronary, with Left Heart Cath;  Surgeon: Arthur Dewey MD;  Location: [facility] CATH LAB;  Service: Cardiology;  Laterality: N/A;    EPIDURAL  STEROID INJECTION  09/02/2020    C5-6 FABIANO - Dr Venegas    ESOPHAGOGASTRODUODENOSCOPY      HYSTERECTOMY      INJECTION OF FACET JOINT Left 12/27/2018    Left C6-7 Facet Injection - Deb    INJECTION OF FACET JOINT Right 05/19/2019    Right C4-7 Facet Injection X 2 -Deb    INJECTION OF FACET JOINT Left 02/27/2019    Left C4-7 Facet Injection -Deb    knee bilateral      RADIOFREQUENCY THERMOCOAGULATION Right 05/21/2019    Right C4-7 RFTC - Deb    RADIOFREQUENCY THERMOCOAGULATION Left 04/23/2019    Left C4-7 RFTC -Deb    shoulder bilateral      TONSILLECTOMY         Social History  Ms. Cherry Smiley  reports that she has been smoking. She has never used smokeless tobacco. She reports previous alcohol use. She reports that she does not use drugs.    Family History  Ms.'s Cherry Smiley family history includes Cancer in her brother; Diabetes in her brother and mother; Heart disease in her father and mother; Hypertension in her brother, father, and mother; Lupus in her father; Stroke in her father and mother.    Medications and Allergies     Medications  Outpatient Medications Marked as Taking for the 7/20/22 encounter (Office Visit) with Trinity Dewey MD   Medication Sig Dispense Refill    aspirin (ECOTRIN) 81 MG EC tablet Take 1 tablet (81 mg total) by mouth once daily.  0    atorvastatin (LIPITOR) 80 MG tablet Take 1 tablet (80 mg total) by mouth every evening. 30 tablet 0    dapagliflozin-metformin (XIGDUO XR) 5-500 mg TBph Take by mouth.      folic acid (FOLVITE) 1 MG tablet Take 1 tablet (1 mg total) by mouth once daily. 100 tablet 1    gabapentin (NEURONTIN) 300 MG capsule Take 1 capsule (300 mg total) by mouth every 8 (eight) hours. 90 capsule 1    insulin detemir U-100 (LEVEMIR) 100 unit/mL injection Inject 40 Units into the skin every evening.  12    insulin NPH-insulin regular, 70/30, (NOVOLIN 70/30) 100 unit/mL (70-30) injection Inject into the skin.      isosorbide mononitrate (IMDUR) 30  MG 24 hr tablet Take 1 tablet (30 mg total) by mouth once daily. 30 tablet 4    levETIRAcetam (KEPPRA) 750 MG Tab Take 1 tablet (750 mg total) by mouth 2 (two) times daily. 60 tablet 11    lisinopriL 10 MG tablet Take 1 tablet (10 mg total) by mouth once daily. 90 tablet 3    metoprolol succinate (TOPROL-XL) 200 MG 24 hr tablet Take 1 tablet (200 mg total) by mouth once daily. 90 tablet 3    oxyCODONE-acetaminophen (PERCOCET) 7.5-325 mg per tablet Take 1 tablet by mouth every 8 (eight) hours. 90 tablet 0    ticagrelor (BRILINTA) 90 mg tablet Take 1 tablet (90 mg total) by mouth 2 (two) times daily. 180 tablet 3       Allergies  Review of patient's allergies indicates:   Allergen Reactions    Cinnamon analogues Anaphylaxis    Iodine Anaphylaxis    Morpholine analogues Shortness Of Breath    Shellfish containing products Anaphylaxis    Pamelor [nortriptyline]        Physical Examination     Vitals:    07/20/22 1549   BP: 100/60   Pulse: 68   Resp: 16     Physical Exam  Constitutional:       Appearance: She is obese.   HENT:      Head: Normocephalic and atraumatic.      Right Ear: External ear normal.      Left Ear: External ear normal.      Nose: Nose normal. No congestion or rhinorrhea.   Eyes:      Extraocular Movements: Extraocular movements intact.      Pupils: Pupils are equal, round, and reactive to light.   Cardiovascular:      Pulses: Normal pulses.   Pulmonary:      Effort: Pulmonary effort is normal.      Breath sounds: Normal breath sounds. No wheezing.   Chest:      Chest wall: No tenderness.   Musculoskeletal:         General: Swelling and tenderness present. Normal range of motion.      Cervical back: No rigidity or tenderness.      Comments: Multiple tender and swollen joints MCP ++   Spinal stiffness +    Lymphadenopathy:      Cervical: No cervical adenopathy.   Skin:     Findings: No rash.   Neurological:      General: No focal deficit present.      Mental Status: She is alert and oriented  to person, place, and time.   Psychiatric:         Mood and Affect: Mood normal.         Thought Content: Thought content normal.                Signature:  Trinity Dewey MD  [facility] RHEUMATOLOGY  [address] [place] [address]  [phone]    Date of encounter: 7/20/22

## 2022-07-26 DIAGNOSIS — M05.79 RHEUMATOID ARTHRITIS INVOLVING MULTIPLE SITES WITH POSITIVE RHEUMATOID FACTOR: Chronic | ICD-10-CM

## 2022-07-26 DIAGNOSIS — M45.0 ANKYLOSING SPONDYLITIS OF MULTIPLE SITES IN SPINE: ICD-10-CM

## 2022-07-27 RX ORDER — METHOTREXATE 2.5 MG/1
TABLET ORAL
Qty: 72 TABLET | Refills: 1 | Status: SHIPPED | OUTPATIENT
Start: 2022-07-27 | End: 2023-02-01 | Stop reason: SDUPTHER

## 2022-07-29 ENCOUNTER — OFFICE VISIT (OUTPATIENT)
Dept: NEUROLOGY | Facility: CLINIC | Age: 42
End: 2022-07-29
Payer: MEDICARE

## 2022-07-29 VITALS
SYSTOLIC BLOOD PRESSURE: 104 MMHG | HEART RATE: 79 BPM | WEIGHT: 187 LBS | HEIGHT: 62 IN | DIASTOLIC BLOOD PRESSURE: 60 MMHG | BODY MASS INDEX: 34.41 KG/M2

## 2022-07-29 DIAGNOSIS — F45.41 STRESS HEADACHES: Primary | ICD-10-CM

## 2022-07-29 PROCEDURE — 99215 OFFICE O/P EST HI 40 MIN: CPT | Mod: PBBFAC | Performed by: PSYCHIATRY & NEUROLOGY

## 2022-07-29 PROCEDURE — 99214 OFFICE O/P EST MOD 30 MIN: CPT | Mod: S$PBB,,, | Performed by: PSYCHIATRY & NEUROLOGY

## 2022-07-29 PROCEDURE — 99214 PR OFFICE/OUTPT VISIT, EST, LEVL IV, 30-39 MIN: ICD-10-PCS | Mod: S$PBB,,, | Performed by: PSYCHIATRY & NEUROLOGY

## 2022-07-29 RX ORDER — PROMETHAZINE HYDROCHLORIDE 25 MG/1
25 TABLET ORAL EVERY 6 HOURS PRN
Status: ON HOLD | COMMUNITY
End: 2022-09-15 | Stop reason: HOSPADM

## 2022-07-29 NOTE — PATIENT INSTRUCTIONS
Stop smoking   Caution w/ opioid narcotic pain meds   Refrain from OTC meds and HEADACHE powders   F/u Cardiology routinely - CABG pending   Cont ASPIRIN and statin  Cont Keppra 750 mg but bid dosing as indicated   NO driving for 6 months after any alteration or loss of consciousness per [place] state law  F/u 6 months     Pt will contact her insurance comp to see what HEADACHE meds are approved ?
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
## 2022-08-03 ENCOUNTER — PATIENT MESSAGE (OUTPATIENT)
Dept: PAIN MEDICINE | Facility: CLINIC | Age: 42
End: 2022-08-03
Payer: MEDICARE

## 2022-08-03 ENCOUNTER — OUTSIDE PLACE OF SERVICE (OUTPATIENT)
Dept: CARDIOLOGY | Facility: HOSPITAL | Age: 42
End: 2022-08-03
Payer: MEDICARE

## 2022-08-03 DIAGNOSIS — E11.9 DIABETES MELLITUS WITHOUT COMPLICATION: Primary | ICD-10-CM

## 2022-08-03 PROCEDURE — 93010 ELECTROCARDIOGRAM REPORT: CPT | Mod: ,,, | Performed by: STUDENT IN AN ORGANIZED HEALTH CARE EDUCATION/TRAINING PROGRAM

## 2022-08-03 PROCEDURE — 93010 PR ELECTROCARDIOGRAM REPORT: ICD-10-PCS | Mod: ,,, | Performed by: STUDENT IN AN ORGANIZED HEALTH CARE EDUCATION/TRAINING PROGRAM

## 2022-08-04 ENCOUNTER — OFFICE VISIT (OUTPATIENT)
Dept: PAIN MEDICINE | Facility: CLINIC | Age: 42
End: 2022-08-04
Payer: MEDICARE

## 2022-08-04 VITALS
HEIGHT: 62 IN | SYSTOLIC BLOOD PRESSURE: 109 MMHG | DIASTOLIC BLOOD PRESSURE: 59 MMHG | HEART RATE: 68 BPM | WEIGHT: 181 LBS | BODY MASS INDEX: 33.31 KG/M2

## 2022-08-04 DIAGNOSIS — M47.812 SPONDYLOSIS WITHOUT MYELOPATHY OR RADICULOPATHY, CERVICAL REGION: Chronic | ICD-10-CM

## 2022-08-04 DIAGNOSIS — M05.79 RHEUMATOID ARTHRITIS INVOLVING MULTIPLE SITES WITH POSITIVE RHEUMATOID FACTOR: Primary | Chronic | ICD-10-CM

## 2022-08-04 DIAGNOSIS — M25.512 ACUTE PAIN OF LEFT SHOULDER: ICD-10-CM

## 2022-08-04 DIAGNOSIS — M45.0 ANKYLOSING SPONDYLITIS OF MULTIPLE SITES IN SPINE: ICD-10-CM

## 2022-08-04 PROCEDURE — 99214 OFFICE O/P EST MOD 30 MIN: CPT | Mod: PBBFAC | Performed by: PHYSICIAN ASSISTANT

## 2022-08-04 PROCEDURE — 99214 OFFICE O/P EST MOD 30 MIN: CPT | Mod: S$PBB,,, | Performed by: PHYSICIAN ASSISTANT

## 2022-08-04 PROCEDURE — 99214 PR OFFICE/OUTPT VISIT, EST, LEVL IV, 30-39 MIN: ICD-10-PCS | Mod: S$PBB,,, | Performed by: PHYSICIAN ASSISTANT

## 2022-08-04 RX ORDER — OXYCODONE AND ACETAMINOPHEN 7.5; 325 MG/1; MG/1
1 TABLET ORAL EVERY 12 HOURS PRN
Qty: 60 TABLET | Refills: 0 | Status: SHIPPED | OUTPATIENT
Start: 2022-08-13 | End: 2022-09-12

## 2022-08-04 RX ORDER — OXYCODONE AND ACETAMINOPHEN 7.5; 325 MG/1; MG/1
1 TABLET ORAL EVERY 8 HOURS
Qty: 90 TABLET | Refills: 0 | Status: SHIPPED | OUTPATIENT
Start: 2022-08-13 | End: 2022-08-04

## 2022-08-04 RX ORDER — OXYCODONE AND ACETAMINOPHEN 7.5; 325 MG/1; MG/1
1 TABLET ORAL EVERY 12 HOURS PRN
Qty: 60 TABLET | Refills: 0 | Status: SHIPPED | OUTPATIENT
Start: 2022-09-12 | End: 2022-09-28 | Stop reason: SDUPTHER

## 2022-08-04 RX ORDER — GABAPENTIN 300 MG/1
300 CAPSULE ORAL EVERY 8 HOURS
Qty: 90 CAPSULE | Refills: 1 | Status: SHIPPED | OUTPATIENT
Start: 2022-08-04 | End: 2022-09-28 | Stop reason: SDUPTHER

## 2022-08-04 RX ORDER — OXYCODONE AND ACETAMINOPHEN 7.5; 325 MG/1; MG/1
1 TABLET ORAL EVERY 8 HOURS
Qty: 90 TABLET | Refills: 0 | Status: SHIPPED | OUTPATIENT
Start: 2022-09-12 | End: 2022-08-04

## 2022-08-04 NOTE — PROGRESS NOTES
Subjective:      Patient ID: Cherry Smiley is a 41 y.o. female.    Chief Complaint: Back Pain and Shoulder Pain (Left shoulder)      Pain  This is a chronic problem. The current episode started more than 1 year ago. The problem occurs daily. The problem has been waxing and waning. Associated symptoms include arthralgias. Pertinent negatives include no change in bowel habit, chills, coughing, fatigue, rash, sore throat, vertigo or vomiting.     Review of Systems   Constitutional: Negative for appetite change, chills, fatigue and unexpected weight change.   HENT: Negative for drooling, ear discharge, facial swelling, nosebleeds, sore throat, trouble swallowing, voice change and goiter.    Eyes: Negative for photophobia, pain, discharge, redness and visual disturbance.   Respiratory: Negative for apnea, cough, choking, chest tightness, shortness of breath, wheezing and stridor.    Cardiovascular: Negative for palpitations and leg swelling.   Gastrointestinal: Negative for abdominal distention, change in bowel habit, diarrhea, rectal pain, vomiting, fecal incontinence and change in bowel habit.   Endocrine: Negative for cold intolerance, heat intolerance, polydipsia, polyphagia and polyuria.   Genitourinary: Negative for flank pain, frequency and hot flashes.   Musculoskeletal: Positive for arthralgias and neck stiffness.   Integumentary:  Negative for color change, pallor and rash.   Allergic/Immunologic: Negative for immunocompromised state.   Neurological: Negative for dizziness, vertigo, seizures, syncope, facial asymmetry, speech difficulty, light-headedness, disturbances in coordination, memory loss and coordination difficulties.   Hematological: Negative for adenopathy. Does not bruise/bleed easily.   Psychiatric/Behavioral: Negative for agitation, behavioral problems, confusion, decreased concentration, dysphoric mood, hallucinations, self-injury and suicidal ideas. The patient is not nervous/anxious and is  "not hyperactive.             Past Surgical History:   Procedure Laterality Date    ADENOIDECTOMY      ANGIOGRAM, CORONARY, WITH LEFT HEART CATHETERIZATION N/A 5/9/2022    Procedure: Angiogram, Coronary, with Left Heart Cath;  Surgeon: Arthur Dewey MD;  [email or web address];  Service: Cardiology;  Laterality: N/A;    EPIDURAL STEROID INJECTION  09/02/2020    C5-6 [facility] - Dr Venegas    ESOPHAGOGASTRODUODENOSCOPY      HYSTERECTOMY      INJECTION OF FACET JOINT Left 12/27/2018    Left C6-7 Facet Injection - Deb    INJECTION OF FACET JOINT Right 05/19/2019    Right C4-7 Facet Injection X 2 -Deb    INJECTION OF FACET JOINT Left 02/27/2019    Left C4-7 Facet Injection -Deb    knee bilateral      RADIOFREQUENCY THERMOCOAGULATION Right 05/21/2019    Right C4-7 RFTC - Deb    RADIOFREQUENCY THERMOCOAGULATION Left 04/23/2019    Left C4-7 RFTC -Deb    shoulder bilateral      TONSILLECTOMY         Objective:  Vitals:    08/04/22 0958 08/04/22 0959   BP: (!) 109/59    Pulse: 68    Weight: 82.1 kg (181 lb)    Height: 5' 2" (1.575 m)    PainSc:   8   6         Physical Exam  Vitals and nursing note reviewed. Exam conducted with a chaperone present.   Constitutional:       General: She is awake.      Appearance: Normal appearance. She is not ill-appearing, toxic-appearing or diaphoretic.   HENT:      Head: Normocephalic and atraumatic.      Nose: Nose normal.      Mouth/Throat:      Mouth: Mucous membranes are moist.      Pharynx: Oropharynx is clear.   Eyes:      Conjunctiva/sclera: Conjunctivae normal.      Pupils: Pupils are equal, round, and reactive to light.   Cardiovascular:      Rate and Rhythm: Normal rate.   Pulmonary:      Effort: Pulmonary effort is normal. No respiratory distress.   Abdominal:      Palpations: Abdomen is soft.   Musculoskeletal:         General: Normal range of motion.      Right shoulder: Tenderness present.      Left shoulder: Tenderness present.      Right wrist: Tenderness " present.      Left wrist: Tenderness present.      Cervical back: Normal range of motion and neck supple. Tenderness present.      Thoracic back: Tenderness present.      Lumbar back: Tenderness present.   Skin:     General: Skin is warm and dry.   Neurological:      General: No focal deficit present.      Mental Status: She is alert and oriented to person, place, and time. Mental status is at baseline.      Cranial Nerves: No cranial nerve deficit (II-XII).   Psychiatric:         Mood and Affect: Mood normal.         Behavior: Behavior normal. Behavior is cooperative.         Thought Content: Thought content normal.           CT Head Without Contrast  Narrative: EXAMINATION:  CT HEAD WITHOUT CONTRAST    CLINICAL HISTORY:  syncope;    TECHNIQUE:  Axial CT imaging from the vertex to skull the skull base was performed without contrast. Total DLP: 895.3 mGy*cm    Dose reduction:    The CT exam was performed using one or more dose reduction techniques: Automatic exposure control, automated adjustment of the MA and/or kVP according to patient size, or use of iterative reconstruction technique.    COMPARISON:  Prior MRI dated 10/27/2016.    FINDINGS:  Cortical sulci, ventricles and basilar cisterns are within normal limits in appearance. There is no evidence of hydrocephalus, midline shift or mass effect. Gray and white matter differentiation is preserved. There is no CT evidence of acute intracranial hemorrhage or infarction.    The calvarium is intact. The visualized orbits and globes appear within normal limits. The paranasal sinuses and mastoid air cells are predominantly clear. Scalp soft tissues appear unremarkable.  Impression: 1. No acute intracranial abnormality.    Place of service: [facility]    Electronically signed by: Paul Laird  Date:    07/12/2022  Time:    20:13  X-Ray Chest AP Portable  Narrative: EXAMINATION:  XR CHEST AP PORTABLE    CLINICAL HISTORY:  Syncope and collapse    COMPARISON:  Prior  radiograph 05/09/2022    FINDINGS:  The cardiomediastinal silhouette is within normal limits. The lungs are clear. There is no pneumothorax or pleural effusion.    There is no acute osseous or soft tissue abnormality.  Impression: No acute cardiopulmonary process or significant change.    Place of service: [facility]    Electronically signed by: Paul Laird  Date:    07/12/2022  Time:    18:12       Admission on 07/12/2022, Discharged on 07/12/2022   Component Date Value Ref Range Status    Sodium 07/12/2022 136  136 - 145 mmol/L Final    Potassium 07/12/2022 3.9  3.5 - 5.1 mmol/L Final    Chloride 07/12/2022 100  98 - 107 mmol/L Final    CO2 07/12/2022 26  21 - 32 mmol/L Final    Anion Gap 07/12/2022 14  7 - 16 mmol/L Final    Glucose 07/12/2022 186 (A) 74 - 106 mg/dL Final    BUN 07/12/2022 9  7 - 18 mg/dL Final    Creatinine 07/12/2022 1.05 (A) 0.55 - 1.02 mg/dL Final    BUN/Creatinine Ratio 07/12/2022 9  6 - 20 Final    Calcium 07/12/2022 8.9  8.5 - 10.1 mg/dL Final    Total Protein 07/12/2022 7.5  6.4 - 8.2 g/dL Final    Albumin 07/12/2022 3.6  3.5 - 5.0 g/dL Final    Globulin 07/12/2022 3.9  2.0 - 4.0 g/dL Final    A/G Ratio 07/12/2022 0.9   Final    Bilirubin, Total 07/12/2022 0.3  0.0 - 1.2 mg/dL Final    Alk Phos 07/12/2022 100 (A) 37 - 98 U/L Final    ALT 07/12/2022 27  13 - 56 U/L Final    AST 07/12/2022 10 (A) 15 - 37 U/L Final    eGFR 07/12/2022 61  >=60 mL/min/1.73m² Final    Troponin I High Sensitivity 07/12/2022 13.2  <=60.4 pg/mL Final    ProBNP 07/12/2022 224 (A) 1 - 125 pg/mL Final    WBC 07/12/2022 14.90 (A) 4.50 - 11.00 K/uL Final    RBC 07/12/2022 4.72  4.20 - 5.40 M/uL Final    Hemoglobin 07/12/2022 15.0  12.0 - 16.0 g/dL Final    Hematocrit 07/12/2022 44.3  38.0 - 47.0 % Final    MCV 07/12/2022 93.9  80.0 - 96.0 fL Final    MCH 07/12/2022 31.8 (A) 27.0 - 31.0 pg Final    MCHC 07/12/2022 33.9  32.0 - 36.0 g/dL Final    RDW 07/12/2022 15.7 (A) 11.5  - 14.5 % Final    Platelet Count 07/12/2022 350  150 - 400 K/uL Final    MPV 07/12/2022 9.3 (A) 9.4 - 12.4 fL Final    Neutrophils % 07/12/2022 66.3 (A) 53.0 - 65.0 % Final    Lymphocytes % 07/12/2022 23.8 (A) 27.0 - 41.0 % Final    Monocytes % 07/12/2022 5.6  2.0 - 6.0 % Final    Eosinophils % 07/12/2022 3.3  1.0 - 4.0 % Final    Basophils % 07/12/2022 0.6  0.0 - 1.0 % Final    Immature Granulocytes % 07/12/2022 0.4  0.0 - 0.4 % Final    nRBC, Auto 07/12/2022 0.0  <=0.0 % Final    Neutrophils, Abs 07/12/2022 9.89 (A) 1.80 - 7.70 K/uL Final    Lymphocytes, Absolute 07/12/2022 3.54  1.00 - 4.80 K/uL Final    Monocytes, Absolute 07/12/2022 0.83 (A) 0.00 - 0.80 K/uL Final    Eosinophils, Absolute 07/12/2022 0.49  0.00 - 0.50 K/uL Final    Basophils, Absolute 07/12/2022 0.09  0.00 - 0.20 K/uL Final    Immature Granulocytes, Absolute 07/12/2022 0.06 (A) 0.00 - 0.04 K/uL Final    nRBC, Absolute 07/12/2022 0.00  <=0.00 x10e3/uL Final    Diff Type 07/12/2022 Auto   Final    Troponin I High Sensitivity 07/12/2022 10.7  <=60.4 pg/mL Final   Office Visit on 06/13/2022   Component Date Value Ref Range Status    POC Amphetamines 06/13/2022 Negative  Negative, Inconclusive Final    POC Barbiturates 06/13/2022 Negative  Negative, Inconclusive Final    POC Benzodiazepines 06/13/2022 Negative  Negative, Inconclusive Final    POC Cocaine 06/13/2022 Negative  Negative, Inconclusive Final    POC THC 06/13/2022 Negative  Negative, Inconclusive Final    POC Methadone 06/13/2022 Negative  Negative, Inconclusive Final    POC Methamphetamine 06/13/2022 Negative  Negative, Inconclusive Final    POC Opiates 06/13/2022 Negative  Negative, Inconclusive Final    POC Oxycodone 06/13/2022 Presumptive Positive (A) Negative, Inconclusive Final    POC Phencyclidine 06/13/2022 Negative  Negative, Inconclusive Final    POC Methylenedioxymethamphetamine * 06/13/2022 Negative  Negative, Inconclusive Final    POC Tricyclic  Antidepressants 06/13/2022 Negative  Negative, Inconclusive Final    POC Buprenorphine 06/13/2022 Negative   Final     Acceptable 06/13/2022 Yes   Final    POC Temperature (Urine) 06/13/2022 90   Final   Lab Visit on 05/31/2022   Component Date Value Ref Range Status    Hemoglobin A1C 05/31/2022 8.9 (A) 4.5 - 6.6 % Final    Estimated Average Glucose 05/31/2022 210  mg/dL Final   No results displayed because visit has over 200 results.      Lab Visit on 04/07/2022   Component Date Value Ref Range Status    Nil Result, TB 04/07/2022 0.00   Final    TB1 Ag minus Nil Result 04/07/2022 0.02   Final    TB2 Ag minus Nil Result 04/07/2022 0.04   Final    Mitogen minus Nil Result, TB 04/07/2022 >10.00   Final    QuantiFERON-Tb Gold Plus 04/07/2022 Negative  Negative Final   Lab Visit on 04/07/2022   Component Date Value Ref Range Status    Hepatitis C Ab 04/07/2022 Reactive (A) Non-Reactive Final    Hepatitis B Surface Ag 04/07/2022 Non-Reactive  Non-Reactive Final    Hepatitis B Surface Ab 04/07/2022 Non-Reactive  Non-Reactive Final    CCP 04/07/2022 <16.0  <=19.9 units Final    RA Titer 04/07/2022 <10  0 - 15 IU/mL Final    CRP 04/07/2022 1.13 (A) 0.00 - 0.80 mg/dL Final    ESR Westergren 04/07/2022 12  0 - 20 mm/Hr Final    HLA-B27 Result 04/07/2022 Negative  Not Applicable Final    Interpretation 04/07/2022 SEE COMMENTS   Final    HCV RNA Detect/Quant 04/07/2022 Undetected  Undetected IU/mL Final   Office Visit on 02/10/2022   Component Date Value Ref Range Status    POC Amphetamines 02/10/2022 Negative  Negative, Inconclusive Final    POC Barbiturates 02/10/2022 Negative  Negative, Inconclusive Final    POC Benzodiazepines 02/10/2022 Negative  Negative, Inconclusive Final    POC Cocaine 02/10/2022 Negative  Negative, Inconclusive Final    POC THC 02/10/2022 Negative  Negative, Inconclusive Final    POC Methadone 02/10/2022 Negative  Negative, Inconclusive Final    POC  Methamphetamine 02/10/2022 Negative  Negative, Inconclusive Final    POC Opiates 02/10/2022 Negative  Negative, Inconclusive Final    POC Oxycodone 02/10/2022 Presumptive Positive (A) Negative, Inconclusive Final    POC Phencyclidine 02/10/2022 Negative  Negative, Inconclusive Final    POC Methylenedioxymethamphetamine * 02/10/2022 Negative  Negative, Inconclusive Final    POC Tricyclic Antidepressants 02/10/2022 Negative  Negative, Inconclusive Final    POC Buprenorphine 02/10/2022 Negative   Final     Acceptable 02/10/2022 Yes   Final    POC Temperature (Urine) 02/10/2022 94   Final           Assessment:      1. Rheumatoid arthritis involving multiple sites with positive rheumatoid factor    2. Ankylosing spondylitis of multiple sites in spine    3. Acute pain of left shoulder    4. Spondylosis without myelopathy or radiculopathy, cervical region          No orders of the defined types were placed in this encounter.        A's of Opioid Risk Assessment  Activity:Patient can perform ADL.   Analgesia:Patients pain is partially controlled by current medication. Patient has tried OTC medications such as Tylenol and Ibuprofen with out relief.   Adverse Effects: Patient denies constipation or sedation.  Aberrant Behavior:  reviewed with no aberrant drug seeking/taking behavior.  Overdose reversal drug naloxone discussed    Drug screen reviewed      Requested Prescriptions     Signed Prescriptions Disp Refills    gabapentin (NEURONTIN) 300 MG capsule 90 capsule 1     Sig: Take 1 capsule (300 mg total) by mouth every 8 (eight) hours.    oxyCODONE-acetaminophen (PERCOCET) 7.5-325 mg per tablet 60 tablet 0     Sig: Take 1 tablet by mouth every 12 (twelve) hours as needed for Pain.    oxyCODONE-acetaminophen (PERCOCET) 7.5-325 mg per tablet 60 tablet 0     Sig: Take 1 tablet by mouth every 12 (twelve) hours as needed for Pain.         Plan:    Follows rheumatology [facility] rheumatoid  arthritis, ankylosing spondylitis    Has cardiac bypass surgery schedule [facility] next week    She is rated resume her Percocet 7.51 p.o. q.12 hours    She states she would like to continue conservative management from our standpoint    Continue home exercise program as directed    Continue current medication    Follow-up 2 months     Dr. Venegas, September 2022    Pill count    Physical therapy    Bring original prescription medication bottles/container/box with labels to each visit

## 2022-08-31 NOTE — H&P (VIEW-ONLY)
PCP: King Griffin MD    Referring Provider:     Subjective:   Cherry Smiley is a 42 y.o. female with hx of htn, diabetes, HLD, chronic pain syndrome, opioid dependence, fibromyalgia, who presents for follow up.   Patient states she has been active.  Patient states has been seen in sleep clinic but has never had sleep study scheduled.     9/1/22 - Patient states she is still having chest pain and more frequent pain in left arm., worse with exertion.  Patient states she has been active.  She states she has been seen in sleep clinic but never scheduled for sleep study.     Dr. Espinoza  8/22/22 -   severe coronary artery disease. She underwent [facility] with PCI of Lcx/OM2 with IZABELA. Patient has complained of intermittent but persistent chest discomfort described as left shoulder and arm discomfort with exercise. In my opinion if bypass surgery were to be considered I would be very interested in the current status of her coronary artery anatomy and specifically has she demonstrated stent patency in the treated region. I would be leery to rush to surgical revascularization in the face of patent stents and no change in the patient's otherwise smooth and chronic 70% lesions as have been outlined above.     Severe circumflex and obtuse marginal stenosis treated with emergent angioplasty and stenting in May 2022. By report the patient has intermittent exercise-induced angina. She has known 70% stenoses which appear chronic to me involving the LAD and right coronary artery distribution.  Plan repeat coronary angiography to assess the patency of the aforementioned stents and the current status of the LAD and right coronary artery lesions. I would like to avoid a surgical revascularization in this young female if clinically appropriate.    6/30/22 - Patient states she still has some chest pain and heart flutters.  She is able to do most of her daily activities.  She is working in garden without significant chest pain, has heart  fluttering.  She reports fatigue with activity.       5/31/22 - Patient states she has fatigue and weakness with walking.  Has walker for ambulation.  Has home health and cardiac therapy.  She had one episode of chest pain associated with stress.  She reports shortness of breath with walking.  She brought in blood pressure log which are controlled.   Pt had two stents placed in her Lcx.  She has triple vessel disease    Fhx:  Family history of heart disease.   Shx: Smoker     EKG 5/11/22 - Sinus rhythm  with PVCs.  Short MT interval.                           Poor R wave progression.  Inferior/lateral ST-T abnormality  may be due to myocardial ischemia.                             Low QRS voltages in precordial leads   ECHO 5/10/22 -  The left ventricle is normal in size with normal systolic function.  EF 60%.                                Normal right ventricular size with normal right ventricular systolic function.                                 Mild tricuspid regurgitation.  The estimated PA systolic pressure is 37 mmHg.  [facility] 5/9/22 -    1.  Three vessel coronary artery disease ( 70% pLAD - iFR 0.78,  99% thrombotic occlusion of OM2, and 70% RCA)  2.  Slightly elevated left sided filling (LVEDP = 15mmHg)  3.  S/P successful bifurcation PCI of LCx/OM2 with IZABELA (ZOILA Vazquez)  Medical management for residual disease; if persistent anginal symptoms will refer for CABG      Arterial duplex 6/6/22 - Mild atherosclerotic disease bilaterally.     Lab Results   Component Value Date     07/12/2022    K 3.9 07/12/2022     07/12/2022    CO2 26 07/12/2022    BUN 9 07/12/2022    CREATININE 1.05 (H) 07/12/2022    CALCIUM 8.9 07/12/2022    ANIONGAP 14 07/12/2022    EGFRNONAA 61 07/12/2022       Lab Results   Component Value Date    CHOL 161 05/11/2022     Lab Results   Component Value Date    HDL 31 (L) 05/11/2022     Lab Results   Component Value Date    LDLCALC 105 05/11/2022     Lab Results   Component Value Date  "   TRIG 126 05/11/2022     Lab Results   Component Value Date    CHOLHDL 5.2 05/11/2022       Lab Results   Component Value Date    WBC 14.90 (H) 07/12/2022    HGB 15.0 07/12/2022    HCT 44.3 07/12/2022    MCV 93.9 07/12/2022     07/12/2022           Review of Systems   Respiratory:  Positive for shortness of breath. Negative for cough.    Cardiovascular:  Positive for chest pain and leg swelling. Negative for palpitations, orthopnea, claudication and PND.   Neurological:  Positive for loss of consciousness.       Objective:   /76   Pulse 67   Resp 18   Ht 5' 2" (1.575 m)   Wt 82.6 kg (182 lb)   BMI 33.29 kg/m²     Physical Exam  Constitutional:       General: She is not in acute distress.  Eyes:      Extraocular Movements: Extraocular movements intact.   Cardiovascular:      Rate and Rhythm: Normal rate and regular rhythm.      Pulses: Normal pulses.      Heart sounds: Normal heart sounds. No murmur heard.  Pulmonary:      Effort: Pulmonary effort is normal.      Breath sounds: Normal breath sounds.   Abdominal:      Palpations: Abdomen is soft.   Musculoskeletal:         General: No swelling.      Cervical back: Neck supple.   Skin:     Findings: No rash.   Neurological:      Mental Status: She is alert and oriented to person, place, and time.         Assessment:     1. Coronary artery disease involving native coronary artery of native heart with angina pectoris        2. Type 2 diabetes mellitus with hyperglycemia, with long-term current use of insulin                Plan:   Coronary artery disease involving native coronary artery of native heart with angina pectoris  S/p Recent NSTEMI   S/p C with 3VCAD with PCI of LCx/OM2   Residual LAD and RCA disease (LAD - iFR 0.78 and RCA iFR 0.8); Refer to Dr. Espinoza for CABG (LIMA-LAD and RCA) - Prefer medical management given hx of connective tissue disorder  Continue aspirin and Brillinta  metoprolol to 200mg qd and lisinopril 10mg qd  Increase " IMDUR 60mg qd        Type 2 diabetes mellitus  On insulin and farxiga  A1c 8.9   Following with Leslie priest    Smoker  Down to 2-3 cig/day  Counselling done    -- Plan for [facility] to re-evaluate and possible PCI

## 2022-08-31 NOTE — PROGRESS NOTES
PCP: King Griffin MD    Referring Provider:     Subjective:   Cherry Smiley is a 42 y.o. female with hx of htn, diabetes, HLD, chronic pain syndrome, opioid dependence, fibromyalgia, who presents for follow up.   Patient states she has been active.  Patient states has been seen in sleep clinic but has never had sleep study scheduled.     9/1/22 - Patient states she is still having chest pain and more frequent pain in left arm., worse with exertion.  Patient states she has been active.  She states she has been seen in sleep clinic but never scheduled for sleep study.     Dr. Espinoza  8/22/22 -   severe coronary artery disease. She underwent [facility] with PCI of Lcx/OM2 with IZABELA. Patient has complained of intermittent but persistent chest discomfort described as left shoulder and arm discomfort with exercise. In my opinion if bypass surgery were to be considered I would be very interested in the current status of her coronary artery anatomy and specifically has she demonstrated stent patency in the treated region. I would be leery to rush to surgical revascularization in the face of patent stents and no change in the patient's otherwise smooth and chronic 70% lesions as have been outlined above.     Severe circumflex and obtuse marginal stenosis treated with emergent angioplasty and stenting in May 2022. By report the patient has intermittent exercise-induced angina. She has known 70% stenoses which appear chronic to me involving the LAD and right coronary artery distribution.  Plan repeat coronary angiography to assess the patency of the aforementioned stents and the current status of the LAD and right coronary artery lesions. I would like to avoid a surgical revascularization in this young female if clinically appropriate.    6/30/22 - Patient states she still has some chest pain and heart flutters.  She is able to do most of her daily activities.  She is working in garden without significant chest pain, has heart  fluttering.  She reports fatigue with activity.       5/31/22 - Patient states she has fatigue and weakness with walking.  Has walker for ambulation.  Has home health and cardiac therapy.  She had one episode of chest pain associated with stress.  She reports shortness of breath with walking.  She brought in blood pressure log which are controlled.   Pt had two stents placed in her Lcx.  She has triple vessel disease    Fhx:  Family history of heart disease.   Shx: Smoker     EKG 5/11/22 - Sinus rhythm  with PVCs.  Short MN interval.                           Poor R wave progression.  Inferior/lateral ST-T abnormality  may be due to myocardial ischemia.                             Low QRS voltages in precordial leads   ECHO 5/10/22 -  The left ventricle is normal in size with normal systolic function.  EF 60%.                                Normal right ventricular size with normal right ventricular systolic function.                                 Mild tricuspid regurgitation.  The estimated PA systolic pressure is 37 mmHg.  [facility] 5/9/22 -    1.  Three vessel coronary artery disease ( 70% pLAD - iFR 0.78,  99% thrombotic occlusion of OM2, and 70% RCA)  2.  Slightly elevated left sided filling (LVEDP = 15mmHg)  3.  S/P successful bifurcation PCI of LCx/OM2 with IZABELA (ZOILA Vazquez)  Medical management for residual disease; if persistent anginal symptoms will refer for CABG      Arterial duplex 6/6/22 - Mild atherosclerotic disease bilaterally.     Lab Results   Component Value Date     07/12/2022    K 3.9 07/12/2022     07/12/2022    CO2 26 07/12/2022    BUN 9 07/12/2022    CREATININE 1.05 (H) 07/12/2022    CALCIUM 8.9 07/12/2022    ANIONGAP 14 07/12/2022    EGFRNONAA 61 07/12/2022       Lab Results   Component Value Date    CHOL 161 05/11/2022     Lab Results   Component Value Date    HDL 31 (L) 05/11/2022     Lab Results   Component Value Date    LDLCALC 105 05/11/2022     Lab Results   Component Value Date  "   TRIG 126 05/11/2022     Lab Results   Component Value Date    CHOLHDL 5.2 05/11/2022       Lab Results   Component Value Date    WBC 14.90 (H) 07/12/2022    HGB 15.0 07/12/2022    HCT 44.3 07/12/2022    MCV 93.9 07/12/2022     07/12/2022           Review of Systems   Respiratory:  Positive for shortness of breath. Negative for cough.    Cardiovascular:  Positive for chest pain and leg swelling. Negative for palpitations, orthopnea, claudication and PND.   Neurological:  Positive for loss of consciousness.       Objective:   /76   Pulse 67   Resp 18   Ht 5' 2" (1.575 m)   Wt 82.6 kg (182 lb)   BMI 33.29 kg/m²     Physical Exam  Constitutional:       General: She is not in acute distress.  Eyes:      Extraocular Movements: Extraocular movements intact.   Cardiovascular:      Rate and Rhythm: Normal rate and regular rhythm.      Pulses: Normal pulses.      Heart sounds: Normal heart sounds. No murmur heard.  Pulmonary:      Effort: Pulmonary effort is normal.      Breath sounds: Normal breath sounds.   Abdominal:      Palpations: Abdomen is soft.   Musculoskeletal:         General: No swelling.      Cervical back: Neck supple.   Skin:     Findings: No rash.   Neurological:      Mental Status: She is alert and oriented to person, place, and time.         Assessment:     1. Coronary artery disease involving native coronary artery of native heart with angina pectoris        2. Type 2 diabetes mellitus with hyperglycemia, with long-term current use of insulin                Plan:   Coronary artery disease involving native coronary artery of native heart with angina pectoris  S/p Recent NSTEMI   S/p C with 3VCAD with PCI of LCx/OM2   Residual LAD and RCA disease (LAD - iFR 0.78 and RCA iFR 0.8); Refer to Dr. Espinoza for CABG (LIMA-LAD and RCA) - Prefer medical management given hx of connective tissue disorder  Continue aspirin and Brillinta  metoprolol to 200mg qd and lisinopril 10mg qd  Increase " IMDUR 60mg qd        Type 2 diabetes mellitus  On insulin and farxiga  A1c 8.9   Following with Leslie priest    Smoker  Down to 2-3 cig/day  Counselling done    -- Plan for [facility] to re-evaluate and possible PCI

## 2022-09-01 ENCOUNTER — OFFICE VISIT (OUTPATIENT)
Dept: CARDIOLOGY | Facility: CLINIC | Age: 42
End: 2022-09-01
Payer: MEDICARE

## 2022-09-01 VITALS
HEIGHT: 62 IN | DIASTOLIC BLOOD PRESSURE: 76 MMHG | HEART RATE: 67 BPM | RESPIRATION RATE: 18 BRPM | BODY MASS INDEX: 33.49 KG/M2 | WEIGHT: 182 LBS | SYSTOLIC BLOOD PRESSURE: 120 MMHG

## 2022-09-01 DIAGNOSIS — E11.65 TYPE 2 DIABETES MELLITUS WITH HYPERGLYCEMIA, WITH LONG-TERM CURRENT USE OF INSULIN: ICD-10-CM

## 2022-09-01 DIAGNOSIS — Z79.4 TYPE 2 DIABETES MELLITUS WITH HYPERGLYCEMIA, WITH LONG-TERM CURRENT USE OF INSULIN: ICD-10-CM

## 2022-09-01 DIAGNOSIS — I25.119 CORONARY ARTERY DISEASE INVOLVING NATIVE CORONARY ARTERY OF NATIVE HEART WITH ANGINA PECTORIS: ICD-10-CM

## 2022-09-01 PROCEDURE — 99214 OFFICE O/P EST MOD 30 MIN: CPT | Mod: ,,, | Performed by: STUDENT IN AN ORGANIZED HEALTH CARE EDUCATION/TRAINING PROGRAM

## 2022-09-01 PROCEDURE — 99214 PR OFFICE/OUTPT VISIT, EST, LEVL IV, 30-39 MIN: ICD-10-PCS | Mod: ,,, | Performed by: STUDENT IN AN ORGANIZED HEALTH CARE EDUCATION/TRAINING PROGRAM

## 2022-09-01 RX ORDER — ISOSORBIDE MONONITRATE 60 MG/1
60 TABLET, EXTENDED RELEASE ORAL DAILY
Qty: 90 TABLET | Refills: 3 | Status: ON HOLD | OUTPATIENT
Start: 2022-09-01 | End: 2022-09-15 | Stop reason: HOSPADM

## 2022-09-01 NOTE — ASSESSMENT & PLAN NOTE
S/p Recent NSTEMI   S/p [facility] with 3VCAD with PCI of LCx/OM2   Residual LAD and RCA disease (LAD - iFR 0.78 and RCA iFR 0.8); Refer to Dr. Espinoza for CABG (LIMA-LAD and RCA) - Prefer medical management given hx of connective tissue disorder  Continue aspirin and Brillinta  metoprolol to 200mg qd and lisinopril 10mg qd  Increase IMDUR 60mg qd

## 2022-09-03 ENCOUNTER — OUTSIDE PLACE OF SERVICE (OUTPATIENT)
Dept: CARDIOLOGY | Facility: HOSPITAL | Age: 42
End: 2022-09-03
Payer: MEDICARE

## 2022-09-03 PROCEDURE — 93010 PR ELECTROCARDIOGRAM REPORT: ICD-10-PCS | Mod: ,,, | Performed by: INTERNAL MEDICINE

## 2022-09-03 PROCEDURE — 93010 ELECTROCARDIOGRAM REPORT: CPT | Mod: ,,, | Performed by: INTERNAL MEDICINE

## 2022-09-06 ENCOUNTER — TELEPHONE (OUTPATIENT)
Dept: CARDIOLOGY | Facility: CLINIC | Age: 42
End: 2022-09-06
Payer: MEDICARE

## 2022-09-06 NOTE — TELEPHONE ENCOUNTER
Pt. Called stated that she went to ER for chest pain/left arm pain this past weekend stated BP was low at that time. Pt. Stated that she was not admitted but today BP 98\78 felt lightheaded advised to decrease imdur to half dose of what she is taking go to ER for increase chest pain/ SOB or any other problems or concerns per Dr. Dewey. Pt. Voiced understanding.cath R/S FROM 9-19-22 TO 9-15-22. Pt. Agreed.

## 2022-09-07 RX ORDER — ISOSORBIDE MONONITRATE 30 MG/1
30 TABLET, EXTENDED RELEASE ORAL DAILY
COMMUNITY
End: 2022-09-07 | Stop reason: SDUPTHER

## 2022-09-08 RX ORDER — ISOSORBIDE MONONITRATE 30 MG/1
30 TABLET, EXTENDED RELEASE ORAL DAILY
Qty: 30 TABLET | Refills: 4 | Status: SHIPPED | OUTPATIENT
Start: 2022-09-08 | End: 2023-02-06

## 2022-09-09 DIAGNOSIS — Z01.812 PRE-OPERATIVE LABORATORY EXAMINATION: ICD-10-CM

## 2022-09-09 DIAGNOSIS — R07.9 CHEST PAIN, UNSPECIFIED TYPE: Primary | ICD-10-CM

## 2022-09-09 RX ORDER — SODIUM CHLORIDE 0.9 % (FLUSH) 0.9 %
2 SYRINGE (ML) INJECTION
Status: CANCELLED | OUTPATIENT
Start: 2022-09-15

## 2022-09-12 RX ORDER — PREDNISONE 50 MG/1
50 TABLET ORAL SEE ADMIN INSTRUCTIONS
Qty: 3 TABLET | Refills: 0 | Status: SHIPPED | OUTPATIENT
Start: 2022-09-12 | End: 2023-02-01 | Stop reason: ALTCHOICE

## 2022-09-12 RX ORDER — PREDNISONE 50 MG/1
50 TABLET ORAL
COMMUNITY
End: 2022-09-12 | Stop reason: SDUPTHER

## 2022-09-15 ENCOUNTER — HOSPITAL ENCOUNTER (OUTPATIENT)
Facility: HOSPITAL | Age: 42
Discharge: HOME | End: 2022-09-15
Attending: STUDENT IN AN ORGANIZED HEALTH CARE EDUCATION/TRAINING PROGRAM | Admitting: STUDENT IN AN ORGANIZED HEALTH CARE EDUCATION/TRAINING PROGRAM
Payer: MEDICARE

## 2022-09-15 VITALS
OXYGEN SATURATION: 95 % | HEIGHT: 62 IN | SYSTOLIC BLOOD PRESSURE: 108 MMHG | TEMPERATURE: 98 F | BODY MASS INDEX: 33.86 KG/M2 | WEIGHT: 184 LBS | RESPIRATION RATE: 18 BRPM | DIASTOLIC BLOOD PRESSURE: 45 MMHG | HEART RATE: 77 BPM

## 2022-09-15 DIAGNOSIS — R07.9 CHEST PAIN, UNSPECIFIED TYPE: ICD-10-CM

## 2022-09-15 LAB
ANION GAP SERPL CALCULATED.3IONS-SCNC: 14 MMOL/L (ref 7–16)
BASOPHILS # BLD AUTO: 0.03 K/UL (ref 0–0.2)
BASOPHILS NFR BLD AUTO: 0.2 % (ref 0–1)
BUN SERPL-MCNC: 16 MG/DL (ref 7–18)
BUN/CREAT SERPL: 12 (ref 6–20)
CALCIUM SERPL-MCNC: 9.7 MG/DL (ref 8.5–10.1)
CHLORIDE SERPL-SCNC: 97 MMOL/L (ref 98–107)
CO2 SERPL-SCNC: 23 MMOL/L (ref 21–32)
CREAT SERPL-MCNC: 1.32 MG/DL (ref 0.55–1.02)
DIFFERENTIAL METHOD BLD: ABNORMAL
EGFR (NO RACE VARIABLE) (RUSH/TITUS): 51 ML/MIN/1.73M²
EOSINOPHIL # BLD AUTO: 0 K/UL (ref 0–0.5)
EOSINOPHIL NFR BLD AUTO: 0 % (ref 1–4)
ERYTHROCYTE [DISTWIDTH] IN BLOOD BY AUTOMATED COUNT: 14.5 % (ref 11.5–14.5)
GLUCOSE SERPL-MCNC: 212 MG/DL (ref 74–106)
GLUCOSE SERPL-MCNC: 221 MG/DL (ref 70–105)
HCT VFR BLD AUTO: 40.5 % (ref 38–47)
HGB BLD-MCNC: 13.7 G/DL (ref 12–16)
IMM GRANULOCYTES # BLD AUTO: 0.21 K/UL (ref 0–0.04)
IMM GRANULOCYTES NFR BLD: 1.1 % (ref 0–0.4)
LYMPHOCYTES # BLD AUTO: 0.98 K/UL (ref 1–4.8)
LYMPHOCYTES NFR BLD AUTO: 4.9 % (ref 27–41)
LYMPHOCYTES NFR BLD MANUAL: 7 % (ref 27–41)
MCH RBC QN AUTO: 33.3 PG (ref 27–31)
MCHC RBC AUTO-ENTMCNC: 33.8 G/DL (ref 32–36)
MCV RBC AUTO: 98.5 FL (ref 80–96)
MONOCYTES # BLD AUTO: 0.27 K/UL (ref 0–0.8)
MONOCYTES NFR BLD AUTO: 1.4 % (ref 2–6)
MONOCYTES NFR BLD MANUAL: 2 % (ref 2–6)
MPC BLD CALC-MCNC: 8.9 FL (ref 9.4–12.4)
NEUTROPHILS # BLD AUTO: 18.43 K/UL (ref 1.8–7.7)
NEUTROPHILS NFR BLD AUTO: 92.4 % (ref 53–65)
NEUTS BAND NFR BLD MANUAL: 1 % (ref 1–5)
NEUTS SEG NFR BLD MANUAL: 90 % (ref 50–62)
NRBC # BLD AUTO: 0 X10E3/UL
NRBC, AUTO (.00): 0 %
PLATELET # BLD AUTO: 326 K/UL (ref 150–400)
PLATELET MORPHOLOGY: NORMAL
POTASSIUM SERPL-SCNC: 4.3 MMOL/L (ref 3.5–5.1)
RBC # BLD AUTO: 4.11 M/UL (ref 4.2–5.4)
RBC MORPH BLD: NORMAL
SARS-COV-2 RDRP RESP QL NAA+PROBE: NEGATIVE
SODIUM SERPL-SCNC: 130 MMOL/L (ref 136–145)
WBC # BLD AUTO: 19.92 K/UL (ref 4.5–11)

## 2022-09-15 PROCEDURE — 82962 GLUCOSE BLOOD TEST: CPT

## 2022-09-15 PROCEDURE — 87635 SARS-COV-2 COVID-19 AMP PRB: CPT | Performed by: STUDENT IN AN ORGANIZED HEALTH CARE EDUCATION/TRAINING PROGRAM

## 2022-09-15 PROCEDURE — 80048 BASIC METABOLIC PNL TOTAL CA: CPT | Performed by: STUDENT IN AN ORGANIZED HEALTH CARE EDUCATION/TRAINING PROGRAM

## 2022-09-15 PROCEDURE — 93010 EKG 12-LEAD: ICD-10-PCS | Mod: ,,, | Performed by: STUDENT IN AN ORGANIZED HEALTH CARE EDUCATION/TRAINING PROGRAM

## 2022-09-15 PROCEDURE — 94761 N-INVAS EAR/PLS OXIMETRY MLT: CPT | Mod: 59

## 2022-09-15 PROCEDURE — 25500020 PHARM REV CODE 255: Performed by: STUDENT IN AN ORGANIZED HEALTH CARE EDUCATION/TRAINING PROGRAM

## 2022-09-15 PROCEDURE — C1894 INTRO/SHEATH, NON-LASER: HCPCS | Performed by: STUDENT IN AN ORGANIZED HEALTH CARE EDUCATION/TRAINING PROGRAM

## 2022-09-15 PROCEDURE — 93458 L HRT ARTERY/VENTRICLE ANGIO: CPT | Mod: 59 | Performed by: STUDENT IN AN ORGANIZED HEALTH CARE EDUCATION/TRAINING PROGRAM

## 2022-09-15 PROCEDURE — 25000003 PHARM REV CODE 250: Performed by: STUDENT IN AN ORGANIZED HEALTH CARE EDUCATION/TRAINING PROGRAM

## 2022-09-15 PROCEDURE — 99153 MOD SED SAME PHYS/QHP EA: CPT | Performed by: STUDENT IN AN ORGANIZED HEALTH CARE EDUCATION/TRAINING PROGRAM

## 2022-09-15 PROCEDURE — C1725 CATH, TRANSLUMIN NON-LASER: HCPCS | Performed by: STUDENT IN AN ORGANIZED HEALTH CARE EDUCATION/TRAINING PROGRAM

## 2022-09-15 PROCEDURE — 63600175 PHARM REV CODE 636 W HCPCS: Performed by: STUDENT IN AN ORGANIZED HEALTH CARE EDUCATION/TRAINING PROGRAM

## 2022-09-15 PROCEDURE — 92920 PRQ TRLUML C ANGIOP 1ART&/BR: CPT | Mod: LC,,, | Performed by: STUDENT IN AN ORGANIZED HEALTH CARE EDUCATION/TRAINING PROGRAM

## 2022-09-15 PROCEDURE — 92920 PR PTCA: ICD-10-PCS | Mod: LC,,, | Performed by: STUDENT IN AN ORGANIZED HEALTH CARE EDUCATION/TRAINING PROGRAM

## 2022-09-15 PROCEDURE — C1760 CLOSURE DEV, VASC: HCPCS | Performed by: STUDENT IN AN ORGANIZED HEALTH CARE EDUCATION/TRAINING PROGRAM

## 2022-09-15 PROCEDURE — 92920 PRQ TRLUML C ANGIOP 1ART&/BR: CPT | Mod: LC | Performed by: STUDENT IN AN ORGANIZED HEALTH CARE EDUCATION/TRAINING PROGRAM

## 2022-09-15 PROCEDURE — C1769 GUIDE WIRE: HCPCS | Performed by: STUDENT IN AN ORGANIZED HEALTH CARE EDUCATION/TRAINING PROGRAM

## 2022-09-15 PROCEDURE — 27201423 OPTIME MED/SURG SUP & DEVICES STERILE SUPPLY: Performed by: STUDENT IN AN ORGANIZED HEALTH CARE EDUCATION/TRAINING PROGRAM

## 2022-09-15 PROCEDURE — 93458 PR CATH PLACE/CORON ANGIO, IMG SUPER/INTERP,W LEFT HEART VENTRICULOGRAPHY: ICD-10-PCS | Mod: 26,XU,, | Performed by: STUDENT IN AN ORGANIZED HEALTH CARE EDUCATION/TRAINING PROGRAM

## 2022-09-15 PROCEDURE — 93005 ELECTROCARDIOGRAM TRACING: CPT | Mod: 59

## 2022-09-15 PROCEDURE — 85025 COMPLETE CBC W/AUTO DIFF WBC: CPT | Performed by: STUDENT IN AN ORGANIZED HEALTH CARE EDUCATION/TRAINING PROGRAM

## 2022-09-15 PROCEDURE — 99152 MOD SED SAME PHYS/QHP 5/>YRS: CPT | Performed by: STUDENT IN AN ORGANIZED HEALTH CARE EDUCATION/TRAINING PROGRAM

## 2022-09-15 PROCEDURE — 36415 COLL VENOUS BLD VENIPUNCTURE: CPT | Performed by: STUDENT IN AN ORGANIZED HEALTH CARE EDUCATION/TRAINING PROGRAM

## 2022-09-15 PROCEDURE — 93010 ELECTROCARDIOGRAM REPORT: CPT | Mod: ,,, | Performed by: STUDENT IN AN ORGANIZED HEALTH CARE EDUCATION/TRAINING PROGRAM

## 2022-09-15 PROCEDURE — 93458 L HRT ARTERY/VENTRICLE ANGIO: CPT | Mod: 26,XU,, | Performed by: STUDENT IN AN ORGANIZED HEALTH CARE EDUCATION/TRAINING PROGRAM

## 2022-09-15 DEVICE — KIT ANGIO SEAL 6FR VIP: Type: IMPLANTABLE DEVICE | Site: GROIN | Status: FUNCTIONAL

## 2022-09-15 RX ORDER — MIDAZOLAM HYDROCHLORIDE 1 MG/ML
INJECTION INTRAMUSCULAR; INTRAVENOUS
Status: DISCONTINUED | OUTPATIENT
Start: 2022-09-15 | End: 2022-09-15 | Stop reason: HOSPADM

## 2022-09-15 RX ORDER — FENTANYL CITRATE 50 UG/ML
INJECTION, SOLUTION INTRAMUSCULAR; INTRAVENOUS
Status: DISCONTINUED | OUTPATIENT
Start: 2022-09-15 | End: 2022-09-15 | Stop reason: HOSPADM

## 2022-09-15 RX ORDER — NITROGLYCERIN 5 MG/ML
INJECTION, SOLUTION INTRAVENOUS
Status: DISCONTINUED | OUTPATIENT
Start: 2022-09-15 | End: 2022-09-15 | Stop reason: HOSPADM

## 2022-09-15 RX ORDER — SODIUM CHLORIDE 0.9 % (FLUSH) 0.9 %
2 SYRINGE (ML) INJECTION
Status: DISCONTINUED | OUTPATIENT
Start: 2022-09-15 | End: 2022-09-16 | Stop reason: HOSPADM

## 2022-09-15 RX ORDER — ONDANSETRON 4 MG/1
8 TABLET, ORALLY DISINTEGRATING ORAL EVERY 8 HOURS PRN
Status: DISCONTINUED | OUTPATIENT
Start: 2022-09-15 | End: 2022-09-16 | Stop reason: HOSPADM

## 2022-09-15 RX ORDER — LIDOCAINE HYDROCHLORIDE 10 MG/ML
INJECTION INFILTRATION; PERINEURAL
Status: DISCONTINUED | OUTPATIENT
Start: 2022-09-15 | End: 2022-09-15 | Stop reason: HOSPADM

## 2022-09-15 RX ORDER — ACETAMINOPHEN 325 MG/1
650 TABLET ORAL EVERY 4 HOURS PRN
Status: DISCONTINUED | OUTPATIENT
Start: 2022-09-15 | End: 2022-09-16 | Stop reason: HOSPADM

## 2022-09-15 RX ORDER — HEPARIN SOD,PORCINE/0.9 % NACL 1000/500ML
INTRAVENOUS SOLUTION INTRAVENOUS
Status: DISCONTINUED | OUTPATIENT
Start: 2022-09-15 | End: 2022-09-15 | Stop reason: HOSPADM

## 2022-09-15 RX ORDER — HEPARIN SODIUM 1000 [USP'U]/ML
INJECTION, SOLUTION INTRAVENOUS; SUBCUTANEOUS
Status: DISCONTINUED | OUTPATIENT
Start: 2022-09-15 | End: 2022-09-15 | Stop reason: HOSPADM

## 2022-09-15 RX ORDER — SODIUM CHLORIDE 9 MG/ML
INJECTION, SOLUTION INTRAVENOUS
Status: DISCONTINUED | OUTPATIENT
Start: 2022-09-15 | End: 2022-09-16 | Stop reason: HOSPADM

## 2022-09-15 NOTE — LETTER
September 15, 2022         [address] [place] [id number]  Phone: [phone]  Fax: [phone]       Patient: Cherry Smiley   YOB: 1980  Date of Visit: 09/15/2022    To Whom It May Concern:    Reynaldo Smiley  was at [facility] on 09/15/2022. Eliezer  was with pt today at the hospital. If you have any questions or concerns, or if I can be of further assistance, please do not hesitate to contact me.    Sincerely,    Lynn Colorado RN

## 2022-09-15 NOTE — Clinical Note
The catheter was repositioned into the ostium   right coronary artery. An angiography was performed of the right coronary arteries.

## 2022-09-15 NOTE — Clinical Note
90 ml of contrast were injected throughout the case. 110 mL of contrast was the total wasted during the case. 200 mL was the total amount used during the case.

## 2022-09-16 ENCOUNTER — PATIENT MESSAGE (OUTPATIENT)
Dept: CARDIOLOGY | Facility: CLINIC | Age: 42
End: 2022-09-16
Payer: MEDICARE

## 2022-09-16 LAB — POC ACTIVATED CLOTTING TIME K: 220 SEC

## 2022-09-19 ENCOUNTER — TELEPHONE (OUTPATIENT)
Dept: CARDIOLOGY | Facility: CLINIC | Age: 42
End: 2022-09-19
Payer: MEDICARE

## 2022-09-19 RX ORDER — ATORVASTATIN CALCIUM 80 MG/1
80 TABLET, FILM COATED ORAL NIGHTLY
Qty: 90 TABLET | Refills: 1 | Status: SHIPPED | OUTPATIENT
Start: 2022-09-19 | End: 2023-03-27

## 2022-09-19 NOTE — TELEPHONE ENCOUNTER
Pt. Sent message requesting her blood type. Advised pt. To notify blood bank, PCP or surgeon that blood type not found in her records at this clinic. Pt. Voiced understanding.

## 2022-09-28 NOTE — PROGRESS NOTES
Subjective:         Patient ID: Cherry Smiley is a 42 y.o. female.    Chief Complaint: Low-back Pain and Hip Pain      Pain  This is a chronic problem. The current episode started more than 1 year ago. The problem occurs daily. The problem has been unchanged. Associated symptoms include arthralgias and neck pain. Pertinent negatives include no abdominal pain, anorexia, change in bowel habit, chills, coughing, fatigue, fever, rash, sore throat, vertigo or vomiting.   Review of Systems   Constitutional:  Negative for activity change, appetite change, chills, fatigue, fever and unexpected weight change.   HENT:  Negative for drooling, ear discharge, facial swelling, nosebleeds, sore throat, trouble swallowing, voice change and goiter.    Eyes:  Negative for photophobia, pain, discharge, redness and visual disturbance.   Respiratory:  Negative for apnea, cough, choking, chest tightness, shortness of breath, wheezing and stridor.    Cardiovascular:  Negative for palpitations and leg swelling.   Gastrointestinal:  Negative for abdominal distention, abdominal pain, anorexia, change in bowel habit, diarrhea, rectal pain, vomiting, fecal incontinence and change in bowel habit.   Endocrine: Negative for cold intolerance, heat intolerance, polydipsia, polyphagia and polyuria.   Genitourinary:  Negative for flank pain, frequency and hot flashes.   Musculoskeletal:  Positive for arthralgias, back pain, neck pain and neck stiffness.   Integumentary:  Negative for color change, pallor and rash.   Allergic/Immunologic: Negative for immunocompromised state.   Neurological:  Negative for dizziness, vertigo, seizures, syncope, facial asymmetry, speech difficulty, light-headedness, coordination difficulties, memory loss and coordination difficulties.   Hematological:  Negative for adenopathy. Does not bruise/bleed easily.   Psychiatric/Behavioral:  Negative for agitation, behavioral problems, confusion, decreased concentration,  dysphoric mood, hallucinations, self-injury and suicidal ideas. The patient is not nervous/anxious and is not hyperactive.          Past Medical History:   Diagnosis Date    Allergy     Anxiety     Congenital hypothyroidism     Depressive disorder     GERD (gastroesophageal reflux disease)     Hyperlipidemia     Hypertension     Insomnia     Irritable bowel syndrome     Lumbosacral spondylosis     NSTEMI (non-ST elevated myocardial infarction) 05/09/2022    Primary fibromyalgia syndrome     Seizures     Sleep apnea     Type 2 diabetes mellitus      Past Surgical History:   Procedure Laterality Date    ADENOIDECTOMY      ANGIOGRAM, CORONARY, WITH LEFT HEART CATHETERIZATION N/A 5/9/2022    Procedure: Angiogram, Coronary, with Left Heart Cath;  Surgeon: Arthur Dewey MD;  Location: [facility] CATH LAB;  Service: Cardiology;  Laterality: N/A;    EPIDURAL STEROID INJECTION  09/02/2020    C5-6 FABIANO - Dr Venegas    ESOPHAGOGASTRODUODENOSCOPY      HYSTERECTOMY      INJECTION OF FACET JOINT Left 12/27/2018    Left C6-7 Facet Injection - Deb    INJECTION OF FACET JOINT Right 05/19/2019    Right C4-7 Facet Injection X 2 -Deb    INJECTION OF FACET JOINT Left 02/27/2019    Left C4-7 Facet Injection -Deb    knee bilateral      LEFT HEART CATHETERIZATION N/A 9/15/2022    Procedure: Left heart cath;  Surgeon: Arthur Dewey MD;  Location: [facility] CATH LAB;  Service: Cardiology;  Laterality: N/A;    RADIOFREQUENCY THERMOCOAGULATION Right 05/21/2019    Right C4-7 RFTC - Deb    RADIOFREQUENCY THERMOCOAGULATION Left 04/23/2019    Left C4-7 RFTC -Deb    shoulder bilateral      TONSILLECTOMY       Social History     Socioeconomic History    Marital status:    Tobacco Use    Smoking status: Every Day    Smokeless tobacco: Never   Substance and Sexual Activity    Alcohol use: Not Currently    Drug use: Never     Family History   Problem Relation Age of Onset    Stroke Mother     Diabetes Mother     Heart disease Mother      "Hypertension Mother     Hypertension Father     Heart disease Father     Stroke Father     Lupus Father     Cancer Brother     Diabetes Brother     Hypertension Brother      Review of patient's allergies indicates:   Allergen Reactions    Cinnamon analogues Anaphylaxis    Iodine Anaphylaxis    Morpholine analogues Shortness Of Breath    Shellfish containing products Anaphylaxis    Pamelor [nortriptyline]         Objective:  Vitals:    09/29/22 0938 09/29/22 0939   BP: 90/62    Pulse: 74    Weight: 82.1 kg (181 lb)    Height: 5' 2.4" (1.585 m)    PainSc:   5   5         Physical Exam  Vitals and nursing note reviewed. Exam conducted with a chaperone present.   Constitutional:       General: She is awake. She is not in acute distress.     Appearance: Normal appearance. She is not ill-appearing, toxic-appearing or diaphoretic.   HENT:      Head: Normocephalic and atraumatic.      Nose: Nose normal.      Mouth/Throat:      Mouth: Mucous membranes are moist.      Pharynx: Oropharynx is clear.   Eyes:      Conjunctiva/sclera: Conjunctivae normal.      Pupils: Pupils are equal, round, and reactive to light.   Cardiovascular:      Rate and Rhythm: Normal rate.   Pulmonary:      Effort: Pulmonary effort is normal. No respiratory distress.   Abdominal:      Palpations: Abdomen is soft.   Musculoskeletal:         General: Normal range of motion.      Right shoulder: Tenderness present.      Left shoulder: Tenderness present.      Right wrist: Tenderness present.      Left wrist: Tenderness present.      Cervical back: Normal range of motion and neck supple. Tenderness present.      Thoracic back: Tenderness present.      Lumbar back: Tenderness present.   Skin:     General: Skin is warm and dry.   Neurological:      General: No focal deficit present.      Mental Status: She is alert and oriented to person, place, and time. Mental status is at baseline.      Cranial Nerves: No cranial nerve deficit (II-XII).   Psychiatric:    "      Mood and Affect: Mood normal.         Behavior: Behavior normal. Behavior is cooperative.         Thought Content: Thought content normal.         Cardiac catheterization    The Prox RCA to Dist RCA lesion was 70% stenosed.    The RPAV lesion was 90% stenosed.    The RPDA lesion was 80% stenosed.    The 2nd Diag lesion was 85% stenosed.    The Ost LAD to Mid LAD lesion was 70% stenosed.    The Mid Cx to Dist Cx lesion was 95% stenosed with 0% stenosis   post-intervention.    The left ventricular end diastolic pressure was elevated.    The pre-procedure left ventricular end diastolic pressure was 14.    The estimated blood loss was none.    There was three vessel coronary artery disease.    Three vessel CAD (70% proximal LAD - previous iFR positive 0.78, 90%   distal Lcx in-stent restenosis and 70% mid RCA)  Patent stent in the Lcx/OM2 branch  Slightly elevated left sided filling pressure (LVEDP - 14mmHg)  S/P angioplasty of distal Lcx in-stent restenosis with scoring balloon.     Plan:  Will up titrate antianginal therapy  Follow up with Dr. Espinoza as outpatient    The procedure log was documented by Documenter: Luciana Butler RN   and verified by Arthur Dewey MD.    Date: 9/15/2022  Time: 3:06 PM       Admission on 09/15/2022, Discharged on 09/15/2022   Component Date Value Ref Range Status    Sodium 09/15/2022 130 (L)  136 - 145 mmol/L Final    Potassium 09/15/2022 4.3  3.5 - 5.1 mmol/L Final    Chloride 09/15/2022 97 (L)  98 - 107 mmol/L Final    CO2 09/15/2022 23  21 - 32 mmol/L Final    Anion Gap 09/15/2022 14  7 - 16 mmol/L Final    Glucose 09/15/2022 212 (H)  74 - 106 mg/dL Final    BUN 09/15/2022 16  7 - 18 mg/dL Final    Creatinine 09/15/2022 1.32 (H)  0.55 - 1.02 mg/dL Final    BUN/Creatinine Ratio 09/15/2022 12  6 - 20 Final    Calcium 09/15/2022 9.7  8.5 - 10.1 mg/dL Final    eGFR 09/15/2022 51 (L)  >=60 mL/min/1.73m² Final    SARS COV-2 MOLECULAR 09/15/2022 Negative  Negative,  Invalid Final    WBC 09/15/2022 19.92 (H)  4.50 - 11.00 K/uL Final    RBC 09/15/2022 4.11 (L)  4.20 - 5.40 M/uL Final    Hemoglobin 09/15/2022 13.7  12.0 - 16.0 g/dL Final    Hematocrit 09/15/2022 40.5  38.0 - 47.0 % Final    MCV 09/15/2022 98.5 (H)  80.0 - 96.0 fL Final    MCH 09/15/2022 33.3 (H)  27.0 - 31.0 pg Final    MCHC 09/15/2022 33.8  32.0 - 36.0 g/dL Final    RDW 09/15/2022 14.5  11.5 - 14.5 % Final    Platelet Count 09/15/2022 326  150 - 400 K/uL Final    MPV 09/15/2022 8.9 (L)  9.4 - 12.4 fL Final    Neutrophils % 09/15/2022 92.4 (H)  53.0 - 65.0 % Final    Lymphocytes % 09/15/2022 4.9 (L)  27.0 - 41.0 % Final    Monocytes % 09/15/2022 1.4 (L)  2.0 - 6.0 % Final    Eosinophils % 09/15/2022 0.0 (L)  1.0 - 4.0 % Final    Basophils % 09/15/2022 0.2  0.0 - 1.0 % Final    Immature Granulocytes % 09/15/2022 1.1 (H)  0.0 - 0.4 % Final    nRBC, Auto 09/15/2022 0.0  <=0.0 % Final    Neutrophils, Abs 09/15/2022 18.43 (H)  1.80 - 7.70 K/uL Final    Lymphocytes, Absolute 09/15/2022 0.98 (L)  1.00 - 4.80 K/uL Final    Monocytes, Absolute 09/15/2022 0.27  0.00 - 0.80 K/uL Final    Eosinophils, Absolute 09/15/2022 0.00  0.00 - 0.50 K/uL Final    Basophils, Absolute 09/15/2022 0.03  0.00 - 0.20 K/uL Final    Immature Granulocytes, Absolute 09/15/2022 0.21 (H)  0.00 - 0.04 K/uL Final    nRBC, Absolute 09/15/2022 0.00  <=0.00 x10e3/uL Final    Diff Type 09/15/2022 Manual   Final    POC Glucose 09/15/2022 221 (H)  70 - 105 mg/dL Final    Segmented Neutrophils, Man % 09/15/2022 90 (H)  50 - 62 % Final    Bands, Man % 09/15/2022 1  1 - 5 % Final    Lymphocytes, Man % 09/15/2022 7 (L)  27 - 41 % Final    Monocytes, Man % 09/15/2022 2  2 - 6 % Final    Platelet Morphology 09/15/2022 Normal  Normal Final    RBC Morphology 09/15/2022 Normal   Final    POC ACTIVATED CLOTTING TIME K 09/15/2022 220  sec Final   Admission on 07/12/2022, Discharged on 07/12/2022   Component Date Value Ref Range Status    Sodium 07/12/2022 136  136 -  145 mmol/L Final    Potassium 07/12/2022 3.9  3.5 - 5.1 mmol/L Final    Chloride 07/12/2022 100  98 - 107 mmol/L Final    CO2 07/12/2022 26  21 - 32 mmol/L Final    Anion Gap 07/12/2022 14  7 - 16 mmol/L Final    Glucose 07/12/2022 186 (H)  74 - 106 mg/dL Final    BUN 07/12/2022 9  7 - 18 mg/dL Final    Creatinine 07/12/2022 1.05 (H)  0.55 - 1.02 mg/dL Final    BUN/Creatinine Ratio 07/12/2022 9  6 - 20 Final    Calcium 07/12/2022 8.9  8.5 - 10.1 mg/dL Final    Total Protein 07/12/2022 7.5  6.4 - 8.2 g/dL Final    Albumin 07/12/2022 3.6  3.5 - 5.0 g/dL Final    Globulin 07/12/2022 3.9  2.0 - 4.0 g/dL Final    A/G Ratio 07/12/2022 0.9   Final    Bilirubin, Total 07/12/2022 0.3  0.0 - 1.2 mg/dL Final    Alk Phos 07/12/2022 100 (H)  37 - 98 U/L Final    ALT 07/12/2022 27  13 - 56 U/L Final    AST 07/12/2022 10 (L)  15 - 37 U/L Final    eGFR 07/12/2022 61  >=60 mL/min/1.73m² Final    Troponin I High Sensitivity 07/12/2022 13.2  <=60.4 pg/mL Final    ProBNP 07/12/2022 224 (H)  1 - 125 pg/mL Final    WBC 07/12/2022 14.90 (H)  4.50 - 11.00 K/uL Final    RBC 07/12/2022 4.72  4.20 - 5.40 M/uL Final    Hemoglobin 07/12/2022 15.0  12.0 - 16.0 g/dL Final    Hematocrit 07/12/2022 44.3  38.0 - 47.0 % Final    MCV 07/12/2022 93.9  80.0 - 96.0 fL Final    MCH 07/12/2022 31.8 (H)  27.0 - 31.0 pg Final    MCHC 07/12/2022 33.9  32.0 - 36.0 g/dL Final    RDW 07/12/2022 15.7 (H)  11.5 - 14.5 % Final    Platelet Count 07/12/2022 350  150 - 400 K/uL Final    MPV 07/12/2022 9.3 (L)  9.4 - 12.4 fL Final    Neutrophils % 07/12/2022 66.3 (H)  53.0 - 65.0 % Final    Lymphocytes % 07/12/2022 23.8 (L)  27.0 - 41.0 % Final    Monocytes % 07/12/2022 5.6  2.0 - 6.0 % Final    Eosinophils % 07/12/2022 3.3  1.0 - 4.0 % Final    Basophils % 07/12/2022 0.6  0.0 - 1.0 % Final    Immature Granulocytes % 07/12/2022 0.4  0.0 - 0.4 % Final    nRBC, Auto 07/12/2022 0.0  <=0.0 % Final    Neutrophils, Abs 07/12/2022 9.89 (H)  1.80 - 7.70 K/uL Final     Lymphocytes, Absolute 07/12/2022 3.54  1.00 - 4.80 K/uL Final    Monocytes, Absolute 07/12/2022 0.83 (H)  0.00 - 0.80 K/uL Final    Eosinophils, Absolute 07/12/2022 0.49  0.00 - 0.50 K/uL Final    Basophils, Absolute 07/12/2022 0.09  0.00 - 0.20 K/uL Final    Immature Granulocytes, Absolute 07/12/2022 0.06 (H)  0.00 - 0.04 K/uL Final    nRBC, Absolute 07/12/2022 0.00  <=0.00 x10e3/uL Final    Diff Type 07/12/2022 Auto   Final    Troponin I High Sensitivity 07/12/2022 10.7  <=60.4 pg/mL Final   Office Visit on 06/13/2022   Component Date Value Ref Range Status    POC Amphetamines 06/13/2022 Negative  Negative, Inconclusive Final    POC Barbiturates 06/13/2022 Negative  Negative, Inconclusive Final    POC Benzodiazepines 06/13/2022 Negative  Negative, Inconclusive Final    POC Cocaine 06/13/2022 Negative  Negative, Inconclusive Final    POC THC 06/13/2022 Negative  Negative, Inconclusive Final    POC Methadone 06/13/2022 Negative  Negative, Inconclusive Final    POC Methamphetamine 06/13/2022 Negative  Negative, Inconclusive Final    POC Opiates 06/13/2022 Negative  Negative, Inconclusive Final    POC Oxycodone 06/13/2022 Presumptive Positive (A)  Negative, Inconclusive Final    POC Phencyclidine 06/13/2022 Negative  Negative, Inconclusive Final    POC Methylenedioxymethamphetamine * 06/13/2022 Negative  Negative, Inconclusive Final    POC Tricyclic Antidepressants 06/13/2022 Negative  Negative, Inconclusive Final    POC Buprenorphine 06/13/2022 Negative   Final     Acceptable 06/13/2022 Yes   Final    POC Temperature (Urine) 06/13/2022 90   Final   Lab Visit on 05/31/2022   Component Date Value Ref Range Status    Hemoglobin A1C 05/31/2022 8.9 (H)  4.5 - 6.6 % Final    Estimated Average Glucose 05/31/2022 210  mg/dL Final   No results displayed because visit has over 200 results.      Lab Visit on 04/07/2022   Component Date Value Ref Range Status    Nil Result, TB 04/07/2022 0.00   Final    TB1 Ag  minus Nil Result 04/07/2022 0.02   Final    TB2 Ag minus Nil Result 04/07/2022 0.04   Final    Mitogen minus Nil Result, TB 04/07/2022 >10.00   Final    QuantiFERON-Tb Gold Plus 04/07/2022 Negative  Negative Final   Lab Visit on 04/07/2022   Component Date Value Ref Range Status    Hepatitis C Ab 04/07/2022 Reactive (A)  Non-Reactive Final    Hepatitis B Surface Ag 04/07/2022 Non-Reactive  Non-Reactive Final    Hepatitis B Surface Ab 04/07/2022 Non-Reactive  Non-Reactive Final    CCP 04/07/2022 <16.0  <=19.9 units Final    RA Titer 04/07/2022 <10  0 - 15 IU/mL Final    CRP 04/07/2022 1.13 (H)  0.00 - 0.80 mg/dL Final    ESR Westergren 04/07/2022 12  0 - 20 mm/Hr Final    HLA-B27 Result 04/07/2022 Negative  Not Applicable Final    Interpretation 04/07/2022 SEE COMMENTS   Final    HCV RNA Detect/Quant 04/07/2022 Undetected  Undetected IU/mL Final         Orders Placed This Encounter   Procedures    POCT Urine Drug Screen Presump     Interpretive Information:     Negative:  No drug detected at the cut off level.   Positive:  This result represents presumptive positive for the   tested drug, other substances may yield a positive response other   than the analyte of interest. This result should be utilized for   diagnostic purpose only. Confirmation testing will be performed upon physician request only.          Requested Prescriptions     Signed Prescriptions Disp Refills    gabapentin (NEURONTIN) 300 MG capsule 90 capsule 1     Sig: Take 1 capsule (300 mg total) by mouth every 8 (eight) hours.    oxyCODONE-acetaminophen (PERCOCET) 7.5-325 mg per tablet 60 tablet 0     Sig: Take 1 tablet by mouth every 12 (twelve) hours as needed for Pain.    oxyCODONE-acetaminophen (PERCOCET) 7.5-325 mg per tablet 60 tablet 0     Sig: Take 1 tablet by mouth every 12 (twelve) hours as needed for Pain.       Assessment:     1. Rheumatoid arthritis involving multiple sites with positive rheumatoid factor    2. Ankylosing spondylitis of  multiple sites in spine    3. Acute pain of left shoulder    4. Spondylosis without myelopathy or radiculopathy, cervical region    5. Encounter for long-term (current) use of other medications         A's of Opioid Risk Assessment  Activity:Patient can perform ADL.   Analgesia:Patients pain is partially controlled by current medication. Patient has tried OTC medications such as Tylenol and Ibuprofen with out relief.   Adverse Effects: Patient denies constipation or sedation.  Aberrant Behavior:  reviewed with no aberrant drug seeking/taking behavior.  Overdose reversal drug naloxone discussed    Drug screen reviewed      Plan:    Follows rheumatology [facility] rheumatoid arthritis, ankylosing spondylitis     Cardiac bypass surgery [facility] postpone due to small-vessel disease, limited options for bypass     She states current medications helping control her discomfort     Patient verbalized understanding interaction between trazodone in narcotics     Given her history is sleep apnea recommend patient not use trazodone with narcotics she verbalized understanding     She states she would like to continue conservative management from our standpoint     Continue home exercise program as directed     Continue current medication     Follow-up 2 months      Dr. Venegas, September 2022     Bring original prescription medication bottles/container/box with labels to each visit    Pill count    Physical therapy    Massage therapy declines

## 2022-09-29 ENCOUNTER — OFFICE VISIT (OUTPATIENT)
Dept: PAIN MEDICINE | Facility: CLINIC | Age: 42
End: 2022-09-29
Payer: MEDICARE

## 2022-09-29 VITALS
BODY MASS INDEX: 33.31 KG/M2 | SYSTOLIC BLOOD PRESSURE: 90 MMHG | HEIGHT: 62 IN | DIASTOLIC BLOOD PRESSURE: 62 MMHG | WEIGHT: 181 LBS | HEART RATE: 74 BPM

## 2022-09-29 DIAGNOSIS — M45.0 ANKYLOSING SPONDYLITIS OF MULTIPLE SITES IN SPINE: ICD-10-CM

## 2022-09-29 DIAGNOSIS — M05.79 RHEUMATOID ARTHRITIS INVOLVING MULTIPLE SITES WITH POSITIVE RHEUMATOID FACTOR: Primary | Chronic | ICD-10-CM

## 2022-09-29 DIAGNOSIS — M25.512 ACUTE PAIN OF LEFT SHOULDER: ICD-10-CM

## 2022-09-29 DIAGNOSIS — M47.812 SPONDYLOSIS WITHOUT MYELOPATHY OR RADICULOPATHY, CERVICAL REGION: Chronic | ICD-10-CM

## 2022-09-29 DIAGNOSIS — Z79.899 ENCOUNTER FOR LONG-TERM (CURRENT) USE OF OTHER MEDICATIONS: ICD-10-CM

## 2022-09-29 LAB
CTP QC/QA: YES
POC (AMP) AMPHETAMINE: NEGATIVE
POC (BAR) BARBITURATES: NEGATIVE
POC (BUP) BUPRENORPHINE: NEGATIVE
POC (BZO) BENZODIAZEPINES: NEGATIVE
POC (COC) COCAINE: NEGATIVE
POC (MDMA) METHYLENEDIOXYMETHAMPHETAMINE 3,4: NEGATIVE
POC (MET) METHAMPHETAMINE: NEGATIVE
POC (MOP) OPIATES: NEGATIVE
POC (MTD) METHADONE: NEGATIVE
POC (OXY) OXYCODONE: ABNORMAL
POC (PCP) PHENCYCLIDINE: NEGATIVE
POC (TCA) TRICYCLIC ANTIDEPRESSANTS: NEGATIVE
POC TEMPERATURE (URINE): 92
POC THC: NEGATIVE

## 2022-09-29 PROCEDURE — 99214 OFFICE O/P EST MOD 30 MIN: CPT | Mod: S$PBB,,, | Performed by: PHYSICIAN ASSISTANT

## 2022-09-29 PROCEDURE — 99214 PR OFFICE/OUTPT VISIT, EST, LEVL IV, 30-39 MIN: ICD-10-PCS | Mod: S$PBB,,, | Performed by: PHYSICIAN ASSISTANT

## 2022-09-29 PROCEDURE — 80305 DRUG TEST PRSMV DIR OPT OBS: CPT | Mod: PBBFAC | Performed by: PHYSICIAN ASSISTANT

## 2022-09-29 PROCEDURE — 99214 OFFICE O/P EST MOD 30 MIN: CPT | Mod: PBBFAC | Performed by: PHYSICIAN ASSISTANT

## 2022-09-29 RX ORDER — OXYCODONE AND ACETAMINOPHEN 7.5; 325 MG/1; MG/1
1 TABLET ORAL EVERY 12 HOURS PRN
Qty: 60 TABLET | Refills: 0 | Status: SHIPPED | OUTPATIENT
Start: 2022-10-12 | End: 2022-11-11

## 2022-09-29 RX ORDER — GABAPENTIN 300 MG/1
300 CAPSULE ORAL EVERY 8 HOURS
Qty: 90 CAPSULE | Refills: 1 | Status: SHIPPED | OUTPATIENT
Start: 2022-09-29 | End: 2023-01-30 | Stop reason: SDUPTHER

## 2022-09-29 RX ORDER — OXYCODONE AND ACETAMINOPHEN 7.5; 325 MG/1; MG/1
1 TABLET ORAL EVERY 12 HOURS PRN
Qty: 60 TABLET | Refills: 0 | Status: SHIPPED | OUTPATIENT
Start: 2022-11-11 | End: 2022-12-11

## 2022-11-02 ENCOUNTER — PATIENT MESSAGE (OUTPATIENT)
Dept: PAIN MEDICINE | Facility: CLINIC | Age: 42
End: 2022-11-02
Payer: MEDICARE

## 2022-11-06 ENCOUNTER — OUTSIDE PLACE OF SERVICE (OUTPATIENT)
Dept: CARDIOLOGY | Facility: HOSPITAL | Age: 42
End: 2022-11-06
Payer: MEDICARE

## 2022-11-06 PROCEDURE — 93010 PR ELECTROCARDIOGRAM REPORT: ICD-10-PCS | Mod: ,,, | Performed by: INTERNAL MEDICINE

## 2022-11-06 PROCEDURE — 93010 ELECTROCARDIOGRAM REPORT: CPT | Mod: ,,, | Performed by: INTERNAL MEDICINE

## 2022-11-08 ENCOUNTER — PATIENT MESSAGE (OUTPATIENT)
Dept: PAIN MEDICINE | Facility: CLINIC | Age: 42
End: 2022-11-08
Payer: MEDICARE

## 2022-11-28 ENCOUNTER — OFFICE VISIT (OUTPATIENT)
Dept: PAIN MEDICINE | Facility: CLINIC | Age: 42
End: 2022-11-28
Payer: MEDICARE

## 2022-11-28 VITALS
DIASTOLIC BLOOD PRESSURE: 64 MMHG | BODY MASS INDEX: 34.04 KG/M2 | WEIGHT: 185 LBS | HEIGHT: 62 IN | SYSTOLIC BLOOD PRESSURE: 112 MMHG | HEART RATE: 73 BPM

## 2022-11-28 DIAGNOSIS — G89.29 CHRONIC LEFT SHOULDER PAIN: Chronic | ICD-10-CM

## 2022-11-28 DIAGNOSIS — G89.4 CHRONIC PAIN SYNDROME: Chronic | ICD-10-CM

## 2022-11-28 DIAGNOSIS — M05.79 RHEUMATOID ARTHRITIS INVOLVING MULTIPLE SITES WITH POSITIVE RHEUMATOID FACTOR: Chronic | ICD-10-CM

## 2022-11-28 DIAGNOSIS — M45.0 ANKYLOSING SPONDYLITIS OF MULTIPLE SITES IN SPINE: Primary | Chronic | ICD-10-CM

## 2022-11-28 DIAGNOSIS — M25.512 CHRONIC LEFT SHOULDER PAIN: Chronic | ICD-10-CM

## 2022-11-28 DIAGNOSIS — M47.817 SPONDYLOSIS OF LUMBOSACRAL REGION WITHOUT MYELOPATHY OR RADICULOPATHY: Chronic | ICD-10-CM

## 2022-11-28 PROCEDURE — 99214 OFFICE O/P EST MOD 30 MIN: CPT | Mod: PBBFAC | Performed by: PAIN MEDICINE

## 2022-11-28 PROCEDURE — 99214 OFFICE O/P EST MOD 30 MIN: CPT | Mod: S$PBB,,, | Performed by: PAIN MEDICINE

## 2022-11-28 PROCEDURE — 99214 PR OFFICE/OUTPT VISIT, EST, LEVL IV, 30-39 MIN: ICD-10-PCS | Mod: S$PBB,,, | Performed by: PAIN MEDICINE

## 2022-11-28 RX ORDER — RANOLAZINE 500 MG/1
500 TABLET, EXTENDED RELEASE ORAL
COMMUNITY
End: 2023-10-03

## 2022-11-28 RX ORDER — OXYCODONE AND ACETAMINOPHEN 7.5; 325 MG/1; MG/1
1 TABLET ORAL EVERY 12 HOURS PRN
Qty: 60 TABLET | Refills: 0 | Status: SHIPPED | OUTPATIENT
Start: 2022-12-11 | End: 2023-01-10

## 2022-11-28 RX ORDER — OXYCODONE AND ACETAMINOPHEN 7.5; 325 MG/1; MG/1
1 TABLET ORAL EVERY 12 HOURS PRN
Qty: 60 TABLET | Refills: 0 | Status: SHIPPED | OUTPATIENT
Start: 2023-01-10 | End: 2023-01-30 | Stop reason: SDUPTHER

## 2022-11-28 NOTE — PROGRESS NOTES
She Disclaimer: This note has been generated using voice-recognition software. There may be typographical errors that have been missed during proof-reading        Patient ID: Cherry Smiley is a 42 y.o. female.      Chief Complaint: Hip Pain, Leg Pain, Knee Pain, and Shoulder Pain      42-year-old female returns for re-evaluation of intractable right hip, shoulder,  lower extremity and knee pain.  Patient has multiple comorbidities and is not a candidate for nerve block injections.  She has significant coronary artery disease, s/p  6 coronary stents  and receives  on Brilinta.  Most recent stent placement was November 8, 2022 at [facility].  She also has a history of diabetes and seizure disorder.  She has a pending appointment with a neurologist in [place], January 2023.  She was unable to tolerate physical therapy due to pain exacerbation.  She has remained on opiates with tolerable pain relief.  She returns today for medication refill.  She denies any new complaints.        Pain Assessment  Pain Assessment: 0-10  Pain Score:   6  Pain Location: Other (Comment) (right hip, leg, knee and shoulder)  Pain Orientation: Right  Pain Descriptors: Constant, Sharp, Aching  Pain Frequency: Continuous  Pain Onset: Awakened from sleep  Clinical Progression: Gradually worsening  Aggravating Factors: Standing, Walking, Other (Comment) (sitting and lying)  Pain Intervention(s): Medication (See eMAR), Heat applied, Cold applied      A's of Opioid Risk Assessment  Activity:Patient can perform ADL.   Analgesia:Patients pain is  controlled by current medication.   Adverse Effects: Patient denies constipation or sedation.  Aberrant Behavior:  reviewed with no aberrant drug seeking/taking behavior.      Patient denies any suicidal or homicidal ideations    Physical Therapy/Home Exercise: yes      Cardiac catheterization    The Prox RCA to Dist RCA lesion was 70% stenosed.    The RPAV lesion was 90% stenosed.    The  RPDA lesion was 80% stenosed.    The 2nd Diag lesion was 85% stenosed.    The Ost LAD to Mid LAD lesion was 70% stenosed.    The Mid Cx to Dist Cx lesion was 95% stenosed with 0% stenosis   post-intervention.    The left ventricular end diastolic pressure was elevated.    The pre-procedure left ventricular end diastolic pressure was 14.    The estimated blood loss was none.    There was three vessel coronary artery disease.    Three vessel CAD (70% proximal LAD - previous iFR positive 0.78, 90%   distal Lcx in-stent restenosis and 70% mid RCA)  Patent stent in the Lcx/OM2 branch  Slightly elevated left sided filling pressure (LVEDP - 14mmHg)  S/P angioplasty of distal Lcx in-stent restenosis with scoring balloon.     Plan:  Will up titrate antianginal therapy  Follow up with Dr. Espinoza as outpatient    The procedure log was documented by Documenter: Luciana Butler RN   and verified by Arthur Dewey MD.    Date: 9/15/2022  Time: 3:06 PM      Review of Systems   Constitutional: Negative.    HENT: Negative.     Eyes: Negative.    Respiratory: Negative.     Cardiovascular:  Positive for chest pain.   Gastrointestinal: Negative.    Endocrine: Negative.    Genitourinary: Negative.    Musculoskeletal:  Positive for arthralgias (Right hip, knee and shoulder), back pain and gait problem.   Integumentary:  Negative.   Hematological: Negative.    Psychiatric/Behavioral: Negative.             Past Medical History:   Diagnosis Date    Allergy     Anxiety     Congenital hypothyroidism     Depressive disorder     GERD (gastroesophageal reflux disease)     Hyperlipidemia     Hypertension     Insomnia     Irritable bowel syndrome     Lumbosacral spondylosis     NSTEMI (non-ST elevated myocardial infarction) 05/09/2022    Primary fibromyalgia syndrome     Seizures     Sleep apnea     Type 2 diabetes mellitus      Past Surgical History:   Procedure Laterality Date    ADENOIDECTOMY      ANGIOGRAM, CORONARY, WITH LEFT  HEART CATHETERIZATION N/A 5/9/2022    Procedure: Angiogram, Coronary, with Left Heart Cath;  Surgeon: Arthur Dewey MD;  Location: [facility] CATH LAB;  Service: Cardiology;  Laterality: N/A;    EPIDURAL STEROID INJECTION  09/02/2020    C5-6 [facility] - Dr Venegas    ESOPHAGOGASTRODUODENOSCOPY      HYSTERECTOMY      INJECTION OF FACET JOINT Left 12/27/2018    Left C6-7 Facet Injection - Deb    INJECTION OF FACET JOINT Right 05/19/2019    Right C4-7 Facet Injection X 2 -Deb    INJECTION OF FACET JOINT Left 02/27/2019    Left C4-7 Facet Injection -Deb    knee bilateral      LEFT HEART CATHETERIZATION N/A 9/15/2022    Procedure: Left heart cath;  Surgeon: Arthur Dewey MD;  Location: [facility] CATH LAB;  Service: Cardiology;  Laterality: N/A;    RADIOFREQUENCY THERMOCOAGULATION Right 05/21/2019    Right C4-7 RFTC - Deb    RADIOFREQUENCY THERMOCOAGULATION Left 04/23/2019    Left C4-7 RFTC -Deb    shoulder bilateral      TONSILLECTOMY       Social History     Socioeconomic History    Marital status:    Tobacco Use    Smoking status: Every Day    Smokeless tobacco: Never   Substance and Sexual Activity    Alcohol use: Not Currently    Drug use: Never     Family History   Problem Relation Age of Onset    Stroke Mother     Diabetes Mother     Heart disease Mother     Hypertension Mother     Hypertension Father     Heart disease Father     Stroke Father     Lupus Father     Cancer Brother     Diabetes Brother     Hypertension Brother      Review of patient's allergies indicates:   Allergen Reactions    Cinnamon analogues Anaphylaxis    Iodine Anaphylaxis    Morpholine analogues Shortness Of Breath    Shellfish containing products Anaphylaxis    Pamelor [nortriptyline]      has a current medication list which includes the following prescription(s): aspirin, atorvastatin, dapagliflozin-metformin, folic acid, gabapentin, insulin detemir u-100, insulin nph-insulin regular (70/30), isosorbide mononitrate,  levetiracetam, lisinopril, methotrexate, metoprolol succinate, oxycodone-acetaminophen, prednisone, ranolazine, ticagrelor, [START ON 12/11/2022] oxycodone-acetaminophen, and [START ON 1/10/2023] oxycodone-acetaminophen.      Objective:  Vitals:    11/28/22 1428   BP: 112/64   Pulse: 73        Physical Exam  Vitals and nursing note reviewed.   Constitutional:       General: She is not in acute distress.     Appearance: Normal appearance. She is not ill-appearing, toxic-appearing or diaphoretic.   HENT:      Head: Normocephalic and atraumatic.      Nose: Nose normal.      Mouth/Throat:      Mouth: Mucous membranes are moist.   Eyes:      Extraocular Movements: Extraocular movements intact.      Pupils: Pupils are equal, round, and reactive to light.   Cardiovascular:      Rate and Rhythm: Normal rate and regular rhythm.      Heart sounds: Normal heart sounds.   Pulmonary:      Effort: Pulmonary effort is normal. No respiratory distress.      Breath sounds: Normal breath sounds. No stridor. No wheezing or rhonchi.   Abdominal:      General: Bowel sounds are normal.      Palpations: Abdomen is soft.   Musculoskeletal:         General: No swelling or deformity.      Right shoulder: Tenderness and bony tenderness present.      Cervical back: Normal and normal range of motion. No spasms or tenderness. No pain with movement. Normal range of motion.      Thoracic back: Normal.      Lumbar back: Tenderness and bony tenderness present. No spasms. Normal range of motion. Negative right straight leg raise test and negative left straight leg raise test. No scoliosis.      Right hip: Tenderness and bony tenderness present. Decreased range of motion.      Right lower leg: No edema.      Left lower leg: No edema.   Skin:     General: Skin is warm.   Neurological:      General: No focal deficit present.      Mental Status: She is alert and oriented to person, place, and time. Mental status is at baseline.      Cranial Nerves: No  cranial nerve deficit.      Sensory: Sensation is intact. No sensory deficit.      Motor: No weakness.      Coordination: Coordination normal.      Gait: Gait normal.      Deep Tendon Reflexes: Reflexes are normal and symmetric.   Psychiatric:         Mood and Affect: Mood normal.         Behavior: Behavior normal.         Assessment:      1. Ankylosing spondylitis of multiple sites in spine    2. Rheumatoid arthritis involving multiple sites with positive rheumatoid factor    3. Chronic left shoulder pain    4. Spondylosis of lumbosacral region without myelopathy or radiculopathy    5. Chronic pain syndrome          Plan:  1. reviewed  2.Addiction, Dependency, Tolerance, Opioid abuse-misuse, Death, Diversion Discussed. Overdose reversal drug Naloxone discussed.  3.Refill/Continue medications for pain control and function       Requested Prescriptions     Signed Prescriptions Disp Refills    oxyCODONE-acetaminophen (PERCOCET) 7.5-325 mg per tablet 60 tablet 0     Sig: Take 1 tablet by mouth every 12 (twelve) hours as needed for Pain.    oxyCODONE-acetaminophen (PERCOCET) 7.5-325 mg per tablet 60 tablet 0     Sig: Take 1 tablet by mouth every 12 (twelve) hours as needed for Pain.     4.Patient defers nerve block injections, physical therapy or surgical consultation       5.Follow with [facility] [place] in 2 months for re-evaluation and medication refill       report:  Reviewed and consistent with medication use as prescribed.      The total time spent for evaluation and management on 12/01/2022 including reviewing separately obtained history, performing a medically appropriate exam and evaluation, documenting clinical information in the health record, independently interpreting results and communicating them to the patient/family/caregiver, and ordering medications/tests/procedures was between 15-29 minutes.    The above plan and management options were discussed at length with patient. Patient is in agreement with  the above and verbalized understanding. It will be communicated with the referring physician via electronic record, fax, or mail.

## 2023-01-30 ENCOUNTER — OUTSIDE PLACE OF SERVICE (OUTPATIENT)
Dept: CARDIOLOGY | Facility: HOSPITAL | Age: 43
End: 2023-01-30
Payer: MEDICARE

## 2023-01-30 PROCEDURE — 93010 ELECTROCARDIOGRAM REPORT: CPT | Mod: ,,, | Performed by: STUDENT IN AN ORGANIZED HEALTH CARE EDUCATION/TRAINING PROGRAM

## 2023-01-30 PROCEDURE — 93010 PR ELECTROCARDIOGRAM REPORT: ICD-10-PCS | Mod: ,,, | Performed by: STUDENT IN AN ORGANIZED HEALTH CARE EDUCATION/TRAINING PROGRAM

## 2023-01-30 NOTE — PROGRESS NOTES
Subjective:         Patient ID: Cherry Smiley is a 42 y.o. female.    Chief Complaint: Low-back Pain and Abdominal Pain        Pain  This is a chronic problem. The current episode started more than 1 year ago. The problem occurs daily. The problem has been unchanged. Associated symptoms include arthralgias and neck pain. Pertinent negatives include no anorexia, chest pain, chills, coughing, diaphoresis, fatigue, fever, sore throat, vertigo or vomiting.   Review of Systems   Constitutional:  Negative for activity change, appetite change, chills, diaphoresis, fatigue, fever and unexpected weight change.   HENT:  Negative for drooling, ear discharge, facial swelling, nosebleeds, sore throat, trouble swallowing, voice change and goiter.    Eyes:  Negative for photophobia, pain, discharge, redness and visual disturbance.   Respiratory:  Negative for apnea, cough, choking, chest tightness, shortness of breath, wheezing and stridor.    Cardiovascular:  Negative for chest pain, palpitations and leg swelling.   Gastrointestinal:  Negative for abdominal distention, anorexia, diarrhea, rectal pain, vomiting and fecal incontinence.   Endocrine: Negative for cold intolerance, heat intolerance, polydipsia, polyphagia and polyuria.   Genitourinary:  Negative for flank pain, frequency and hot flashes.   Musculoskeletal:  Positive for arthralgias, back pain, neck pain and neck stiffness.   Integumentary:  Negative for color change and pallor.   Allergic/Immunologic: Negative for immunocompromised state.   Neurological:  Negative for dizziness, vertigo, seizures, syncope, facial asymmetry, speech difficulty, light-headedness, coordination difficulties, memory loss and coordination difficulties.   Hematological:  Negative for adenopathy. Does not bruise/bleed easily.   Psychiatric/Behavioral:  Negative for agitation, behavioral problems, confusion, decreased concentration, dysphoric mood, hallucinations, self-injury and suicidal  ideas. The patient is not nervous/anxious and is not hyperactive.          Past Medical History:   Diagnosis Date    Allergy     Anxiety     Congenital hypothyroidism     Depressive disorder     GERD (gastroesophageal reflux disease)     Hyperlipidemia     Hypertension     Insomnia     Irritable bowel syndrome     Lumbosacral spondylosis     NSTEMI (non-ST elevated myocardial infarction) 05/09/2022    Primary fibromyalgia syndrome     Seizures     Sleep apnea     Type 2 diabetes mellitus      Past Surgical History:   Procedure Laterality Date    ADENOIDECTOMY      ANGIOGRAM, CORONARY, WITH LEFT HEART CATHETERIZATION N/A 5/9/2022    Procedure: Angiogram, Coronary, with Left Heart Cath;  Surgeon: Arthur Dewey MD;  Location: [facility] CATH LAB;  Service: Cardiology;  Laterality: N/A;    EPIDURAL STEROID INJECTION  09/02/2020    C5-6 [facility] - Dr Venegas    ESOPHAGOGASTRODUODENOSCOPY      HYSTERECTOMY      INJECTION OF FACET JOINT Left 12/27/2018    Left C6-7 Facet Injection - Deb    INJECTION OF FACET JOINT Right 05/19/2019    Right C4-7 Facet Injection X 2 -Deb    INJECTION OF FACET JOINT Left 02/27/2019    Left C4-7 Facet Injection -Deb    knee bilateral      LEFT HEART CATHETERIZATION N/A 9/15/2022    Procedure: Left heart cath;  Surgeon: rAthur Dewey MD;  Location: [facility] CATH LAB;  Service: Cardiology;  Laterality: N/A;    RADIOFREQUENCY THERMOCOAGULATION Right 05/21/2019    Right C4-7 RFTC - Deb    RADIOFREQUENCY THERMOCOAGULATION Left 04/23/2019    Left C4-7 RFTC -Deb    shoulder bilateral      TONSILLECTOMY       Social History     Socioeconomic History    Marital status:    Tobacco Use    Smoking status: Every Day    Smokeless tobacco: Never   Substance and Sexual Activity    Alcohol use: Not Currently    Drug use: Never     Family History   Problem Relation Age of Onset    Stroke Mother     Diabetes Mother     Heart disease Mother     Hypertension Mother     Hypertension Father     Heart  "disease Father     Stroke Father     Lupus Father     Cancer Brother     Diabetes Brother     Hypertension Brother      Review of patient's allergies indicates:   Allergen Reactions    Cinnamon analogues Anaphylaxis    Iodine Anaphylaxis    Morpholine analogues Shortness Of Breath    Shellfish containing products Anaphylaxis    Pamelor [nortriptyline]         Objective:  Vitals:    01/31/23 1006   BP: 116/75   Pulse: 80   Resp: 18   Weight: 78 kg (172 lb)   Height: 5' 2" (1.575 m)   PainSc:   9         Physical Exam  Vitals and nursing note reviewed. Exam conducted with a chaperone present.   Constitutional:       General: She is awake. She is not in acute distress.     Appearance: Normal appearance. She is not ill-appearing, toxic-appearing or diaphoretic.   HENT:      Head: Normocephalic and atraumatic.      Nose: Nose normal.      Mouth/Throat:      Mouth: Mucous membranes are moist.      Pharynx: Oropharynx is clear.   Eyes:      Conjunctiva/sclera: Conjunctivae normal.      Pupils: Pupils are equal, round, and reactive to light.   Cardiovascular:      Rate and Rhythm: Normal rate.   Pulmonary:      Effort: Pulmonary effort is normal. No respiratory distress.   Abdominal:      Palpations: Abdomen is soft.   Musculoskeletal:         General: Normal range of motion.      Right shoulder: Tenderness present.      Left shoulder: Tenderness present.      Right wrist: Tenderness present.      Left wrist: Tenderness present.      Cervical back: Normal range of motion and neck supple. Tenderness present.      Thoracic back: Tenderness present.      Lumbar back: Tenderness present.   Skin:     General: Skin is warm and dry.   Neurological:      General: No focal deficit present.      Mental Status: She is alert and oriented to person, place, and time. Mental status is at baseline.      Cranial Nerves: No cranial nerve deficit (II-XII).   Psychiatric:         Mood and Affect: Mood normal.         Behavior: Behavior normal. " Behavior is cooperative.         Thought Content: Thought content normal.         Cardiac catheterization    The Prox RCA to Dist RCA lesion was 70% stenosed.    The RPAV lesion was 90% stenosed.    The RPDA lesion was 80% stenosed.    The 2nd Diag lesion was 85% stenosed.    The Ost LAD to Mid LAD lesion was 70% stenosed.    The Mid Cx to Dist Cx lesion was 95% stenosed with 0% stenosis   post-intervention.    The left ventricular end diastolic pressure was elevated.    The pre-procedure left ventricular end diastolic pressure was 14.    The estimated blood loss was none.    There was three vessel coronary artery disease.    Three vessel CAD (70% proximal LAD - previous iFR positive 0.78, 90%   distal Lcx in-stent restenosis and 70% mid RCA)  Patent stent in the Lcx/OM2 branch  Slightly elevated left sided filling pressure (LVEDP - 14mmHg)  S/P angioplasty of distal Lcx in-stent restenosis with scoring balloon.     Plan:  Will up titrate antianginal therapy  Follow up with Dr. Espinoza as outpatient    The procedure log was documented by Documenter: Luciana Butler RN   and verified by Arthur Dewey MD.    Date: 9/15/2022  Time: 3:06 PM         Office Visit on 09/29/2022   Component Date Value Ref Range Status    POC Amphetamines 09/29/2022 Negative  Negative, Inconclusive Final    POC Barbiturates 09/29/2022 Negative  Negative, Inconclusive Final    POC Benzodiazepines 09/29/2022 Negative  Negative, Inconclusive Final    POC Cocaine 09/29/2022 Negative  Negative, Inconclusive Final    POC THC 09/29/2022 Negative  Negative, Inconclusive Final    POC Methadone 09/29/2022 Negative  Negative, Inconclusive Final    POC Methamphetamine 09/29/2022 Negative  Negative, Inconclusive Final    POC Opiates 09/29/2022 Negative  Negative, Inconclusive Final    POC Oxycodone 09/29/2022 Presumptive Positive (A)  Negative, Inconclusive Final    POC Phencyclidine 09/29/2022 Negative  Negative, Inconclusive Final    POC  Methylenedioxymethamphetamine * 09/29/2022 Negative  Negative, Inconclusive Final    POC Tricyclic Antidepressants 09/29/2022 Negative  Negative, Inconclusive Final    POC Buprenorphine 09/29/2022 Negative   Final     Acceptable 09/29/2022 Yes   Final    POC Temperature (Urine) 09/29/2022 92   Final   Admission on 09/15/2022, Discharged on 09/15/2022   Component Date Value Ref Range Status    Sodium 09/15/2022 130 (L)  136 - 145 mmol/L Final    Potassium 09/15/2022 4.3  3.5 - 5.1 mmol/L Final    Chloride 09/15/2022 97 (L)  98 - 107 mmol/L Final    CO2 09/15/2022 23  21 - 32 mmol/L Final    Anion Gap 09/15/2022 14  7 - 16 mmol/L Final    Glucose 09/15/2022 212 (H)  74 - 106 mg/dL Final    BUN 09/15/2022 16  7 - 18 mg/dL Final    Creatinine 09/15/2022 1.32 (H)  0.55 - 1.02 mg/dL Final    BUN/Creatinine Ratio 09/15/2022 12  6 - 20 Final    Calcium 09/15/2022 9.7  8.5 - 10.1 mg/dL Final    eGFR 09/15/2022 51 (L)  >=60 mL/min/1.73m² Final    SARS COV-2 MOLECULAR 09/15/2022 Negative  Negative, Invalid Final    WBC 09/15/2022 19.92 (H)  4.50 - 11.00 K/uL Final    RBC 09/15/2022 4.11 (L)  4.20 - 5.40 M/uL Final    Hemoglobin 09/15/2022 13.7  12.0 - 16.0 g/dL Final    Hematocrit 09/15/2022 40.5  38.0 - 47.0 % Final    MCV 09/15/2022 98.5 (H)  80.0 - 96.0 fL Final    MCH 09/15/2022 33.3 (H)  27.0 - 31.0 pg Final    MCHC 09/15/2022 33.8  32.0 - 36.0 g/dL Final    RDW 09/15/2022 14.5  11.5 - 14.5 % Final    Platelet Count 09/15/2022 326  150 - 400 K/uL Final    MPV 09/15/2022 8.9 (L)  9.4 - 12.4 fL Final    Neutrophils % 09/15/2022 92.4 (H)  53.0 - 65.0 % Final    Lymphocytes % 09/15/2022 4.9 (L)  27.0 - 41.0 % Final    Monocytes % 09/15/2022 1.4 (L)  2.0 - 6.0 % Final    Eosinophils % 09/15/2022 0.0 (L)  1.0 - 4.0 % Final    Basophils % 09/15/2022 0.2  0.0 - 1.0 % Final    Immature Granulocytes % 09/15/2022 1.1 (H)  0.0 - 0.4 % Final    nRBC, Auto 09/15/2022 0.0  <=0.0 % Final    Neutrophils, Abs 09/15/2022  18.43 (H)  1.80 - 7.70 K/uL Final    Lymphocytes, Absolute 09/15/2022 0.98 (L)  1.00 - 4.80 K/uL Final    Monocytes, Absolute 09/15/2022 0.27  0.00 - 0.80 K/uL Final    Eosinophils, Absolute 09/15/2022 0.00  0.00 - 0.50 K/uL Final    Basophils, Absolute 09/15/2022 0.03  0.00 - 0.20 K/uL Final    Immature Granulocytes, Absolute 09/15/2022 0.21 (H)  0.00 - 0.04 K/uL Final    nRBC, Absolute 09/15/2022 0.00  <=0.00 x10e3/uL Final    Diff Type 09/15/2022 Manual   Final    POC Glucose 09/15/2022 221 (H)  70 - 105 mg/dL Final    Segmented Neutrophils, Man % 09/15/2022 90 (H)  50 - 62 % Final    Bands, Man % 09/15/2022 1  1 - 5 % Final    Lymphocytes, Man % 09/15/2022 7 (L)  27 - 41 % Final    Monocytes, Man % 09/15/2022 2  2 - 6 % Final    Platelet Morphology 09/15/2022 Normal  Normal Final    RBC Morphology 09/15/2022 Normal   Final    POC ACTIVATED CLOTTING TIME K 09/15/2022 220  sec Final         Orders Placed This Encounter   Procedures    POCT Urine Drug Screen Presump     Interpretive Information:     Negative:  No drug detected at the cut off level.   Positive:  This result represents presumptive positive for the   tested drug, other substances may yield a positive response other   than the analyte of interest. This result should be utilized for   diagnostic purpose only. Confirmation testing will be performed upon physician request only.          Requested Prescriptions     Signed Prescriptions Disp Refills    gabapentin (NEURONTIN) 300 MG capsule 90 capsule 1     Sig: Take 1 capsule (300 mg total) by mouth every 8 (eight) hours.    naloxone (NARCAN) 4 mg/actuation Spry 2 each 0     Si sprays (8 mg total) by Nasal route once. Call 911, Two sprays alternate nostril, may repeat 2-3 minutes as needed for 1 dose    oxyCODONE-acetaminophen (PERCOCET) 7.5-325 mg per tablet 60 tablet 0     Sig: Take 1 tablet by mouth every 12 (twelve) hours as needed for Pain.    oxyCODONE-acetaminophen (PERCOCET) 7.5-325 mg per  tablet 60 tablet 0     Sig: Take 1 tablet by mouth every 12 (twelve) hours as needed for Pain.       Assessment:     1. Ankylosing spondylitis of multiple sites in spine    2. Rheumatoid arthritis involving multiple sites with positive rheumatoid factor    3. Encounter for long-term (current) use of other medications         A's of Opioid Risk Assessment  Activity:Patient can perform ADL.   Analgesia:Patients pain is partially controlled by current medication. Patient has tried OTC medications such as Tylenol and Ibuprofen with out relief.   Adverse Effects: Patient denies constipation or sedation.  Aberrant Behavior:  reviewed with no aberrant drug seeking/taking behavior.  Overdose reversal drug naloxone discussed    Drug screen reviewed      Plan:    Narcan January 2023    Follows rheumatology [facility] rheumatoid arthritis, ankylosing spondylitis     Cardiac bypass surgery [facility] postpone due to small-vessel disease, limited options for bypass     Anticoagulated after myocardial infarction    Complaint of back and joint pain abdominal pain sacroiliac discomfort     Requesting Toradol injection     Toradol 60 mg IM, tolerated well     She states she would like to continue conservative management from our standpoint     Continue home exercise program as directed     Continue current medication     Follow-up 2 months      Dr. Venegas, November 2023     Bring original prescription medication bottles/container/box with labels to each visit    Pill count    Physical therapy    Massage therapy declines

## 2023-01-31 ENCOUNTER — OFFICE VISIT (OUTPATIENT)
Dept: PAIN MEDICINE | Facility: CLINIC | Age: 43
End: 2023-01-31
Payer: MEDICARE

## 2023-01-31 VITALS
WEIGHT: 172 LBS | BODY MASS INDEX: 31.65 KG/M2 | SYSTOLIC BLOOD PRESSURE: 116 MMHG | HEART RATE: 80 BPM | DIASTOLIC BLOOD PRESSURE: 75 MMHG | RESPIRATION RATE: 18 BRPM | HEIGHT: 62 IN

## 2023-01-31 DIAGNOSIS — M45.0 ANKYLOSING SPONDYLITIS OF MULTIPLE SITES IN SPINE: Primary | Chronic | ICD-10-CM

## 2023-01-31 DIAGNOSIS — Z79.899 ENCOUNTER FOR LONG-TERM (CURRENT) USE OF OTHER MEDICATIONS: ICD-10-CM

## 2023-01-31 DIAGNOSIS — M05.79 RHEUMATOID ARTHRITIS INVOLVING MULTIPLE SITES WITH POSITIVE RHEUMATOID FACTOR: Chronic | ICD-10-CM

## 2023-01-31 LAB
CTP QC/QA: YES
POC (AMP) AMPHETAMINE: NEGATIVE
POC (BAR) BARBITURATES: NEGATIVE
POC (BUP) BUPRENORPHINE: NEGATIVE
POC (BZO) BENZODIAZEPINES: NEGATIVE
POC (COC) COCAINE: NEGATIVE
POC (MDMA) METHYLENEDIOXYMETHAMPHETAMINE 3,4: NEGATIVE
POC (MET) METHAMPHETAMINE: NEGATIVE
POC (MOP) OPIATES: ABNORMAL
POC (MTD) METHADONE: NEGATIVE
POC (OXY) OXYCODONE: NEGATIVE
POC (PCP) PHENCYCLIDINE: NEGATIVE
POC (TCA) TRICYCLIC ANTIDEPRESSANTS: NEGATIVE
POC TEMPERATURE (URINE): 90
POC THC: NEGATIVE

## 2023-01-31 PROCEDURE — 96372 THER/PROPH/DIAG INJ SC/IM: CPT | Mod: PBBFAC | Performed by: PHYSICIAN ASSISTANT

## 2023-01-31 PROCEDURE — 99214 PR OFFICE/OUTPT VISIT, EST, LEVL IV, 30-39 MIN: ICD-10-PCS | Mod: S$PBB,25,, | Performed by: PHYSICIAN ASSISTANT

## 2023-01-31 PROCEDURE — 99214 OFFICE O/P EST MOD 30 MIN: CPT | Mod: S$PBB,25,, | Performed by: PHYSICIAN ASSISTANT

## 2023-01-31 PROCEDURE — 99214 OFFICE O/P EST MOD 30 MIN: CPT | Mod: PBBFAC,25 | Performed by: PHYSICIAN ASSISTANT

## 2023-01-31 PROCEDURE — 80305 DRUG TEST PRSMV DIR OPT OBS: CPT | Mod: PBBFAC | Performed by: PHYSICIAN ASSISTANT

## 2023-01-31 RX ORDER — OXYCODONE AND ACETAMINOPHEN 7.5; 325 MG/1; MG/1
1 TABLET ORAL EVERY 12 HOURS PRN
Qty: 60 TABLET | Refills: 0 | Status: SHIPPED | OUTPATIENT
Start: 2023-02-09 | End: 2023-04-06 | Stop reason: SDUPTHER

## 2023-01-31 RX ORDER — KETOROLAC TROMETHAMINE 30 MG/ML
60 INJECTION, SOLUTION INTRAMUSCULAR; INTRAVENOUS
Status: COMPLETED | OUTPATIENT
Start: 2023-01-31 | End: 2023-01-31

## 2023-01-31 RX ORDER — NALOXONE HYDROCHLORIDE 4 MG/.1ML
2 SPRAY NASAL ONCE
Qty: 2 EACH | Refills: 0 | Status: SHIPPED | OUTPATIENT
Start: 2023-01-31 | End: 2023-01-31

## 2023-01-31 RX ORDER — OXYCODONE AND ACETAMINOPHEN 7.5; 325 MG/1; MG/1
1 TABLET ORAL EVERY 12 HOURS PRN
Qty: 60 TABLET | Refills: 0 | Status: SHIPPED | OUTPATIENT
Start: 2023-03-11 | End: 2023-04-06 | Stop reason: SDUPTHER

## 2023-01-31 RX ORDER — GABAPENTIN 300 MG/1
300 CAPSULE ORAL EVERY 8 HOURS
Qty: 90 CAPSULE | Refills: 1 | Status: SHIPPED | OUTPATIENT
Start: 2023-01-31 | End: 2023-04-06 | Stop reason: SDUPTHER

## 2023-01-31 RX ADMIN — KETOROLAC TROMETHAMINE 60 MG: 30 INJECTION, SOLUTION INTRAMUSCULAR at 10:01

## 2023-02-01 ENCOUNTER — OFFICE VISIT (OUTPATIENT)
Dept: RHEUMATOLOGY | Facility: CLINIC | Age: 43
End: 2023-02-01
Payer: MEDICARE

## 2023-02-01 DIAGNOSIS — M05.79 RHEUMATOID ARTHRITIS INVOLVING MULTIPLE SITES WITH POSITIVE RHEUMATOID FACTOR: Primary | ICD-10-CM

## 2023-02-01 DIAGNOSIS — M05.79 RHEUMATOID ARTHRITIS INVOLVING MULTIPLE SITES WITH POSITIVE RHEUMATOID FACTOR: Chronic | ICD-10-CM

## 2023-02-01 DIAGNOSIS — B19.20 HEPATITIS C VIRUS INFECTION WITHOUT HEPATIC COMA, UNSPECIFIED CHRONICITY: ICD-10-CM

## 2023-02-01 DIAGNOSIS — M45.0 ANKYLOSING SPONDYLITIS OF MULTIPLE SITES IN SPINE: ICD-10-CM

## 2023-02-01 PROCEDURE — 99215 OFFICE O/P EST HI 40 MIN: CPT | Mod: 95,,, | Performed by: INTERNAL MEDICINE

## 2023-02-01 PROCEDURE — 99215 PR OFFICE/OUTPT VISIT, EST, LEVL V, 40-54 MIN: ICD-10-PCS | Mod: 95,,, | Performed by: INTERNAL MEDICINE

## 2023-02-01 RX ORDER — FOLIC ACID 1 MG/1
1 TABLET ORAL DAILY
Qty: 100 TABLET | Refills: 1 | Status: SHIPPED | OUTPATIENT
Start: 2023-02-01 | End: 2023-08-22 | Stop reason: SDUPTHER

## 2023-02-01 RX ORDER — METHOTREXATE 2.5 MG/1
TABLET ORAL
Qty: 72 TABLET | Refills: 1 | Status: SHIPPED | OUTPATIENT
Start: 2023-02-01 | End: 2023-10-19 | Stop reason: SDUPTHER

## 2023-02-01 NOTE — PROGRESS NOTES
Trinity Dewey MD   [facility] RHEUMATOLOGY  [address] [address]  [phone]      PATIENT NAME: Cherry Smiley  : 1980  DATE: 23  MRN: [id number]      Billing Provider: Trinity Dewey MD  Level of Service:   Patient PCP Information       Provider PCP Type    King Griffin MD General            The patient location is: home  The chief complaint leading to consultation is:abnormal lab review    Visit type: audio-visual    Face to Face time with patient: 30   30  minutes of total time spent on the encounter, which includes face to face time and non-face to face time preparing to see the patient (eg, review of tests), Obtaining and/or reviewing separately obtained history, Documenting clinical information in the electronic or other health record, Independently interpreting results (not separately reported) and communicating results to the patient/family/caregiver, or Care coordination (not separately reported).         Each patient to whom he or she provides medical services by telemedicine is:  (1) informed of the relationship between the physician and patient and the respective role of any other health care provider with respect to management of the patient; and (2) notified that he or she may decline to receive medical services by telemedicine and may withdraw from such care at any time.    Notes:       Reason for Visit / Chief Complaint: No chief complaint on file.           Assessment and Plan (including Health Maintenance)      Problem List  Smart Sets  Document Outside HM   :    Plan:     There are no diagnoses linked to this encounter. Patient states she is tolerating mtx.   However, today presented with chest pain which in intermittent and started 3 days ago. Patient relates severe stress at home related to daughter's pregnancy.   CP is in setting of /110 with radiation to left shoulder and numbness down left arm.   Patient is advised  to immediately go to ED. May need cardiac evaluation.   Should continue methotrexate upon discharge and set up rheumatology followup after cardiac workup.   Patient is also Hep C antibody positive and viral RNA titers are unknown.     07/20/2022;  S/p CABG since last visit on Brilinta and Metoprolol.  Would continue methotrexate 6 tab/ week.   Reminder to take folic acid    Feb 1 2023:  Has had multiple cardiac catheterizations over the last year.   C/o GI issues now. Not clear if related to mtx because she also has blood in her stool.   She is hepatitis C ++   Needs asap GI referral Dr Ezequiel Miles.         Total time spent in Assessment, Co-ordination of Care , Review of Care Plan , Goal Setting and Counseling on this initial visit is ~ 60 minutes     There is currently no information documented on the homunculus. Go to the Rheumatology activity and complete the homunculus joint exam.               History of Present Illness / Problem Focused Workflow     Cherry Smiley presents to the clinic with No chief complaint on file.     Dx with RA 3 years ago. Was experiencing swelling in her small joints including hands, wrists and elbows.   Also had bilateral rotator cuff injuries.   Hx of childhood traumatic fractures of the right patella and R foot. All were traumatic.   Patient states the epidural injections to the spine are not helping.   States she is unable to move when she awakens in the morning. Stiffness is severe.    who accompanies her relates that she is not able to get in and out of a car easily.   Patient is also on Percocet twice daily  No h/o DMARD initiation. States her rheumatologist has her on NSAIDs although she has not followed up since 2019.   Is active smoker - one pack per day   Significant dental cavities.   Does have h/o synovitis or swelling in her knees.   Also has h/o chest pain.       Review of Systems     Review of Systems   Constitutional:  Positive for unexpected weight change.  Negative for fever.   HENT:  Negative for mouth sores and trouble swallowing.    Eyes:  Negative for redness.   Respiratory:  Positive for shortness of breath. Negative for cough.    Cardiovascular:  Positive for chest pain.   Gastrointestinal:  Negative for constipation and diarrhea.   Skin:  Negative for rash.   Neurological:  Positive for headaches.   Hematological:  Bruises/bleeds easily.       Medical / Social / Family History     Past Medical History:   Diagnosis Date    Allergy     Anxiety     Congenital hypothyroidism     Depressive disorder     GERD (gastroesophageal reflux disease)     Hyperlipidemia     Hypertension     Insomnia     Irritable bowel syndrome     Lumbosacral spondylosis     NSTEMI (non-ST elevated myocardial infarction) 05/09/2022    Primary fibromyalgia syndrome     Seizures     Sleep apnea     Type 2 diabetes mellitus        Past Surgical History:   Procedure Laterality Date    ADENOIDECTOMY      ANGIOGRAM, CORONARY, WITH LEFT HEART CATHETERIZATION N/A 5/9/2022    Procedure: Angiogram, Coronary, with Left Heart Cath;  Surgeon: Arthur Dewey MD;  Location: [facility] CATH LAB;  Service: Cardiology;  Laterality: N/A;    EPIDURAL STEROID INJECTION  09/02/2020    C5-6 FABIANO - Dr Venegas    ESOPHAGOGASTRODUODENOSCOPY      HYSTERECTOMY      INJECTION OF FACET JOINT Left 12/27/2018    Left C6-7 Facet Injection - Deb    INJECTION OF FACET JOINT Right 05/19/2019    Right C4-7 Facet Injection X 2 -Deb    INJECTION OF FACET JOINT Left 02/27/2019    Left C4-7 Facet Injection -Deb    knee bilateral      LEFT HEART CATHETERIZATION N/A 9/15/2022    Procedure: Left heart cath;  Surgeon: Arthur Dewey MD;  Location: [facility] CATH LAB;  Service: Cardiology;  Laterality: N/A;    RADIOFREQUENCY THERMOCOAGULATION Right 05/21/2019    Right C4-7 RFTC - Deb    RADIOFREQUENCY THERMOCOAGULATION Left 04/23/2019    Left C4-7 RFTC -Deb    shoulder bilateral      TONSILLECTOMY         Social History  Ms.  Cherry Smiley  reports that she has been smoking. She has never used smokeless tobacco. She reports that she does not currently use alcohol. She reports that she does not use drugs.    Family History  Ms.'s Cherry Smiley family history includes Cancer in her brother; Diabetes in her brother and mother; Heart disease in her father and mother; Hypertension in her brother, father, and mother; Lupus in her father; Stroke in her father and mother.    Medications and Allergies     Medications  No outpatient medications have been marked as taking for the 2/1/23 encounter (Appointment) with Trinity Dewey MD.       Allergies  Review of patient's allergies indicates:   Allergen Reactions    Cinnamon analogues Anaphylaxis    Iodine Anaphylaxis    Morpholine analogues Shortness Of Breath    Shellfish containing products Anaphylaxis    Pamelor [nortriptyline]        Physical Examination     There were no vitals filed for this visit.    Physical Exam  Constitutional:       Appearance: She is obese.   HENT:      Head: Normocephalic and atraumatic.      Right Ear: External ear normal.      Left Ear: External ear normal.      Nose: Nose normal. No congestion or rhinorrhea.   Eyes:      Extraocular Movements: Extraocular movements intact.      Pupils: Pupils are equal, round, and reactive to light.   Cardiovascular:      Pulses: Normal pulses.   Pulmonary:      Effort: Pulmonary effort is normal.      Breath sounds: Normal breath sounds. No wheezing.   Chest:      Chest wall: No tenderness.   Musculoskeletal:         General: Swelling and tenderness present. Normal range of motion.      Cervical back: No rigidity or tenderness.      Comments: Multiple tender and swollen joints MCP ++   Spinal stiffness +    Lymphadenopathy:      Cervical: No cervical adenopathy.   Skin:     Findings: No rash.   Neurological:      General: No focal deficit present.      Mental Status: She is alert and oriented to person, place, and time.    Psychiatric:         Mood and Affect: Mood normal.         Thought Content: Thought content normal.              Signature:  Trinity Dewey MD  [facility] RHEUMATOLOGY  [address] [place] [id number]  [phone]    Date of encounter: 2/1/23

## 2023-02-06 ENCOUNTER — OFFICE VISIT (OUTPATIENT)
Dept: GASTROENTEROLOGY | Facility: CLINIC | Age: 43
End: 2023-02-06
Payer: MEDICARE

## 2023-02-06 VITALS
DIASTOLIC BLOOD PRESSURE: 62 MMHG | SYSTOLIC BLOOD PRESSURE: 100 MMHG | HEART RATE: 74 BPM | BODY MASS INDEX: 32.27 KG/M2 | HEIGHT: 62 IN | WEIGHT: 175.38 LBS | OXYGEN SATURATION: 98 %

## 2023-02-06 DIAGNOSIS — B19.20 HEPATITIS C VIRUS INFECTION WITHOUT HEPATIC COMA, UNSPECIFIED CHRONICITY: ICD-10-CM

## 2023-02-06 DIAGNOSIS — R11.0 NAUSEA: ICD-10-CM

## 2023-02-06 DIAGNOSIS — K59.00 CONSTIPATION, UNSPECIFIED CONSTIPATION TYPE: ICD-10-CM

## 2023-02-06 DIAGNOSIS — K92.1 BLOOD IN STOOL: Primary | ICD-10-CM

## 2023-02-06 DIAGNOSIS — R10.9 ABDOMINAL PAIN, UNSPECIFIED ABDOMINAL LOCATION: ICD-10-CM

## 2023-02-06 PROCEDURE — 99214 OFFICE O/P EST MOD 30 MIN: CPT | Mod: S$PBB,,,

## 2023-02-06 PROCEDURE — 99214 PR OFFICE/OUTPT VISIT, EST, LEVL IV, 30-39 MIN: ICD-10-PCS | Mod: S$PBB,,,

## 2023-02-06 PROCEDURE — 99215 OFFICE O/P EST HI 40 MIN: CPT | Mod: PBBFAC

## 2023-02-06 RX ORDER — ONDANSETRON 4 MG/1
4 TABLET, FILM COATED ORAL EVERY 4 HOURS PRN
COMMUNITY
Start: 2023-01-30 | End: 2023-10-03

## 2023-02-06 RX ORDER — PEN NEEDLE, DIABETIC 29 G X1/2"
NEEDLE, DISPOSABLE MISCELLANEOUS
COMMUNITY
Start: 2022-10-12

## 2023-02-06 RX ORDER — POLYETHYLENE GLYCOL 3350 17 G/17G
POWDER, FOR SOLUTION ORAL
COMMUNITY
Start: 2023-01-26 | End: 2023-10-03

## 2023-02-06 RX ORDER — BLOOD SUGAR DIAGNOSTIC
1 STRIP MISCELLANEOUS 3 TIMES DAILY
COMMUNITY
Start: 2022-10-01

## 2023-02-06 RX ORDER — FENOFIBRATE 145 MG/1
145 TABLET, FILM COATED ORAL DAILY
COMMUNITY
Start: 2023-01-29

## 2023-02-06 RX ORDER — PEN NEEDLE, DIABETIC 30 GX3/16"
NEEDLE, DISPOSABLE MISCELLANEOUS
COMMUNITY
Start: 2022-07-01

## 2023-02-06 RX ORDER — PANTOPRAZOLE SODIUM 40 MG/1
40 TABLET, DELAYED RELEASE ORAL EVERY MORNING
COMMUNITY
Start: 2023-01-29 | End: 2023-12-05

## 2023-02-06 RX ORDER — DIPHENHYDRAMINE HCL 25 MG
25 CAPSULE ORAL EVERY 6 HOURS PRN
COMMUNITY
End: 2023-10-03

## 2023-02-06 RX ORDER — NITROGLYCERIN 0.4 MG/1
0.4 TABLET SUBLINGUAL EVERY 5 MIN PRN
COMMUNITY
Start: 2023-01-02

## 2023-02-06 RX ORDER — DIMENHYDRINATE 50 MG
50 TABLET ORAL NIGHTLY PRN
COMMUNITY
End: 2023-10-03

## 2023-02-06 RX ORDER — INSULIN DETEMIR 100 [IU]/ML
INJECTION, SOLUTION SUBCUTANEOUS
COMMUNITY
Start: 2023-01-02

## 2023-02-06 RX ORDER — POLYETHYLENE GLYCOL 3350, SODIUM SULFATE ANHYDROUS, SODIUM BICARBONATE, SODIUM CHLORIDE, POTASSIUM CHLORIDE 236; 22.74; 6.74; 5.86; 2.97 G/4L; G/4L; G/4L; G/4L; G/4L
4 POWDER, FOR SOLUTION ORAL ONCE
Qty: 4000 ML | Refills: 0 | Status: SHIPPED | OUTPATIENT
Start: 2023-02-06 | End: 2023-02-06

## 2023-02-06 NOTE — PATIENT INSTRUCTIONS
Increase fiber to 35 grams daily  Drink 64 ounces of water daily  Miralax powder 1 capful 17 grams in 8 ounces of liquid daily to twice daily

## 2023-02-06 NOTE — PROGRESS NOTES
Cherry Smiley is a 42 y.o. female here for Hepatitis C        PCP: King Griffin  Referring Provider: Trinity Dewey Md  [address] [place] [id number]     HPI:  Patient is a 41 yo female who presents to clinic as referral for Hepatitis C positive antibody, lower abdominal pain, constipation, and blood in stool. Patient reports symptoms started 2 weeks ago. She also reports nausea with vomiting. She was seen in ED and told to take Miralax until she had a bowel movement. She reports she did this, but then stopped taking the Miralax. She reports small bowel movement yesterday. She describes blood as bright red. Also reports rectal pain. She denies any family history of colon cancer that she is aware of. She reports she was told she has IBS 22 years ago when she underwent last colonoscopy at [facility].     Patient has significant cardiac hx with multiple stents; most recent was placed 11/2022. Patient is on Brilinta. Followed by cardiology at [facility] in [place]         ROS:  Review of Systems   Constitutional:  Positive for appetite change. Negative for fever and unexpected weight change.   HENT:  Negative for trouble swallowing.    Respiratory:  Positive for shortness of breath.    Cardiovascular:  Negative for leg swelling.   Gastrointestinal:  Positive for abdominal pain, blood in stool, constipation, diarrhea, nausea, rectal pain and vomiting.   Integumentary:  Negative for color change.   Neurological:  Positive for headaches. Negative for weakness.        PMHX:  has a past medical history of Allergy, Anxiety, Congenital hypothyroidism, Depressive disorder, GERD (gastroesophageal reflux disease), Hyperlipidemia, Hypertension, Insomnia, Irritable bowel syndrome, Lumbosacral spondylosis, NSTEMI (non-ST elevated myocardial infarction) (05/09/2022), Primary fibromyalgia syndrome, Seizures, Sleep apnea, and Type 2 diabetes mellitus.    PSHX:  has a past surgical history that includes  "shoulder bilateral; knee bilateral; Hysterectomy; Adenoidectomy; Epidural steroid injection (09/02/2020); Esophagogastroduodenoscopy; Injection of facet joint (Left, 12/27/2018); Tonsillectomy; Injection of facet joint (Right, 05/19/2019); Radiofrequency thermocoagulation (Right, 05/21/2019); Injection of facet joint (Left, 02/27/2019); Radiofrequency thermocoagulation (Left, 04/23/2019); ANGIOGRAM, CORONARY, WITH LEFT HEART CATHETERIZATION (N/A, 5/9/2022); and Left heart catheterization (N/A, 9/15/2022).    PFHX: family history includes Cancer in her brother; Diabetes in her brother and mother; Heart disease in her father and mother; Hypertension in her brother, father, and mother; Lupus in her father; Stroke in her father and mother.    PSlHX:  reports that she has quit smoking. Her smoking use included cigarettes. She has a 35.00 pack-year smoking history. She has never used smokeless tobacco. She reports that she does not currently use alcohol. She reports that she does not currently use drugs after having used the following drugs: Marijuana.        Review of patient's allergies indicates:   Allergen Reactions    Cinnamon analogues Anaphylaxis    Iodine Anaphylaxis    Morpholine analogues Shortness Of Breath    Shellfish containing products Anaphylaxis    Pamelor [nortriptyline]        Medication List with Changes/Refills   New Medications    POLYETHYLENE GLYCOL (GOLYTELY) 236-22.74-6.74 -5.86 GRAM SUSPENSION    Take 4,000 mLs (4 L total) by mouth once. for 1 dose   Current Medications    ASPIRIN (ECOTRIN) 81 MG EC TABLET    Take 1 tablet (81 mg total) by mouth once daily.    ATORVASTATIN (LIPITOR) 80 MG TABLET    Take 1 tablet (80 mg total) by mouth every evening.    BD INSULIN SYRINGE ULTRA-FINE 0.5 ML 30 GAUGE X 1/2" SYRG    USE SYRINGE THREE TIMES DAILY AS DIRECTED    DAPAGLIFLOZIN-METFORMIN (XIGDUO XR) 5-500 MG TBPH    Take by mouth.    DIMENHYDRINATE (DRAMAMINE) 50 MG TABLET    Take 50 mg by mouth nightly " "as needed.    DIPHENHYDRAMINE (BENADRYL) 25 MG CAPSULE    Take 25 mg by mouth every 6 (six) hours as needed.    FENOFIBRATE (TRICOR) 145 MG TABLET    Take 145 mg by mouth once daily.    FOLIC ACID (FOLVITE) 1 MG TABLET    Take 1 tablet (1 mg total) by mouth once daily.    GABAPENTIN (NEURONTIN) 300 MG CAPSULE    Take 1 capsule (300 mg total) by mouth every 8 (eight) hours.    INSULIN NPH-INSULIN REGULAR, 70/30, (NOVOLIN 70/30) 100 UNIT/ML (70-30) INJECTION    Inject 22 Units into the skin 2 (two) times daily before meals.    ISOSORBIDE MONONITRATE (IMDUR) 30 MG 24 HR TABLET    Take 1 tablet (30 mg total) by mouth once daily.    LEVEMIR FLEXTOUCH U-100 INSULN 100 UNIT/ML (3 ML) INPN PEN    INJECT 40 UNITS SUBCUTANEOUSLY ONCE DAILY    LEVETIRACETAM (KEPPRA) 750 MG TAB    Take 1 tablet (750 mg total) by mouth 2 (two) times daily.    LISINOPRIL 10 MG TABLET    Take 1 tablet (10 mg total) by mouth once daily.    METHOTREXATE 2.5 MG TAB    TAKE 6 TABLETS BY MOUTH ONCE A WEEK    METOPROLOL SUCCINATE (TOPROL-XL) 200 MG 24 HR TABLET    Take 1 tablet (200 mg total) by mouth once daily.    NITROGLYCERIN (NITROSTAT) 0.4 MG SL TABLET    Place 0.4 mg under the tongue every 5 (five) minutes as needed. DO NOT EXCEED A TOTAL OF 3 DOSES IN 15 MINUTES    ONDANSETRON (ZOFRAN) 4 MG TABLET    Take 4 mg by mouth every 4 (four) hours as needed.    ONETOUCH ULTRA TEST STRP    1 strip 3 (three) times daily. To check blood sugar    OXYCODONE-ACETAMINOPHEN (PERCOCET) 7.5-325 MG PER TABLET    Take 1 tablet by mouth every 12 (twelve) hours as needed for Pain.    OXYCODONE-ACETAMINOPHEN (PERCOCET) 7.5-325 MG PER TABLET    Take 1 tablet by mouth every 12 (twelve) hours as needed for Pain.    PANTOPRAZOLE (PROTONIX) 40 MG TABLET    Take 40 mg by mouth every morning.    PEN NEEDLE, DIABETIC 32 GAUGE X 5/32" NDLE    USE AS DIRECTED WITH LEVEMIR PEN    POLYETHYLENE GLYCOL (GLYCOLAX) 17 GRAM/DOSE POWDER    Uses PRN    RANOLAZINE (RANEXA) 500 MG TB12    " "Take 500 mg by mouth.    TICAGRELOR (BRILINTA) 90 MG TABLET    Take 1 tablet (90 mg total) by mouth 2 (two) times daily.   Discontinued Medications    INSULIN DETEMIR U-100 (LEVEMIR) 100 UNIT/ML INJECTION    Inject 40 Units into the skin every evening.        Objective Findings:  Vital Signs:  /62   Pulse 74   Ht 5' 2" (1.575 m)   Wt 79.6 kg (175 lb 6.4 oz)   SpO2 98%   BMI 32.08 kg/m²  Body mass index is 32.08 kg/m².    Physical Exam:  Physical Exam  Vitals reviewed.   Constitutional:       General: She is not in acute distress.     Appearance: Normal appearance.   HENT:      Mouth/Throat:      Mouth: Mucous membranes are moist.   Cardiovascular:      Rate and Rhythm: Normal rate and regular rhythm.      Pulses: Normal pulses.   Pulmonary:      Effort: Pulmonary effort is normal.      Breath sounds: Normal breath sounds.   Abdominal:      General: Bowel sounds are normal.      Palpations: Abdomen is soft. There is no mass.      Tenderness: There is abdominal tenderness. There is no guarding.   Musculoskeletal:         General: Normal range of motion.   Skin:     General: Skin is warm and dry.   Neurological:      Mental Status: She is alert and oriented to person, place, and time. Mental status is at baseline.   Psychiatric:         Mood and Affect: Mood normal.        Labs:  Lab Results   Component Value Date    WBC 19.92 (H) 09/15/2022    HGB 13.7 09/15/2022    HCT 40.5 09/15/2022    MCV 98.5 (H) 09/15/2022    RDW 14.5 09/15/2022     09/15/2022    LYMPH 4.9 (L) 09/15/2022    LYMPH 0.98 (L) 09/15/2022    LYMPH 7 (L) 09/15/2022    MONO 1.4 (L) 09/15/2022    MONO 2 09/15/2022    EOS 0.00 09/15/2022    BASO 0.03 09/15/2022     Lab Results   Component Value Date     (L) 09/15/2022    K 4.3 09/15/2022    CL 97 (L) 09/15/2022    CO2 23 09/15/2022     (H) 09/15/2022    BUN 16 09/15/2022    CREATININE 1.32 (H) 09/15/2022    CALCIUM 9.7 09/15/2022    PROT 7.5 07/12/2022    ALBUMIN 3.6 " 07/12/2022    BILITOT 0.3 07/12/2022    ALKPHOS 100 (H) 07/12/2022    AST 10 (L) 07/12/2022    ALT 27 07/12/2022         Imaging: No results found.      Assessment:  Cherry Smiley is a 42 y.o. female here with:  1. Blood in stool    2. Hepatitis C virus infection without hepatic coma, unspecified chronicity    3. Abdominal pain, unspecified abdominal location    4. Constipation, unspecified constipation type    5. Nausea          Recommendations:  1. Labs below today  2. Abdominal ultrasound  3. Schedule colonoscopy pending cardiology clearance  4. Miralax 17 gm daily to twice daily     Follow up in about 3 months (around 5/6/2023).      Order summary:  Orders Placed This Encounter    US Abdomen Complete    Hepatitis C Virus (HCV) RNA Detection and Quantification, RT-PCR    CBC Auto Differential    Comprehensive Metabolic Panel    polyethylene glycol (GOLYTELY) 236-22.74-6.74 -5.86 gram suspension    Colonoscopy       Thank you for allowing me to participate in the care of Cherry Smiley.      Stormy Sotelo, FNP-BC, KELVIN-ACNP-BC

## 2023-02-15 ENCOUNTER — PATIENT MESSAGE (OUTPATIENT)
Dept: GASTROENTEROLOGY | Facility: CLINIC | Age: 43
End: 2023-02-15
Payer: MEDICARE

## 2023-02-15 ENCOUNTER — HOSPITAL ENCOUNTER (OUTPATIENT)
Dept: RADIOLOGY | Facility: HOSPITAL | Age: 43
Discharge: HOME | End: 2023-02-15
Payer: MEDICARE

## 2023-02-15 DIAGNOSIS — R10.9 ABDOMINAL PAIN, UNSPECIFIED ABDOMINAL LOCATION: ICD-10-CM

## 2023-02-15 PROCEDURE — 76700 US EXAM ABDOM COMPLETE: CPT | Mod: 26,,, | Performed by: RADIOLOGY

## 2023-02-15 PROCEDURE — 76700 US ABDOMEN COMPLETE: ICD-10-PCS | Mod: 26,,, | Performed by: RADIOLOGY

## 2023-02-15 PROCEDURE — 76700 US EXAM ABDOM COMPLETE: CPT | Mod: TC

## 2023-02-22 ENCOUNTER — PATIENT MESSAGE (OUTPATIENT)
Dept: GASTROENTEROLOGY | Facility: CLINIC | Age: 43
End: 2023-02-22
Payer: MEDICARE

## 2023-03-02 ENCOUNTER — TELEPHONE (OUTPATIENT)
Dept: GASTROENTEROLOGY | Facility: CLINIC | Age: 43
End: 2023-03-02
Payer: MEDICARE

## 2023-03-02 NOTE — TELEPHONE ENCOUNTER
Attempted to call patient regarding her blood thinner and colonoscopy appointment. Patient did not answer. Left message for patient to call back.

## 2023-04-06 ENCOUNTER — OFFICE VISIT (OUTPATIENT)
Dept: PAIN MEDICINE | Facility: CLINIC | Age: 43
End: 2023-04-06
Payer: MEDICARE

## 2023-04-06 VITALS
BODY MASS INDEX: 32.2 KG/M2 | SYSTOLIC BLOOD PRESSURE: 95 MMHG | RESPIRATION RATE: 18 BRPM | WEIGHT: 175 LBS | DIASTOLIC BLOOD PRESSURE: 66 MMHG | HEIGHT: 62 IN | HEART RATE: 72 BPM

## 2023-04-06 DIAGNOSIS — Z79.899 ENCOUNTER FOR LONG-TERM (CURRENT) USE OF OTHER MEDICATIONS: ICD-10-CM

## 2023-04-06 DIAGNOSIS — M45.0 ANKYLOSING SPONDYLITIS OF MULTIPLE SITES IN SPINE: Primary | Chronic | ICD-10-CM

## 2023-04-06 DIAGNOSIS — M05.79 RHEUMATOID ARTHRITIS INVOLVING MULTIPLE SITES WITH POSITIVE RHEUMATOID FACTOR: Chronic | ICD-10-CM

## 2023-04-06 PROCEDURE — 99214 OFFICE O/P EST MOD 30 MIN: CPT | Mod: S$PBB,,, | Performed by: PHYSICIAN ASSISTANT

## 2023-04-06 PROCEDURE — 99214 PR OFFICE/OUTPT VISIT, EST, LEVL IV, 30-39 MIN: ICD-10-PCS | Mod: S$PBB,,, | Performed by: PHYSICIAN ASSISTANT

## 2023-04-06 PROCEDURE — 80305 DRUG TEST PRSMV DIR OPT OBS: CPT | Mod: PBBFAC | Performed by: PHYSICIAN ASSISTANT

## 2023-04-06 PROCEDURE — 99215 OFFICE O/P EST HI 40 MIN: CPT | Mod: PBBFAC | Performed by: PHYSICIAN ASSISTANT

## 2023-04-06 RX ORDER — OXYCODONE AND ACETAMINOPHEN 7.5; 325 MG/1; MG/1
1 TABLET ORAL EVERY 12 HOURS PRN
Qty: 60 TABLET | Refills: 0 | Status: SHIPPED | OUTPATIENT
Start: 2023-05-10 | End: 2023-06-06 | Stop reason: SDUPTHER

## 2023-04-06 RX ORDER — GABAPENTIN 300 MG/1
300 CAPSULE ORAL EVERY 8 HOURS
Qty: 90 CAPSULE | Refills: 1 | Status: SHIPPED | OUTPATIENT
Start: 2023-04-06 | End: 2023-06-06 | Stop reason: SDUPTHER

## 2023-04-06 RX ORDER — OXYCODONE AND ACETAMINOPHEN 7.5; 325 MG/1; MG/1
1 TABLET ORAL EVERY 12 HOURS PRN
Qty: 60 TABLET | Refills: 0 | Status: SHIPPED | OUTPATIENT
Start: 2023-04-10 | End: 2023-06-06 | Stop reason: SDUPTHER

## 2023-04-06 NOTE — PROGRESS NOTES
Subjective:         Patient ID: Cherry Smiley is a 42 y.o. female.    Chief Complaint: Low-back Pain and Hip Pain        Pain  This is a chronic problem. The current episode started more than 1 year ago. The problem occurs daily. The problem has been waxing and waning. Associated symptoms include arthralgias and neck pain. Pertinent negatives include no anorexia, chest pain, chills, coughing, diaphoresis, fatigue, fever, sore throat, vertigo or vomiting.   Review of Systems   Constitutional:  Negative for activity change, appetite change, chills, diaphoresis, fatigue, fever and unexpected weight change.   HENT:  Negative for drooling, ear discharge, facial swelling, nosebleeds, sore throat, trouble swallowing, voice change and goiter.    Eyes:  Negative for photophobia, pain, discharge, redness and visual disturbance.   Respiratory:  Negative for apnea, cough, choking, chest tightness, shortness of breath, wheezing and stridor.    Cardiovascular:  Negative for chest pain, palpitations and leg swelling.   Gastrointestinal:  Negative for abdominal distention, anorexia, diarrhea, rectal pain, vomiting and fecal incontinence.   Endocrine: Negative for cold intolerance, heat intolerance, polydipsia, polyphagia and polyuria.   Genitourinary:  Negative for flank pain, frequency and hot flashes.   Musculoskeletal:  Positive for arthralgias, back pain, neck pain and neck stiffness.   Integumentary:  Negative for color change and pallor.   Allergic/Immunologic: Negative for immunocompromised state.   Neurological:  Negative for dizziness, vertigo, seizures, syncope, facial asymmetry, speech difficulty, light-headedness, coordination difficulties, memory loss and coordination difficulties.   Hematological:  Negative for adenopathy. Does not bruise/bleed easily.   Psychiatric/Behavioral:  Negative for agitation, behavioral problems, confusion, decreased concentration, dysphoric mood, hallucinations, self-injury and suicidal  ideas. The patient is not nervous/anxious and is not hyperactive.          Past Medical History:   Diagnosis Date    Allergy     Anxiety     Congenital hypothyroidism     Depressive disorder     GERD (gastroesophageal reflux disease)     Hyperlipidemia     Hypertension     Insomnia     Irritable bowel syndrome     Lumbosacral spondylosis     NSTEMI (non-ST elevated myocardial infarction) 05/09/2022    Primary fibromyalgia syndrome     Seizures     Sleep apnea     Type 2 diabetes mellitus      Past Surgical History:   Procedure Laterality Date    ADENOIDECTOMY      ANGIOGRAM, CORONARY, WITH LEFT HEART CATHETERIZATION N/A 5/9/2022    Procedure: Angiogram, Coronary, with Left Heart Cath;  Surgeon: Arthur Dewey MD;  Location: [facility] CATH LAB;  Service: Cardiology;  Laterality: N/A;    EPIDURAL STEROID INJECTION  09/02/2020    C5-6 [facility] - Dr Venegas    ESOPHAGOGASTRODUODENOSCOPY      HYSTERECTOMY      INJECTION OF FACET JOINT Left 12/27/2018    Left C6-7 Facet Injection - Deb    INJECTION OF FACET JOINT Right 05/19/2019    Right C4-7 Facet Injection X 2 -Deb    INJECTION OF FACET JOINT Left 02/27/2019    Left C4-7 Facet Injection -Deb    knee bilateral      LEFT HEART CATHETERIZATION N/A 9/15/2022    Procedure: Left heart cath;  Surgeon: Arthur Dewey MD;  Location: [facility] CATH LAB;  Service: Cardiology;  Laterality: N/A;    RADIOFREQUENCY THERMOCOAGULATION Right 05/21/2019    Right C4-7 RFTC - Deb    RADIOFREQUENCY THERMOCOAGULATION Left 04/23/2019    Left C4-7 RFTC -Deb    shoulder bilateral      TONSILLECTOMY       Social History     Socioeconomic History    Marital status:    Tobacco Use    Smoking status: Former     Packs/day: 1.00     Years: 35.00     Pack years: 35.00     Types: Cigarettes    Smokeless tobacco: Never   Substance and Sexual Activity    Alcohol use: Not Currently    Drug use: Not Currently     Types: Marijuana     Family History   Problem Relation Age of Onset    Stroke  "Mother     Diabetes Mother     Heart disease Mother     Hypertension Mother     Hypertension Father     Heart disease Father     Stroke Father     Lupus Father     Cancer Brother     Diabetes Brother     Hypertension Brother      Review of patient's allergies indicates:   Allergen Reactions    Cinnamon analogues Anaphylaxis    Iodine Anaphylaxis    Morpholine analogues Shortness Of Breath    Shellfish containing products Anaphylaxis    Pamelor [nortriptyline]         Objective:  Vitals:    04/06/23 0837   BP: 95/66   Pulse: 72   Resp: 18   Weight: 79.4 kg (175 lb)   Height: 5' 2" (1.575 m)   PainSc:   8         Physical Exam  Vitals and nursing note reviewed. Exam conducted with a chaperone present.   Constitutional:       General: She is awake. She is not in acute distress.     Appearance: Normal appearance. She is not ill-appearing, toxic-appearing or diaphoretic.   HENT:      Head: Normocephalic and atraumatic.      Nose: Nose normal.      Mouth/Throat:      Mouth: Mucous membranes are moist.      Pharynx: Oropharynx is clear.   Eyes:      Conjunctiva/sclera: Conjunctivae normal.      Pupils: Pupils are equal, round, and reactive to light.   Cardiovascular:      Rate and Rhythm: Normal rate.   Pulmonary:      Effort: Pulmonary effort is normal. No respiratory distress.   Abdominal:      Palpations: Abdomen is soft.   Musculoskeletal:         General: Normal range of motion.      Right shoulder: Tenderness present.      Left shoulder: Tenderness present.      Right wrist: Tenderness present.      Left wrist: Tenderness present.      Cervical back: Normal range of motion and neck supple. Tenderness present.      Thoracic back: Tenderness present.      Lumbar back: Tenderness present.   Skin:     General: Skin is warm and dry.   Neurological:      General: No focal deficit present.      Mental Status: She is alert and oriented to person, place, and time. Mental status is at baseline.      Cranial Nerves: No cranial " nerve deficit (II-XII).   Psychiatric:         Mood and Affect: Mood normal.         Behavior: Behavior normal. Behavior is cooperative.         Thought Content: Thought content normal.         US Abdomen Complete  Narrative: EXAMINATION:  US ABDOMEN COMPLETE    CLINICAL HISTORY:  Abdominal Pain    TECHNIQUE:  Grayscale and color Doppler imaging performed    COMPARISON:  None available    FINDINGS:  The liver is normal in size and echogenicity. The gallbladder is fluid-filled without evidence of stones or sludge. The gallbladder wall thickness is 2.0 mm. The common bile duct measures 2.0 mm.    The visualized portion of the pancreas appear within normal limits    The kidneys are normal in size and echogenicity without hydronephrosis or other abnormality. The right renal length is 11.3 cm. Left renal length is 11.1 cm.    Spleen, aorta and IVC appear within normal limits. No free fluid or free air seen.  Impression: No evidence of abnormality demonstrated.    Ultrasound images stored and captured.    Electronically signed by: Eren James  Date:    02/15/2023  Time:    09:16         Lab Visit on 02/06/2023   Component Date Value Ref Range Status    HCV RNA Detect/Quant 02/06/2023 Undetected  Undetected IU/mL Final    Sodium 02/06/2023 139  136 - 145 mmol/L Final    Potassium 02/06/2023 3.8  3.5 - 5.1 mmol/L Final    Chloride 02/06/2023 107  98 - 107 mmol/L Final    CO2 02/06/2023 29  21 - 32 mmol/L Final    Anion Gap 02/06/2023 7  7 - 16 mmol/L Final    Glucose 02/06/2023 177 (H)  74 - 106 mg/dL Final    BUN 02/06/2023 13  7 - 18 mg/dL Final    Creatinine 02/06/2023 0.98  0.55 - 1.02 mg/dL Final    BUN/Creatinine Ratio 02/06/2023 13  6 - 20 Final    Calcium 02/06/2023 9.7  8.5 - 10.1 mg/dL Final    Total Protein 02/06/2023 7.3  6.4 - 8.2 g/dL Final    Albumin 02/06/2023 3.4 (L)  3.5 - 5.0 g/dL Final    Globulin 02/06/2023 3.9  2.0 - 4.0 g/dL Final    A/G Ratio 02/06/2023 0.9   Final    Bilirubin, Total 02/06/2023  0.2  >0.0 - 1.2 mg/dL Final    Alk Phos 02/06/2023 71  37 - 98 U/L Final    ALT 02/06/2023 22  13 - 56 U/L Final    AST 02/06/2023 11 (L)  15 - 37 U/L Final    eGFR 02/06/2023 73  >=60 mL/min/1.73m² Final    WBC 02/06/2023 6.04  4.50 - 11.00 K/uL Final    RBC 02/06/2023 3.70 (L)  4.20 - 5.40 M/uL Final    Hemoglobin 02/06/2023 12.8  12.0 - 16.0 g/dL Final    Hematocrit 02/06/2023 38.9  38.0 - 47.0 % Final    MCV 02/06/2023 105.1 (H)  80.0 - 96.0 fL Final    MCH 02/06/2023 34.6 (H)  27.0 - 31.0 pg Final    MCHC 02/06/2023 32.9  32.0 - 36.0 g/dL Final    RDW 02/06/2023 16.3 (H)  11.5 - 14.5 % Final    Platelet Count 02/06/2023 462 (H)  150 - 400 K/uL Final    MPV 02/06/2023 10.0  9.4 - 12.4 fL Final    Neutrophils % 02/06/2023 58.7  53.0 - 65.0 % Final    Lymphocytes % 02/06/2023 25.2 (L)  27.0 - 41.0 % Final    Monocytes % 02/06/2023 8.6 (H)  2.0 - 6.0 % Final    Eosinophils % 02/06/2023 5.8 (H)  1.0 - 4.0 % Final    Basophils % 02/06/2023 1.2 (H)  0.0 - 1.0 % Final    Immature Granulocytes % 02/06/2023 0.5 (H)  0.0 - 0.4 % Final    nRBC, Auto 02/06/2023 0.0  <=0.0 % Final    Neutrophils, Abs 02/06/2023 3.55  1.80 - 7.70 K/uL Final    Lymphocytes, Absolute 02/06/2023 1.52  1.00 - 4.80 K/uL Final    Monocytes, Absolute 02/06/2023 0.52  0.00 - 0.80 K/uL Final    Eosinophils, Absolute 02/06/2023 0.35  0.00 - 0.50 K/uL Final    Basophils, Absolute 02/06/2023 0.07  0.00 - 0.20 K/uL Final    Immature Granulocytes, Absolute 02/06/2023 0.03  0.00 - 0.04 K/uL Final    nRBC, Absolute 02/06/2023 0.00  <=0.00 x10e3/uL Final    Diff Type 02/06/2023 Scan Smear   Final    Platelet Morphology 02/06/2023 Large & Giant Platelets (A)  Normal Final    Anisocytosis 02/06/2023 1+   Final    Macrocytosis 02/06/2023 1+   Final    Polychromasia 02/06/2023 Few   Final   Office Visit on 01/31/2023   Component Date Value Ref Range Status    POC Amphetamines 01/31/2023 Negative  Negative, Inconclusive Final    POC Barbiturates 01/31/2023  Negative  Negative, Inconclusive Final    POC Benzodiazepines 01/31/2023 Negative  Negative, Inconclusive Final    POC Cocaine 01/31/2023 Negative  Negative, Inconclusive Final    POC THC 01/31/2023 Negative  Negative, Inconclusive Final    POC Methadone 01/31/2023 Negative  Negative, Inconclusive Final    POC Methamphetamine 01/31/2023 Negative  Negative, Inconclusive Final    POC Opiates 01/31/2023 Presumptive Positive (A)  Negative, Inconclusive Final    POC Oxycodone 01/31/2023 Negative  Negative, Inconclusive Final    POC Phencyclidine 01/31/2023 Negative  Negative, Inconclusive Final    POC Methylenedioxymethamphetamine * 01/31/2023 Negative  Negative, Inconclusive Final    POC Tricyclic Antidepressants 01/31/2023 Negative  Negative, Inconclusive Final    POC Buprenorphine 01/31/2023 Negative   Final     Acceptable 01/31/2023 Yes   Final    POC Temperature (Urine) 01/31/2023 90   Final         Orders Placed This Encounter   Procedures    POCT Urine Drug Screen Presump     Interpretive Information:     Negative:  No drug detected at the cut off level.   Positive:  This result represents presumptive positive for the   tested drug, other substances may yield a positive response other   than the analyte of interest. This result should be utilized for   diagnostic purpose only. Confirmation testing will be performed upon physician request only.          Requested Prescriptions     Signed Prescriptions Disp Refills    gabapentin (NEURONTIN) 300 MG capsule 90 capsule 1     Sig: Take 1 capsule (300 mg total) by mouth every 8 (eight) hours.    oxyCODONE-acetaminophen (PERCOCET) 7.5-325 mg per tablet 60 tablet 0     Sig: Take 1 tablet by mouth every 12 (twelve) hours as needed for Pain.    oxyCODONE-acetaminophen (PERCOCET) 7.5-325 mg per tablet 60 tablet 0     Sig: Take 1 tablet by mouth every 12 (twelve) hours as needed for Pain.       Assessment:     1. Ankylosing spondylitis of multiple sites in spine     2. Rheumatoid arthritis involving multiple sites with positive rheumatoid factor    3. Encounter for long-term (current) use of other medications         A's of Opioid Risk Assessment  Activity:Patient can perform ADL.   Analgesia:Patients pain is partially controlled by current medication. Patient has tried OTC medications such as Tylenol and Ibuprofen with out relief.   Adverse Effects: Patient denies constipation or sedation.  Aberrant Behavior:  reviewed with no aberrant drug seeking/taking behavior.  Overdose reversal drug naloxone discussed    Drug screen reviewed      Plan:    Narcan January 2023    Follows rheumatology [facility] rheumatoid arthritis, ankylosing spondylitis     Cardiac bypass surgery [facility] postponed due to small-vessel disease, limited options for bypass     Anticoagulated after myocardial infarction    Cardiology has informed her she can not have:  Scope/procedures until further notice due to poor cardiac function    She continues cardiac rehab at home     Considering starting physical therapy/cardiac rehab facility    She continues to deal with chronic back and joint neck pain     Continue home exercise program as directed     Continue current medication     Follow-up 2 months      Dr. Venegas, November 2023     Bring original prescription medication bottles/container/box with labels to each visit    Pill count    Physical therapy    Massage therapy declines

## 2023-06-06 NOTE — PROGRESS NOTES
Subjective:         Patient ID: Cherry Smiley is a 42 y.o. female.    Chief Complaint: Low-back Pain and Leg Pain        Pain  This is a chronic problem. The current episode started more than 1 year ago. The problem occurs daily. The problem has been unchanged. Associated symptoms include arthralgias and neck pain. Pertinent negatives include no anorexia, chest pain, chills, coughing, diaphoresis, fatigue, fever, sore throat, vertigo or vomiting.   Review of Systems   Constitutional:  Negative for activity change, appetite change, chills, diaphoresis, fatigue, fever and unexpected weight change.   HENT:  Negative for drooling, ear discharge, facial swelling, nosebleeds, sore throat, trouble swallowing, voice change and goiter.    Eyes:  Negative for photophobia, pain, discharge, redness and visual disturbance.   Respiratory:  Negative for apnea, cough, choking, chest tightness, shortness of breath, wheezing and stridor.    Cardiovascular:  Negative for chest pain, palpitations and leg swelling.   Gastrointestinal:  Negative for abdominal distention, anorexia, diarrhea, rectal pain, vomiting and fecal incontinence.   Endocrine: Negative for cold intolerance, heat intolerance, polydipsia, polyphagia and polyuria.   Genitourinary:  Negative for flank pain, frequency and hot flashes.   Musculoskeletal:  Positive for arthralgias, back pain, neck pain and neck stiffness.   Integumentary:  Negative for color change and pallor.   Allergic/Immunologic: Negative for immunocompromised state.   Neurological:  Negative for dizziness, vertigo, seizures, syncope, facial asymmetry, speech difficulty, light-headedness, coordination difficulties, memory loss and coordination difficulties.   Hematological:  Negative for adenopathy. Does not bruise/bleed easily.   Psychiatric/Behavioral:  Negative for agitation, behavioral problems, confusion, decreased concentration, dysphoric mood, hallucinations, self-injury and suicidal ideas.  The patient is not nervous/anxious and is not hyperactive.          Past Medical History:   Diagnosis Date    Allergy     Anxiety     Congenital hypothyroidism     Depressive disorder     GERD (gastroesophageal reflux disease)     Hyperlipidemia     Hypertension     Insomnia     Irritable bowel syndrome     Lumbosacral spondylosis     NSTEMI (non-ST elevated myocardial infarction) 05/09/2022    Primary fibromyalgia syndrome     Seizures     Sleep apnea     Type 2 diabetes mellitus      Past Surgical History:   Procedure Laterality Date    ADENOIDECTOMY      ANGIOGRAM, CORONARY, WITH LEFT HEART CATHETERIZATION N/A 5/9/2022    Procedure: Angiogram, Coronary, with Left Heart Cath;  Surgeon: Arthur Dewey MD;  Location: [facility] CATH LAB;  Service: Cardiology;  Laterality: N/A;    EPIDURAL STEROID INJECTION  09/02/2020    C5-6 FABIANO - Dr Venegas    ESOPHAGOGASTRODUODENOSCOPY      HYSTERECTOMY      INJECTION OF FACET JOINT Left 12/27/2018    Left C6-7 Facet Injection - Deb    INJECTION OF FACET JOINT Right 05/19/2019    Right C4-7 Facet Injection X 2 -Deb    INJECTION OF FACET JOINT Left 02/27/2019    Left C4-7 Facet Injection -Deb    knee bilateral      LEFT HEART CATHETERIZATION N/A 9/15/2022    Procedure: Left heart cath;  Surgeon: Arthur Dewey MD;  Location: [facility] CATH LAB;  Service: Cardiology;  Laterality: N/A;    RADIOFREQUENCY THERMOCOAGULATION Right 05/21/2019    Right C4-7 RFTC - Deb    RADIOFREQUENCY THERMOCOAGULATION Left 04/23/2019    Left C4-7 RFTC -Deb    shoulder bilateral      TONSILLECTOMY       Social History     Socioeconomic History    Marital status:    Tobacco Use    Smoking status: Former     Packs/day: 1.00     Years: 35.00     Pack years: 35.00     Types: Cigarettes    Smokeless tobacco: Never   Substance and Sexual Activity    Alcohol use: Not Currently    Drug use: Not Currently     Types: Marijuana     Family History   Problem Relation Age of Onset    Stroke Mother      "Diabetes Mother     Heart disease Mother     Hypertension Mother     Hypertension Father     Heart disease Father     Stroke Father     Lupus Father     Cancer Brother     Diabetes Brother     Hypertension Brother      Review of patient's allergies indicates:   Allergen Reactions    Cinnamon analogues Anaphylaxis    Iodine Anaphylaxis    Morpholine analogues Shortness Of Breath    Shellfish containing products Anaphylaxis    Adhesive Other (See Comments)     Heart monitor gel on leads caused chemical burn     Pamelor [nortriptyline]         Objective:  Vitals:    06/07/23 0921   BP: 110/68   Pulse: 74   Resp: 18   Weight: 79.4 kg (175 lb)   Height: 5' 2" (1.575 m)   PainSc:   6         Physical Exam  Vitals and nursing note reviewed. Exam conducted with a chaperone present.   Constitutional:       General: She is awake. She is not in acute distress.     Appearance: Normal appearance. She is not ill-appearing, toxic-appearing or diaphoretic.   HENT:      Head: Normocephalic and atraumatic.      Nose: Nose normal.      Mouth/Throat:      Mouth: Mucous membranes are moist.      Pharynx: Oropharynx is clear.   Eyes:      Conjunctiva/sclera: Conjunctivae normal.      Pupils: Pupils are equal, round, and reactive to light.   Cardiovascular:      Rate and Rhythm: Normal rate.   Pulmonary:      Effort: Pulmonary effort is normal. No respiratory distress.   Abdominal:      Palpations: Abdomen is soft.   Musculoskeletal:         General: Normal range of motion.      Right shoulder: Tenderness present.      Left shoulder: Tenderness present.      Right wrist: Tenderness present.      Left wrist: Tenderness present.      Cervical back: Normal range of motion and neck supple. Tenderness present.      Thoracic back: Tenderness present.      Lumbar back: Tenderness present.   Skin:     General: Skin is warm and dry.   Neurological:      General: No focal deficit present.      Mental Status: She is alert and oriented to person, " place, and time. Mental status is at baseline.      Cranial Nerves: No cranial nerve deficit (II-XII).   Psychiatric:         Mood and Affect: Mood normal.         Behavior: Behavior normal. Behavior is cooperative.         Thought Content: Thought content normal.         US Abdomen Complete  Narrative: EXAMINATION:  US ABDOMEN COMPLETE    CLINICAL HISTORY:  Abdominal Pain    TECHNIQUE:  Grayscale and color Doppler imaging performed    COMPARISON:  None available    FINDINGS:  The liver is normal in size and echogenicity. The gallbladder is fluid-filled without evidence of stones or sludge. The gallbladder wall thickness is 2.0 mm. The common bile duct measures 2.0 mm.    The visualized portion of the pancreas appear within normal limits    The kidneys are normal in size and echogenicity without hydronephrosis or other abnormality. The right renal length is 11.3 cm. Left renal length is 11.1 cm.    Spleen, aorta and IVC appear within normal limits. No free fluid or free air seen.  Impression: No evidence of abnormality demonstrated.    Ultrasound images stored and captured.    Electronically signed by: Eren James  Date:    02/15/2023  Time:    09:16         Office Visit on 04/06/2023   Component Date Value Ref Range Status    POC Amphetamines 04/06/2023 Negative  Negative, Inconclusive Final    POC Barbiturates 04/06/2023 Negative  Negative, Inconclusive Final    POC Benzodiazepines 04/06/2023 Negative  Negative, Inconclusive Final    POC Cocaine 04/06/2023 Negative  Negative, Inconclusive Final    POC THC 04/06/2023 Negative  Negative, Inconclusive Final    POC Methadone 04/06/2023 Negative  Negative, Inconclusive Final    POC Methamphetamine 04/06/2023 Negative  Negative, Inconclusive Final    POC Opiates 04/06/2023 Negative  Negative, Inconclusive Final    POC Oxycodone 04/06/2023 Presumptive Positive (A)  Negative, Inconclusive Final    POC Phencyclidine 04/06/2023 Negative  Negative, Inconclusive Final     POC Methylenedioxymethamphetamine * 04/06/2023 Negative  Negative, Inconclusive Final    POC Tricyclic Antidepressants 04/06/2023 Negative  Negative, Inconclusive Final    POC Buprenorphine 04/06/2023 Negative   Final     Acceptable 04/06/2023 Yes   Final    POC Temperature (Urine) 04/06/2023 94   Final   Lab Visit on 02/06/2023   Component Date Value Ref Range Status    HCV RNA Detect/Quant 02/06/2023 Undetected  Undetected IU/mL Final    Sodium 02/06/2023 139  136 - 145 mmol/L Final    Potassium 02/06/2023 3.8  3.5 - 5.1 mmol/L Final    Chloride 02/06/2023 107  98 - 107 mmol/L Final    CO2 02/06/2023 29  21 - 32 mmol/L Final    Anion Gap 02/06/2023 7  7 - 16 mmol/L Final    Glucose 02/06/2023 177 (H)  74 - 106 mg/dL Final    BUN 02/06/2023 13  7 - 18 mg/dL Final    Creatinine 02/06/2023 0.98  0.55 - 1.02 mg/dL Final    BUN/Creatinine Ratio 02/06/2023 13  6 - 20 Final    Calcium 02/06/2023 9.7  8.5 - 10.1 mg/dL Final    Total Protein 02/06/2023 7.3  6.4 - 8.2 g/dL Final    Albumin 02/06/2023 3.4 (L)  3.5 - 5.0 g/dL Final    Globulin 02/06/2023 3.9  2.0 - 4.0 g/dL Final    A/G Ratio 02/06/2023 0.9   Final    Bilirubin, Total 02/06/2023 0.2  >0.0 - 1.2 mg/dL Final    Alk Phos 02/06/2023 71  37 - 98 U/L Final    ALT 02/06/2023 22  13 - 56 U/L Final    AST 02/06/2023 11 (L)  15 - 37 U/L Final    eGFR 02/06/2023 73  >=60 mL/min/1.73m² Final    WBC 02/06/2023 6.04  4.50 - 11.00 K/uL Final    RBC 02/06/2023 3.70 (L)  4.20 - 5.40 M/uL Final    Hemoglobin 02/06/2023 12.8  12.0 - 16.0 g/dL Final    Hematocrit 02/06/2023 38.9  38.0 - 47.0 % Final    MCV 02/06/2023 105.1 (H)  80.0 - 96.0 fL Final    MCH 02/06/2023 34.6 (H)  27.0 - 31.0 pg Final    MCHC 02/06/2023 32.9  32.0 - 36.0 g/dL Final    RDW 02/06/2023 16.3 (H)  11.5 - 14.5 % Final    Platelet Count 02/06/2023 462 (H)  150 - 400 K/uL Final    MPV 02/06/2023 10.0  9.4 - 12.4 fL Final    Neutrophils % 02/06/2023 58.7  53.0 - 65.0 % Final    Lymphocytes %  02/06/2023 25.2 (L)  27.0 - 41.0 % Final    Monocytes % 02/06/2023 8.6 (H)  2.0 - 6.0 % Final    Eosinophils % 02/06/2023 5.8 (H)  1.0 - 4.0 % Final    Basophils % 02/06/2023 1.2 (H)  0.0 - 1.0 % Final    Immature Granulocytes % 02/06/2023 0.5 (H)  0.0 - 0.4 % Final    nRBC, Auto 02/06/2023 0.0  <=0.0 % Final    Neutrophils, Abs 02/06/2023 3.55  1.80 - 7.70 K/uL Final    Lymphocytes, Absolute 02/06/2023 1.52  1.00 - 4.80 K/uL Final    Monocytes, Absolute 02/06/2023 0.52  0.00 - 0.80 K/uL Final    Eosinophils, Absolute 02/06/2023 0.35  0.00 - 0.50 K/uL Final    Basophils, Absolute 02/06/2023 0.07  0.00 - 0.20 K/uL Final    Immature Granulocytes, Absolute 02/06/2023 0.03  0.00 - 0.04 K/uL Final    nRBC, Absolute 02/06/2023 0.00  <=0.00 x10e3/uL Final    Diff Type 02/06/2023 Scan Smear   Final    Platelet Morphology 02/06/2023 Large & Giant Platelets (A)  Normal Final    Anisocytosis 02/06/2023 1+   Final    Macrocytosis 02/06/2023 1+   Final    Polychromasia 02/06/2023 Few   Final   Office Visit on 01/31/2023   Component Date Value Ref Range Status    POC Amphetamines 01/31/2023 Negative  Negative, Inconclusive Final    POC Barbiturates 01/31/2023 Negative  Negative, Inconclusive Final    POC Benzodiazepines 01/31/2023 Negative  Negative, Inconclusive Final    POC Cocaine 01/31/2023 Negative  Negative, Inconclusive Final    POC THC 01/31/2023 Negative  Negative, Inconclusive Final    POC Methadone 01/31/2023 Negative  Negative, Inconclusive Final    POC Methamphetamine 01/31/2023 Negative  Negative, Inconclusive Final    POC Opiates 01/31/2023 Presumptive Positive (A)  Negative, Inconclusive Final    POC Oxycodone 01/31/2023 Negative  Negative, Inconclusive Final    POC Phencyclidine 01/31/2023 Negative  Negative, Inconclusive Final    POC Methylenedioxymethamphetamine * 01/31/2023 Negative  Negative, Inconclusive Final    POC Tricyclic Antidepressants 01/31/2023 Negative  Negative, Inconclusive Final    POC  Buprenorphine 01/31/2023 Negative   Final     Acceptable 01/31/2023 Yes   Final    POC Temperature (Urine) 01/31/2023 90   Final         No orders of the defined types were placed in this encounter.      Requested Prescriptions     Signed Prescriptions Disp Refills    gabapentin (NEURONTIN) 300 MG capsule 90 capsule 1     Sig: Take 1 capsule (300 mg total) by mouth every 8 (eight) hours.    oxyCODONE-acetaminophen (PERCOCET) 7.5-325 mg per tablet 60 tablet 0     Sig: Take 1 tablet by mouth every 12 (twelve) hours as needed for Pain.    oxyCODONE-acetaminophen (PERCOCET) 7.5-325 mg per tablet 60 tablet 0     Sig: Take 1 tablet by mouth every 12 (twelve) hours as needed for Pain.       Assessment:     1. Rheumatoid arthritis involving multiple sites with positive rheumatoid factor    2. Ankylosing spondylitis of multiple sites in spine         A's of Opioid Risk Assessment  Activity:Patient can perform ADL.   Analgesia:Patients pain is partially controlled by current medication. Patient has tried OTC medications such as Tylenol and Ibuprofen with out relief.   Adverse Effects: Patient denies constipation or sedation.  Aberrant Behavior:  reviewed with no aberrant drug seeking/taking behavior.  Overdose reversal drug naloxone discussed    Drug screen reviewed      Plan:    Narcan January 2023    Follows rheumatology [facility] rheumatoid arthritis, ankylosing spondylitis     Cardiac bypass surgery [facility] postponed due to small-vessel disease, limited options for bypass     Anticoagulated after myocardial infarction    Cardiology has informed her she can not have:  Scope/procedures until further notice due to poor cardiac function    Follows orthopedics [facility]    Chronic back and joint neck pain, multiple falls    She states current medication does help with her discomfort     Continue home exercise program as directed     Continue current medication     Follow-up 2 months        Theo, November 2023     Bring original prescription medication bottles/container/box with labels to each visit

## 2023-06-07 ENCOUNTER — OFFICE VISIT (OUTPATIENT)
Dept: PAIN MEDICINE | Facility: CLINIC | Age: 43
End: 2023-06-07
Payer: MEDICARE

## 2023-06-07 VITALS
DIASTOLIC BLOOD PRESSURE: 68 MMHG | BODY MASS INDEX: 32.2 KG/M2 | WEIGHT: 175 LBS | HEART RATE: 74 BPM | RESPIRATION RATE: 18 BRPM | SYSTOLIC BLOOD PRESSURE: 110 MMHG | HEIGHT: 62 IN

## 2023-06-07 DIAGNOSIS — M45.0 ANKYLOSING SPONDYLITIS OF MULTIPLE SITES IN SPINE: Chronic | ICD-10-CM

## 2023-06-07 DIAGNOSIS — M05.79 RHEUMATOID ARTHRITIS INVOLVING MULTIPLE SITES WITH POSITIVE RHEUMATOID FACTOR: Primary | Chronic | ICD-10-CM

## 2023-06-07 PROCEDURE — 99214 PR OFFICE/OUTPT VISIT, EST, LEVL IV, 30-39 MIN: ICD-10-PCS | Mod: S$PBB,,, | Performed by: PHYSICIAN ASSISTANT

## 2023-06-07 PROCEDURE — 99214 OFFICE O/P EST MOD 30 MIN: CPT | Mod: S$PBB,,, | Performed by: PHYSICIAN ASSISTANT

## 2023-06-07 PROCEDURE — 99215 OFFICE O/P EST HI 40 MIN: CPT | Mod: PBBFAC | Performed by: PHYSICIAN ASSISTANT

## 2023-06-07 RX ORDER — OXYCODONE AND ACETAMINOPHEN 7.5; 325 MG/1; MG/1
1 TABLET ORAL EVERY 12 HOURS PRN
Qty: 60 TABLET | Refills: 0 | Status: SHIPPED | OUTPATIENT
Start: 2023-06-09 | End: 2023-07-09

## 2023-06-07 RX ORDER — GABAPENTIN 300 MG/1
300 CAPSULE ORAL EVERY 8 HOURS
Qty: 90 CAPSULE | Refills: 1 | Status: SHIPPED | OUTPATIENT
Start: 2023-06-07 | End: 2023-08-02 | Stop reason: SDUPTHER

## 2023-06-07 RX ORDER — OXYCODONE AND ACETAMINOPHEN 7.5; 325 MG/1; MG/1
1 TABLET ORAL EVERY 12 HOURS PRN
Qty: 60 TABLET | Refills: 0 | Status: SHIPPED | OUTPATIENT
Start: 2023-07-09 | End: 2023-08-02

## 2023-06-19 RX ORDER — ATORVASTATIN CALCIUM 80 MG/1
TABLET, FILM COATED ORAL
Qty: 90 TABLET | Refills: 0 | Status: SHIPPED | OUTPATIENT
Start: 2023-06-19

## 2023-06-27 RX ORDER — METOPROLOL SUCCINATE 200 MG/1
TABLET, EXTENDED RELEASE ORAL
Qty: 90 TABLET | Refills: 0 | Status: SHIPPED | OUTPATIENT
Start: 2023-06-27 | End: 2023-08-07

## 2023-08-02 ENCOUNTER — OFFICE VISIT (OUTPATIENT)
Dept: PAIN MEDICINE | Facility: CLINIC | Age: 43
End: 2023-08-02
Payer: MEDICARE

## 2023-08-02 VITALS
BODY MASS INDEX: 31.1 KG/M2 | WEIGHT: 169 LBS | HEART RATE: 72 BPM | DIASTOLIC BLOOD PRESSURE: 61 MMHG | RESPIRATION RATE: 20 BRPM | HEIGHT: 62 IN | SYSTOLIC BLOOD PRESSURE: 86 MMHG

## 2023-08-02 DIAGNOSIS — M05.79 RHEUMATOID ARTHRITIS INVOLVING MULTIPLE SITES WITH POSITIVE RHEUMATOID FACTOR: Primary | Chronic | ICD-10-CM

## 2023-08-02 DIAGNOSIS — Z79.899 ENCOUNTER FOR LONG-TERM (CURRENT) USE OF OTHER MEDICATIONS: ICD-10-CM

## 2023-08-02 DIAGNOSIS — M45.0 ANKYLOSING SPONDYLITIS OF MULTIPLE SITES IN SPINE: Chronic | ICD-10-CM

## 2023-08-02 PROCEDURE — 80305 DRUG TEST PRSMV DIR OPT OBS: CPT | Mod: PBBFAC | Performed by: PHYSICIAN ASSISTANT

## 2023-08-02 PROCEDURE — 99214 OFFICE O/P EST MOD 30 MIN: CPT | Mod: S$PBB,,, | Performed by: PHYSICIAN ASSISTANT

## 2023-08-02 PROCEDURE — 99215 OFFICE O/P EST HI 40 MIN: CPT | Mod: PBBFAC | Performed by: PHYSICIAN ASSISTANT

## 2023-08-02 PROCEDURE — 99214 PR OFFICE/OUTPT VISIT, EST, LEVL IV, 30-39 MIN: ICD-10-PCS | Mod: S$PBB,,, | Performed by: PHYSICIAN ASSISTANT

## 2023-08-02 RX ORDER — HYDROCODONE BITARTRATE AND ACETAMINOPHEN 10; 325 MG/1; MG/1
1 TABLET ORAL EVERY 8 HOURS PRN
Qty: 90 TABLET | Refills: 0 | Status: SHIPPED | OUTPATIENT
Start: 2023-08-08 | End: 2023-10-11 | Stop reason: SDUPTHER

## 2023-08-02 RX ORDER — GABAPENTIN 300 MG/1
300 CAPSULE ORAL EVERY 8 HOURS
Qty: 90 CAPSULE | Refills: 1 | Status: SHIPPED | OUTPATIENT
Start: 2023-08-02 | End: 2023-10-11 | Stop reason: SDUPTHER

## 2023-08-02 RX ORDER — OXYCODONE AND ACETAMINOPHEN 7.5; 325 MG/1; MG/1
1 TABLET ORAL EVERY 12 HOURS PRN
Qty: 60 TABLET | Refills: 0 | Status: CANCELLED | OUTPATIENT
Start: 2023-08-02 | End: 2023-09-01

## 2023-08-02 RX ORDER — HYDROCODONE BITARTRATE AND ACETAMINOPHEN 10; 325 MG/1; MG/1
1 TABLET ORAL EVERY 8 HOURS PRN
Qty: 90 TABLET | Refills: 0 | Status: SHIPPED | OUTPATIENT
Start: 2023-09-07 | End: 2023-10-11 | Stop reason: SDUPTHER

## 2023-08-02 NOTE — PROGRESS NOTES
Subjective:         Patient ID: Cherry Smiley is a 42 y.o. female.    Chief Complaint: Hip Pain, Low-back Pain, and Knee Pain        Pain  This is a chronic problem. The current episode started more than 1 year ago. The problem occurs daily. The problem has been waxing and waning. Associated symptoms include arthralgias and neck pain. Pertinent negatives include no anorexia, chest pain, chills, coughing, diaphoresis, fever, sore throat, vertigo or vomiting.     Review of Systems   Constitutional:  Negative for activity change, appetite change, chills, diaphoresis, fever and unexpected weight change.   HENT:  Negative for drooling, ear discharge, facial swelling, nosebleeds, sore throat, trouble swallowing, voice change and goiter.    Eyes:  Negative for photophobia, pain, discharge, redness and visual disturbance.   Respiratory:  Negative for apnea, cough, choking, chest tightness, shortness of breath, wheezing and stridor.    Cardiovascular:  Negative for chest pain, palpitations and leg swelling.   Gastrointestinal:  Negative for abdominal distention, anorexia, diarrhea, rectal pain, vomiting and fecal incontinence.   Endocrine: Negative for cold intolerance, heat intolerance, polydipsia, polyphagia and polyuria.   Genitourinary:  Negative for flank pain, frequency and hot flashes.   Musculoskeletal:  Positive for arthralgias, back pain, neck pain and neck stiffness.   Integumentary:  Negative for color change and pallor.   Allergic/Immunologic: Negative for immunocompromised state.   Neurological:  Negative for dizziness, vertigo, seizures, syncope, facial asymmetry, speech difficulty, light-headedness, coordination difficulties, memory loss and coordination difficulties.   Hematological:  Negative for adenopathy. Does not bruise/bleed easily.   Psychiatric/Behavioral:  Negative for agitation, behavioral problems, confusion, decreased concentration, dysphoric mood, hallucinations, self-injury and suicidal  ideas. The patient is not nervous/anxious and is not hyperactive.            Past Medical History:   Diagnosis Date    Allergy     Anxiety     Congenital hypothyroidism     Depressive disorder     GERD (gastroesophageal reflux disease)     Hyperlipidemia     Hypertension     Insomnia     Irritable bowel syndrome     Lumbosacral spondylosis     NSTEMI (non-ST elevated myocardial infarction) 05/09/2022    Primary fibromyalgia syndrome     Seizures     Sleep apnea     Type 2 diabetes mellitus      Past Surgical History:   Procedure Laterality Date    ADENOIDECTOMY      ANGIOGRAM, CORONARY, WITH LEFT HEART CATHETERIZATION N/A 5/9/2022    Procedure: Angiogram, Coronary, with Left Heart Cath;  Surgeon: Arthur Dewey MD;  Location: [facility] CATH LAB;  Service: Cardiology;  Laterality: N/A;    EPIDURAL STEROID INJECTION  09/02/2020    C5-6 [facility] - Dr Venegas    ESOPHAGOGASTRODUODENOSCOPY      HYSTERECTOMY      INJECTION OF FACET JOINT Left 12/27/2018    Left C6-7 Facet Injection - Deb    INJECTION OF FACET JOINT Right 05/19/2019    Right C4-7 Facet Injection X 2 -Deb    INJECTION OF FACET JOINT Left 02/27/2019    Left C4-7 Facet Injection -Deb    knee bilateral      LEFT HEART CATHETERIZATION N/A 9/15/2022    Procedure: Left heart cath;  Surgeon: Arthur Dewey MD;  Location: [facility] CATH LAB;  Service: Cardiology;  Laterality: N/A;    RADIOFREQUENCY THERMOCOAGULATION Right 05/21/2019    Right C4-7 RFTC - Deb    RADIOFREQUENCY THERMOCOAGULATION Left 04/23/2019    Left C4-7 RFTC -Deb    shoulder bilateral      TONSILLECTOMY       Social History     Socioeconomic History    Marital status:    Tobacco Use    Smoking status: Former     Current packs/day: 1.00     Average packs/day: 1 pack/day for 35.0 years (35.0 ttl pk-yrs)     Types: Cigarettes    Smokeless tobacco: Never   Substance and Sexual Activity    Alcohol use: Not Currently    Drug use: Not Currently     Types: Marijuana     Family History   Problem  "Relation Age of Onset    Stroke Mother     Diabetes Mother     Heart disease Mother     Hypertension Mother     Hypertension Father     Heart disease Father     Stroke Father     Lupus Father     Cancer Brother     Diabetes Brother     Hypertension Brother      Review of patient's allergies indicates:   Allergen Reactions    Cinnamon analogues Anaphylaxis    Iodine Anaphylaxis    Morpholine analogues Shortness Of Breath    Shellfish containing products Anaphylaxis    Adhesive Other (See Comments)     Heart monitor gel on leads caused chemical burn     Pamelor [nortriptyline]         Objective:  Vitals:    08/02/23 1319 08/02/23 1324   BP: (!) 86/61    Pulse: 72    Resp: 20    Weight: 76.7 kg (169 lb)    Height: 5' 2" (1.575 m)    PainSc:   6   6         Physical Exam  Vitals and nursing note reviewed. Exam conducted with a chaperone present.   Constitutional:       General: She is awake. She is not in acute distress.     Appearance: Normal appearance. She is not ill-appearing, toxic-appearing or diaphoretic.   HENT:      Head: Normocephalic and atraumatic.      Nose: Nose normal.      Mouth/Throat:      Mouth: Mucous membranes are moist.      Pharynx: Oropharynx is clear.   Eyes:      Conjunctiva/sclera: Conjunctivae normal.      Pupils: Pupils are equal, round, and reactive to light.   Cardiovascular:      Rate and Rhythm: Normal rate.   Pulmonary:      Effort: Pulmonary effort is normal. No respiratory distress.   Abdominal:      Palpations: Abdomen is soft.   Musculoskeletal:         General: Normal range of motion.      Right shoulder: Tenderness present.      Left shoulder: Tenderness present.      Right wrist: Tenderness present.      Left wrist: Tenderness present.      Cervical back: Normal range of motion and neck supple. Tenderness present.      Thoracic back: Tenderness present.      Lumbar back: Tenderness present.   Skin:     General: Skin is warm and dry.   Neurological:      General: No focal deficit " present.      Mental Status: She is alert and oriented to person, place, and time. Mental status is at baseline.      Cranial Nerves: No cranial nerve deficit (II-XII).   Psychiatric:         Mood and Affect: Mood normal.         Behavior: Behavior normal. Behavior is cooperative.         Thought Content: Thought content normal.         US Abdomen Complete  Narrative: EXAMINATION:  US ABDOMEN COMPLETE    CLINICAL HISTORY:  Abdominal Pain    TECHNIQUE:  Grayscale and color Doppler imaging performed    COMPARISON:  None available    FINDINGS:  The liver is normal in size and echogenicity. The gallbladder is fluid-filled without evidence of stones or sludge. The gallbladder wall thickness is 2.0 mm. The common bile duct measures 2.0 mm.    The visualized portion of the pancreas appear within normal limits    The kidneys are normal in size and echogenicity without hydronephrosis or other abnormality. The right renal length is 11.3 cm. Left renal length is 11.1 cm.    Spleen, aorta and IVC appear within normal limits. No free fluid or free air seen.  Impression: No evidence of abnormality demonstrated.    Ultrasound images stored and captured.    Electronically signed by: Eren James  Date:    02/15/2023  Time:    09:16         Office Visit on 04/06/2023   Component Date Value Ref Range Status    POC Amphetamines 04/06/2023 Negative  Negative, Inconclusive Final    POC Barbiturates 04/06/2023 Negative  Negative, Inconclusive Final    POC Benzodiazepines 04/06/2023 Negative  Negative, Inconclusive Final    POC Cocaine 04/06/2023 Negative  Negative, Inconclusive Final    POC THC 04/06/2023 Negative  Negative, Inconclusive Final    POC Methadone 04/06/2023 Negative  Negative, Inconclusive Final    POC Methamphetamine 04/06/2023 Negative  Negative, Inconclusive Final    POC Opiates 04/06/2023 Negative  Negative, Inconclusive Final    POC Oxycodone 04/06/2023 Presumptive Positive (A)  Negative, Inconclusive Final    POC  Phencyclidine 04/06/2023 Negative  Negative, Inconclusive Final    POC Methylenedioxymethamphetamine * 04/06/2023 Negative  Negative, Inconclusive Final    POC Tricyclic Antidepressants 04/06/2023 Negative  Negative, Inconclusive Final    POC Buprenorphine 04/06/2023 Negative   Final     Acceptable 04/06/2023 Yes   Final    POC Temperature (Urine) 04/06/2023 94   Final   Lab Visit on 02/06/2023   Component Date Value Ref Range Status    HCV RNA Detect/Quant 02/06/2023 Undetected  Undetected IU/mL Final    Sodium 02/06/2023 139  136 - 145 mmol/L Final    Potassium 02/06/2023 3.8  3.5 - 5.1 mmol/L Final    Chloride 02/06/2023 107  98 - 107 mmol/L Final    CO2 02/06/2023 29  21 - 32 mmol/L Final    Anion Gap 02/06/2023 7  7 - 16 mmol/L Final    Glucose 02/06/2023 177 (H)  74 - 106 mg/dL Final    BUN 02/06/2023 13  7 - 18 mg/dL Final    Creatinine 02/06/2023 0.98  0.55 - 1.02 mg/dL Final    BUN/Creatinine Ratio 02/06/2023 13  6 - 20 Final    Calcium 02/06/2023 9.7  8.5 - 10.1 mg/dL Final    Total Protein 02/06/2023 7.3  6.4 - 8.2 g/dL Final    Albumin 02/06/2023 3.4 (L)  3.5 - 5.0 g/dL Final    Globulin 02/06/2023 3.9  2.0 - 4.0 g/dL Final    A/G Ratio 02/06/2023 0.9   Final    Bilirubin, Total 02/06/2023 0.2  >0.0 - 1.2 mg/dL Final    Alk Phos 02/06/2023 71  37 - 98 U/L Final    ALT 02/06/2023 22  13 - 56 U/L Final    AST 02/06/2023 11 (L)  15 - 37 U/L Final    eGFR 02/06/2023 73  >=60 mL/min/1.73m² Final    WBC 02/06/2023 6.04  4.50 - 11.00 K/uL Final    RBC 02/06/2023 3.70 (L)  4.20 - 5.40 M/uL Final    Hemoglobin 02/06/2023 12.8  12.0 - 16.0 g/dL Final    Hematocrit 02/06/2023 38.9  38.0 - 47.0 % Final    MCV 02/06/2023 105.1 (H)  80.0 - 96.0 fL Final    MCH 02/06/2023 34.6 (H)  27.0 - 31.0 pg Final    MCHC 02/06/2023 32.9  32.0 - 36.0 g/dL Final    RDW 02/06/2023 16.3 (H)  11.5 - 14.5 % Final    Platelet Count 02/06/2023 462 (H)  150 - 400 K/uL Final    MPV 02/06/2023 10.0  9.4 - 12.4 fL Final     Neutrophils % 02/06/2023 58.7  53.0 - 65.0 % Final    Lymphocytes % 02/06/2023 25.2 (L)  27.0 - 41.0 % Final    Monocytes % 02/06/2023 8.6 (H)  2.0 - 6.0 % Final    Eosinophils % 02/06/2023 5.8 (H)  1.0 - 4.0 % Final    Basophils % 02/06/2023 1.2 (H)  0.0 - 1.0 % Final    Immature Granulocytes % 02/06/2023 0.5 (H)  0.0 - 0.4 % Final    nRBC, Auto 02/06/2023 0.0  <=0.0 % Final    Neutrophils, Abs 02/06/2023 3.55  1.80 - 7.70 K/uL Final    Lymphocytes, Absolute 02/06/2023 1.52  1.00 - 4.80 K/uL Final    Monocytes, Absolute 02/06/2023 0.52  0.00 - 0.80 K/uL Final    Eosinophils, Absolute 02/06/2023 0.35  0.00 - 0.50 K/uL Final    Basophils, Absolute 02/06/2023 0.07  0.00 - 0.20 K/uL Final    Immature Granulocytes, Absolute 02/06/2023 0.03  0.00 - 0.04 K/uL Final    nRBC, Absolute 02/06/2023 0.00  <=0.00 x10e3/uL Final    Diff Type 02/06/2023 Scan Smear   Final    Platelet Morphology 02/06/2023 Large & Giant Platelets (A)  Normal Final    Anisocytosis 02/06/2023 1+   Final    Macrocytosis 02/06/2023 1+   Final    Polychromasia 02/06/2023 Few   Final         Orders Placed This Encounter   Procedures    POCT Urine Drug Screen Presump     Interpretive Information:     Negative:  No drug detected at the cut off level.   Positive:  This result represents presumptive positive for the   tested drug, other substances may yield a positive response other   than the analyte of interest. This result should be utilized for   diagnostic purpose only. Confirmation testing will be performed upon physician request only.          Requested Prescriptions     Pending Prescriptions Disp Refills    oxyCODONE-acetaminophen (PERCOCET) 7.5-325 mg per tablet 60 tablet 0     Sig: Take 1 tablet by mouth every 12 (twelve) hours as needed for Pain.       Assessment:     1. Rheumatoid arthritis involving multiple sites with positive rheumatoid factor    2. Ankylosing spondylitis of multiple sites in spine    3. Encounter for long-term (current) use  of other medications         A's of Opioid Risk Assessment  Activity:Patient can perform ADL.   Analgesia:Patients pain is partially controlled by current medication. Patient has tried OTC medications such as Tylenol and Ibuprofen with out relief.   Adverse Effects: Patient denies constipation or sedation.  Aberrant Behavior:  reviewed with no aberrant drug seeking/taking behavior.  Overdose reversal drug naloxone discussed    Drug screen reviewed      Plan:    Narcan January 2023    Follows rheumatology [facility] rheumatoid arthritis, ankylosing spondylitis     Cardiac bypass surgery [facility] postponed due to small-vessel disease, limited options for bypass     Anticoagulated after myocardial infarction    Cardiology has informed her she can not have:  Scope/procedures until further notice due to poor cardiac function    Has upcoming cardiac catheterization for further cardiac issues concerned stent has dislodged    Follows orthopedics [facility]    Complaint increasing nausea with her Percocet    After discussing options Will try Norco     Discontinue Percocet     Norco 10 1 p.o. q.8 hours     Continue home exercise program as directed     Follow-up 2 months      Dr. Venegas, November 2023     Bring original prescription medication bottles/container/box with labels to each visit

## 2023-08-07 RX ORDER — METOPROLOL SUCCINATE 200 MG/1
TABLET, EXTENDED RELEASE ORAL
Qty: 90 TABLET | Refills: 0 | Status: SHIPPED | OUTPATIENT
Start: 2023-08-07 | End: 2024-01-08

## 2023-08-22 DIAGNOSIS — M05.79 RHEUMATOID ARTHRITIS INVOLVING MULTIPLE SITES WITH POSITIVE RHEUMATOID FACTOR: Chronic | ICD-10-CM

## 2023-08-22 DIAGNOSIS — M45.0 ANKYLOSING SPONDYLITIS OF MULTIPLE SITES IN SPINE: ICD-10-CM

## 2023-08-22 RX ORDER — FOLIC ACID 1 MG/1
1 TABLET ORAL DAILY
Qty: 100 TABLET | Refills: 1 | Status: SHIPPED | OUTPATIENT
Start: 2023-08-22 | End: 2024-02-01 | Stop reason: SDUPTHER

## 2023-08-28 ENCOUNTER — PATIENT MESSAGE (OUTPATIENT)
Dept: PAIN MEDICINE | Facility: CLINIC | Age: 43
End: 2023-08-28
Payer: MEDICARE

## 2023-10-02 ENCOUNTER — PATIENT MESSAGE (OUTPATIENT)
Dept: PAIN MEDICINE | Facility: CLINIC | Age: 43
End: 2023-10-02
Payer: MEDICARE

## 2023-10-02 ENCOUNTER — PATIENT MESSAGE (OUTPATIENT)
Dept: RHEUMATOLOGY | Facility: CLINIC | Age: 43
End: 2023-10-02
Payer: MEDICARE

## 2023-10-11 NOTE — PROGRESS NOTES
Subjective:         Patient ID: Cherry Smiley is a 43 y.o. female.    Chief Complaint: Low-back Pain and Neck Pain        Pain  This is a chronic problem. The current episode started more than 1 year ago. The problem occurs daily. The problem has been waxing and waning. Associated symptoms include arthralgias and neck pain. Pertinent negatives include no anorexia, chest pain, chills, coughing, diaphoresis, fever, sore throat, vertigo or vomiting.     Review of Systems   Constitutional:  Negative for activity change, appetite change, chills, diaphoresis, fever and unexpected weight change.   HENT:  Negative for drooling, ear discharge, facial swelling, nosebleeds, sore throat, trouble swallowing, voice change and goiter.    Eyes:  Negative for photophobia, pain, discharge, redness and visual disturbance.   Respiratory:  Negative for apnea, cough, choking, chest tightness, shortness of breath, wheezing and stridor.    Cardiovascular:  Negative for chest pain, palpitations and leg swelling.   Gastrointestinal:  Negative for abdominal distention, anorexia, diarrhea, rectal pain, vomiting and fecal incontinence.   Endocrine: Negative for cold intolerance, heat intolerance, polydipsia, polyphagia and polyuria.   Genitourinary:  Negative for flank pain, frequency and hot flashes.   Musculoskeletal:  Positive for arthralgias, back pain, neck pain and neck stiffness.   Integumentary:  Negative for color change and pallor.   Allergic/Immunologic: Negative for immunocompromised state.   Neurological:  Negative for dizziness, vertigo, seizures, syncope, facial asymmetry, speech difficulty, light-headedness, memory loss and coordination difficulties.   Hematological:  Negative for adenopathy. Does not bruise/bleed easily.   Psychiatric/Behavioral:  Negative for agitation, behavioral problems, confusion, decreased concentration, dysphoric mood, hallucinations, self-injury and suicidal ideas. The patient is not nervous/anxious  and is not hyperactive.            Past Medical History:   Diagnosis Date    Allergy     Anxiety     Congenital hypothyroidism     Depressive disorder     GERD (gastroesophageal reflux disease)     Hyperlipidemia     Hypertension     Insomnia     Irritable bowel syndrome     Lumbosacral spondylosis     NSTEMI (non-ST elevated myocardial infarction) 05/09/2022    Primary fibromyalgia syndrome     Seizures     Sleep apnea     Type 2 diabetes mellitus      Past Surgical History:   Procedure Laterality Date    ADENOIDECTOMY      ANGIOGRAM, CORONARY, WITH LEFT HEART CATHETERIZATION N/A 5/9/2022    Procedure: Angiogram, Coronary, with Left Heart Cath;  Surgeon: Arthur Dewey MD;  Location: [facility] CATH LAB;  Service: Cardiology;  Laterality: N/A;    EPIDURAL STEROID INJECTION  09/02/2020    C5-6 [facility] - Dr Venegas    ESOPHAGOGASTRODUODENOSCOPY      HYSTERECTOMY      INJECTION OF FACET JOINT Left 12/27/2018    Left C6-7 Facet Injection - Deb    INJECTION OF FACET JOINT Right 05/19/2019    Right C4-7 Facet Injection X 2 -Deb    INJECTION OF FACET JOINT Left 02/27/2019    Left C4-7 Facet Injection -Deb    knee bilateral      LEFT HEART CATHETERIZATION N/A 9/15/2022    Procedure: Left heart cath;  Surgeon: Arthur Dewey MD;  Location: [facility] CATH LAB;  Service: Cardiology;  Laterality: N/A;    RADIOFREQUENCY THERMOCOAGULATION Right 05/21/2019    Right C4-7 RFTC - Deb    RADIOFREQUENCY THERMOCOAGULATION Left 04/23/2019    Left C4-7 RFTC -Deb    shoulder bilateral      TONSILLECTOMY       Social History     Socioeconomic History    Marital status:    Tobacco Use    Smoking status: Former     Current packs/day: 1.00     Average packs/day: 1 pack/day for 35.0 years (35.0 ttl pk-yrs)     Types: Cigarettes    Smokeless tobacco: Never   Substance and Sexual Activity    Alcohol use: Not Currently    Drug use: Not Currently     Types: Marijuana     Family History   Problem Relation Age of Onset    Stroke Mother   "   Diabetes Mother     Heart disease Mother     Hypertension Mother     Hypertension Father     Heart disease Father     Stroke Father     Lupus Father     Cancer Brother     Diabetes Brother     Hypertension Brother      Review of patient's allergies indicates:   Allergen Reactions    Cinnamon analogues Anaphylaxis    Iodine Anaphylaxis    Morpholine analogues Shortness Of Breath    Shellfish containing products Anaphylaxis    Adhesive Other (See Comments)     Heart monitor gel on leads caused chemical burn     Pamelor [nortriptyline]         Objective:  Vitals:    10/12/23 1019   BP: 91/66   Pulse: 79   Resp: 18   Weight: 73.9 kg (163 lb)   Height: 5' 2" (1.575 m)   PainSc:   7         Physical Exam  Vitals and nursing note reviewed. Exam conducted with a chaperone present.   Constitutional:       General: She is awake. She is not in acute distress.     Appearance: Normal appearance. She is not ill-appearing, toxic-appearing or diaphoretic.   HENT:      Head: Normocephalic and atraumatic.      Nose: Nose normal.      Mouth/Throat:      Mouth: Mucous membranes are moist.      Pharynx: Oropharynx is clear.   Eyes:      Conjunctiva/sclera: Conjunctivae normal.      Pupils: Pupils are equal, round, and reactive to light.   Cardiovascular:      Rate and Rhythm: Normal rate.   Pulmonary:      Effort: Pulmonary effort is normal. No respiratory distress.   Abdominal:      Palpations: Abdomen is soft.   Musculoskeletal:         General: Normal range of motion.      Right shoulder: Tenderness present.      Left shoulder: Tenderness present.      Right wrist: Tenderness present.      Left wrist: Tenderness present.      Cervical back: Normal range of motion and neck supple. Tenderness present.      Thoracic back: Tenderness present.      Lumbar back: Tenderness present.   Skin:     General: Skin is warm and dry.   Neurological:      General: No focal deficit present.      Mental Status: She is alert and oriented to person, " place, and time. Mental status is at baseline.      Cranial Nerves: No cranial nerve deficit (II-XII).   Psychiatric:         Mood and Affect: Mood normal.         Behavior: Behavior normal. Behavior is cooperative.         Thought Content: Thought content normal.           US Abdomen Complete  Narrative: EXAMINATION:  US ABDOMEN COMPLETE    CLINICAL HISTORY:  Abdominal Pain    TECHNIQUE:  Grayscale and color Doppler imaging performed    COMPARISON:  None available    FINDINGS:  The liver is normal in size and echogenicity. The gallbladder is fluid-filled without evidence of stones or sludge. The gallbladder wall thickness is 2.0 mm. The common bile duct measures 2.0 mm.    The visualized portion of the pancreas appear within normal limits    The kidneys are normal in size and echogenicity without hydronephrosis or other abnormality. The right renal length is 11.3 cm. Left renal length is 11.1 cm.    Spleen, aorta and IVC appear within normal limits. No free fluid or free air seen.  Impression: No evidence of abnormality demonstrated.    Ultrasound images stored and captured.    Electronically signed by: Eren James  Date:    02/15/2023  Time:    09:16         Office Visit on 08/02/2023   Component Date Value Ref Range Status    POC Amphetamines 08/02/2023 Negative  Negative, Inconclusive Final    POC Barbiturates 08/02/2023 Negative  Negative, Inconclusive Final    POC Benzodiazepines 08/02/2023 Negative  Negative, Inconclusive Final    POC Cocaine 08/02/2023 Negative  Negative, Inconclusive Final    POC THC 08/02/2023 Negative  Negative, Inconclusive Final    POC Methadone 08/02/2023 Negative  Negative, Inconclusive Final    POC Methamphetamine 08/02/2023 Negative  Negative, Inconclusive Final    POC Opiates 08/02/2023 Presumptive Positive (A)  Negative, Inconclusive Final    POC Oxycodone 08/02/2023 Negative  Negative, Inconclusive Final    POC Phencyclidine 08/02/2023 Negative  Negative, Inconclusive Final     POC Methylenedioxymethamphetamine * 08/02/2023 Negative  Negative, Inconclusive Final    POC Tricyclic Antidepressants 08/02/2023 Negative  Negative, Inconclusive Final    POC Buprenorphine 08/02/2023 Negative   Final     Acceptable 08/02/2023 Yes   Final    POC Temperature (Urine) 08/02/2023 90   Final         No orders of the defined types were placed in this encounter.      Requested Prescriptions     Signed Prescriptions Disp Refills    HYDROcodone-acetaminophen (NORCO)  mg per tablet 90 tablet 0     Sig: Take 1 tablet by mouth every 8 (eight) hours as needed for Pain.    HYDROcodone-acetaminophen (NORCO)  mg per tablet 90 tablet 0     Sig: Take 1 tablet by mouth every 8 (eight) hours as needed for Pain.    gabapentin (NEURONTIN) 300 MG capsule 90 capsule 1     Sig: Take 1 capsule (300 mg total) by mouth every 8 (eight) hours.       Assessment:     1. Rheumatoid arthritis involving multiple sites with positive rheumatoid factor    2. Ankylosing spondylitis of multiple sites in spine         A's of Opioid Risk Assessment  Activity:Patient can perform ADL.   Analgesia:Patients pain is partially controlled by current medication. Patient has tried OTC medications such as Tylenol and Ibuprofen with out relief.   Adverse Effects: Patient denies constipation or sedation.  Aberrant Behavior:  reviewed with no aberrant drug seeking/taking behavior.  Overdose reversal drug naloxone discussed    Drug screen reviewed      Plan:    Narcan January 2023    Follows rheumatology [facility] rheumatoid arthritis, ankylosing spondylitis     Cardiac bypass surgery [facility] postponed due to small-vessel disease, limited options for bypass     Anticoagulated after myocardial infarction    Cardiology has informed her she can not have:  Scope/procedures until further notice due to poor cardiac function    Currently dealing with kidney stones     She states she would like to continue  conservative management for now    Continue current medication    Continue home exercise program as directed     Follow-up 2 months      Dr. Venegas, November 2023     Bring original prescription medication bottles/container/box with labels to each visit

## 2023-10-12 ENCOUNTER — OFFICE VISIT (OUTPATIENT)
Dept: PAIN MEDICINE | Facility: CLINIC | Age: 43
End: 2023-10-12
Payer: MEDICARE

## 2023-10-12 VITALS
BODY MASS INDEX: 30 KG/M2 | HEART RATE: 79 BPM | WEIGHT: 163 LBS | DIASTOLIC BLOOD PRESSURE: 66 MMHG | RESPIRATION RATE: 18 BRPM | SYSTOLIC BLOOD PRESSURE: 91 MMHG | HEIGHT: 62 IN

## 2023-10-12 DIAGNOSIS — M05.79 RHEUMATOID ARTHRITIS INVOLVING MULTIPLE SITES WITH POSITIVE RHEUMATOID FACTOR: Primary | Chronic | ICD-10-CM

## 2023-10-12 DIAGNOSIS — M45.0 ANKYLOSING SPONDYLITIS OF MULTIPLE SITES IN SPINE: Chronic | ICD-10-CM

## 2023-10-12 PROCEDURE — 99215 OFFICE O/P EST HI 40 MIN: CPT | Mod: PBBFAC | Performed by: PHYSICIAN ASSISTANT

## 2023-10-12 PROCEDURE — 99214 PR OFFICE/OUTPT VISIT, EST, LEVL IV, 30-39 MIN: ICD-10-PCS | Mod: S$PBB,,, | Performed by: PHYSICIAN ASSISTANT

## 2023-10-12 PROCEDURE — 99214 OFFICE O/P EST MOD 30 MIN: CPT | Mod: S$PBB,,, | Performed by: PHYSICIAN ASSISTANT

## 2023-10-12 RX ORDER — GABAPENTIN 300 MG/1
300 CAPSULE ORAL EVERY 8 HOURS
Qty: 90 CAPSULE | Refills: 1 | Status: SHIPPED | OUTPATIENT
Start: 2023-10-12 | End: 2023-11-30 | Stop reason: SDUPTHER

## 2023-10-12 RX ORDER — HYDROCODONE BITARTRATE AND ACETAMINOPHEN 10; 325 MG/1; MG/1
1 TABLET ORAL EVERY 8 HOURS PRN
Qty: 90 TABLET | Refills: 0 | Status: SHIPPED | OUTPATIENT
Start: 2023-10-12 | End: 2024-01-29 | Stop reason: SDUPTHER

## 2023-10-12 RX ORDER — HYDROCODONE BITARTRATE AND ACETAMINOPHEN 10; 325 MG/1; MG/1
1 TABLET ORAL EVERY 8 HOURS PRN
Qty: 90 TABLET | Refills: 0 | Status: SHIPPED | OUTPATIENT
Start: 2023-11-11

## 2023-10-17 RX ORDER — SULFAMETHOXAZOLE AND TRIMETHOPRIM 800; 160 MG/1; MG/1
TABLET ORAL
COMMUNITY
Start: 2023-10-10 | End: 2023-11-10

## 2023-10-17 RX ORDER — RIZATRIPTAN BENZOATE 10 MG/1
10 TABLET ORAL DAILY PRN
COMMUNITY
End: 2023-11-10

## 2023-10-17 RX ORDER — OXYCODONE AND ACETAMINOPHEN 7.5; 325 MG/1; MG/1
1 TABLET ORAL EVERY 6 HOURS PRN
COMMUNITY
End: 2023-11-10

## 2023-10-19 ENCOUNTER — OFFICE VISIT (OUTPATIENT)
Dept: RHEUMATOLOGY | Facility: CLINIC | Age: 43
End: 2023-10-19
Payer: MEDICARE

## 2023-10-19 VITALS
DIASTOLIC BLOOD PRESSURE: 62 MMHG | HEIGHT: 62 IN | BODY MASS INDEX: 30 KG/M2 | SYSTOLIC BLOOD PRESSURE: 94 MMHG | OXYGEN SATURATION: 98 % | RESPIRATION RATE: 16 BRPM | HEART RATE: 77 BPM | WEIGHT: 163 LBS

## 2023-10-19 DIAGNOSIS — M05.79 RHEUMATOID ARTHRITIS INVOLVING MULTIPLE SITES WITH POSITIVE RHEUMATOID FACTOR: Chronic | ICD-10-CM

## 2023-10-19 DIAGNOSIS — M45.0 ANKYLOSING SPONDYLITIS OF MULTIPLE SITES IN SPINE: ICD-10-CM

## 2023-10-19 DIAGNOSIS — F17.219 CIGARETTE NICOTINE DEPENDENCE WITH NICOTINE-INDUCED DISORDER: ICD-10-CM

## 2023-10-19 PROCEDURE — 99406 BEHAV CHNG SMOKING 3-10 MIN: CPT | Mod: S$PBB,,, | Performed by: NURSE PRACTITIONER

## 2023-10-19 PROCEDURE — 99215 OFFICE O/P EST HI 40 MIN: CPT | Mod: S$PBB,25,, | Performed by: NURSE PRACTITIONER

## 2023-10-19 PROCEDURE — 99215 PR OFFICE/OUTPT VISIT, EST, LEVL V, 40-54 MIN: ICD-10-PCS | Mod: S$PBB,25,, | Performed by: NURSE PRACTITIONER

## 2023-10-19 PROCEDURE — 99406 PR TOBACCO USE CESSATION INTERMEDIATE 3-10 MINUTES: ICD-10-PCS | Mod: S$PBB,,, | Performed by: NURSE PRACTITIONER

## 2023-10-19 PROCEDURE — 99213 OFFICE O/P EST LOW 20 MIN: CPT | Mod: PBBFAC | Performed by: NURSE PRACTITIONER

## 2023-10-19 RX ORDER — METHOTREXATE 2.5 MG/1
TABLET ORAL
Qty: 72 TABLET | Refills: 1 | Status: SHIPPED | OUTPATIENT
Start: 2023-10-19 | End: 2023-10-19

## 2023-10-19 RX ORDER — METHOTREXATE 2.5 MG/1
TABLET ORAL
Qty: 72 TABLET | Refills: 1 | Status: SHIPPED | OUTPATIENT
Start: 2023-10-19 | End: 2024-02-01 | Stop reason: SDUPTHER

## 2023-10-19 NOTE — PROGRESS NOTES
RHEUMATOLOGY OUTPATIENT CLINIC NOTE    Subjective:       Patient ID: Cherry Smiley is a 43 y.o. female.    Chief Complaint: Follow-up (Follow up on RA. Patient has not been seen since February and was needing refill of her MTX. States that it has not been working the best for her and she would like to discuss switching. )       History of Present Illness: 44 y/o female presents to the clinic today to follow up on MTX use. Has not been seen since February. States that since the last time she was seen here she was sent to [facility] for possible CABG but was told that she would not survive the surgery. Has had 5 stents placed though. Is still having intermittent bleeding with bowel movements; however, cannot stop blood thinners per Cardiology to have colonoscopy. States that the MTX is no longer helping with her joint pains at all and is endorsing moderate pain in hands, arms, legs, hips, and back. No synovitis noted on exam today. Is currently still smoking cigarettes. History of seizures and is taking Keppra.     Past Medical History:   Diagnosis Date    Allergy     Anxiety     Congenital hypothyroidism     Depressive disorder     GERD (gastroesophageal reflux disease)     Hyperlipidemia     Hypertension     Insomnia     Irritable bowel syndrome     Lumbosacral spondylosis     NSTEMI (non-ST elevated myocardial infarction) 05/09/2022    Primary fibromyalgia syndrome     Seizures     Sleep apnea     Type 2 diabetes mellitus      Social History     Tobacco Use    Smoking status: Former     Current packs/day: 1.00     Average packs/day: 1 pack/day for 35.0 years (35.0 ttl pk-yrs)     Types: Cigarettes    Smokeless tobacco: Never   Substance Use Topics    Alcohol use: Not Currently     Review of patient's allergies indicates:   Allergen Reactions    Cinnamon analogues Anaphylaxis    Iodine Anaphylaxis    Morpholine analogues Shortness Of Breath    Shellfish containing products Anaphylaxis    Adhesive Other (See  "Comments)     Heart monitor gel on leads caused chemical burn     Morphine Other (See Comments)    Pamelor [nortriptyline]        Objective:   BP 94/62 (BP Location: Left arm, Patient Position: Sitting, BP Method: Large (Manual))   Pulse 77   Resp 16   Ht 5' 2" (1.575 m)   Wt 73.9 kg (163 lb)   SpO2 98%   BMI 29.81 kg/m²       There is no immunization history on file for this patient.    Current Outpatient Medications   Medication Instructions    aspirin (ECOTRIN) 81 mg, Oral, Daily    atorvastatin (LIPITOR) 80 MG tablet Take 1 tablet by mouth in the evening    BD INSULIN SYRINGE ULTRA-FINE 0.5 mL 30 gauge x 1/2" Syrg USE SYRINGE THREE TIMES DAILY AS DIRECTED    dapagliflozin-metformin (XIGDUO XR) 5-500 mg TBph Oral    fenofibrate (TRICOR) 145 mg, Oral, Daily    folic acid (FOLVITE) 1 mg, Oral, Daily    gabapentin (NEURONTIN) 300 mg, Oral, Every 8 hours    HYDROcodone-acetaminophen (NORCO)  mg per tablet 1 tablet, Oral, Every 8 hours PRN    [START ON 11/11/2023] HYDROcodone-acetaminophen (NORCO)  mg per tablet 1 tablet, Oral, Every 8 hours PRN    insulin NPH-insulin regular, 70/30, (NOVOLIN 70/30) 100 unit/mL (70-30) injection 22 Units, Subcutaneous, 2 times daily before meals    isosorbide mononitrate (IMDUR) 30 MG 24 hr tablet Take 1 tablet by mouth once daily    LEVEMIR FLEXTOUCH U-100 INSULN 100 unit/mL (3 mL) InPn pen INJECT 40 UNITS SUBCUTANEOUSLY ONCE DAILY    levETIRAcetam (KEPPRA) 750 mg, Oral, 2 times daily    lisinopriL (PRINIVIL,ZESTRIL) 5 mg, Oral    magnesium oxide (MAG-OX) 400 mg (241.3 mg magnesium) tablet 1 tablet, Oral, Nightly    methotrexate 2.5 MG Tab TAKE 6 TABLETS BY MOUTH ONCE A WEEK    metoprolol succinate (TOPROL-XL) 200 MG 24 hr tablet Take 1 tablet by mouth once daily    mupirocin (BACTROBAN) 2 % ointment No dose, route, or frequency recorded.    naloxone (NARCAN) 4 mg/actuation Spry No dose, route, or frequency recorded.    nitroGLYCERIN (NITROSTAT) 0.4 mg, Sublingual, " "Every 5 min PRN, DO NOT EXCEED A TOTAL OF 3 DOSES IN 15 MINUTES    ONETOUCH ULTRA TEST Strp 1 strip, 3 times daily, To check blood sugar    oxyCODONE-acetaminophen (PERCOCET) 7.5-325 mg per tablet 1 tablet, Oral, Every 6 hours PRN    pantoprazole (PROTONIX) 40 mg, Oral, Every morning    pen needle, diabetic 32 gauge x 5/32" Ndle USE AS DIRECTED WITH LEVEMIR PEN    prochlorperazine (COMPAZINE) 10 MG tablet TAKE 1 TABLET BY MOUTH EVERY 6 HOURS AS NEEDED FOR MIGRAINE HEADACHE    ranolazine (RANEXA) 1,000 mg Tb12 No dose, route, or frequency recorded.    rizatriptan (MAXALT) 10 mg, Oral, Daily PRN    sulfamethoxazole-trimethoprim 800-160mg (BACTRIM DS) 800-160 mg Tab No dose, route, or frequency recorded.    ticagrelor (BRILINTA) 90 mg, Oral, 2 times daily    topiramate (TOPAMAX) 25 MG tablet No dose, route, or frequency recorded.    ubrogepant (UBRELVY) 100 mg tablet Take at onset of migraine, may repeat in 2 hours, max 200 mg in 24 hours        BIOLOGIC LABS  Lab Results   Component Value Date    HEPCAB Reactive (A) 04/07/2022          RHEUM LABS  Lab Results   Component Value Date    CRP 0.64 10/19/2023     Assessment:     Review of Systems   Constitutional:  Negative for fever.   Eyes:  Negative for redness.   Respiratory:  Negative for cough.    Gastrointestinal:  Positive for constipation and diarrhea.   Musculoskeletal:  Positive for back pain and joint pain. Negative for falls.   Skin:  Negative for rash.   Neurological:  Positive for headaches.   Endo/Heme/Allergies:  Bruises/bleeds easily.        Physical Exam   Constitutional: No distress.   Cardiovascular: Normal rate and regular rhythm.   Pulmonary/Chest: Effort normal and breath sounds normal.   Musculoskeletal:         General: Normal range of motion.      Cervical back: No rigidity.   Neurological: She is alert.   Skin: Skin is warm.   Psychiatric: Her behavior is normal. Mood normal.   Vitals reviewed.       1. Ankylosing spondylitis of multiple sites " in spine    2. Rheumatoid arthritis involving multiple sites with positive rheumatoid factor    3. Cigarette nicotine dependence with nicotine-induced disorder        I have reviewed the following:     Details / Date    [x]   Labs No labs drawn prior to visit    []   Micro     []   Pathology     []   Imaging     []   Cardiology Procedures     [x]   Other Chart Review     Answers submitted by the patient for this visit:  Rheumatology Questionnaire (Submitted on 10/16/2023)  mouth sores: No  trouble swallowing: No  unexpected weight change: Yes  genital sore: No    Plan:     CBC and CMP labs today  Continue MTX with folic acid  Follow up virtually with Dr. Trinity Dewey to discuss medication changes.       Smoking Cessation counseling  We discussed the importance, over both the short and long-term, of smoking cessation; reviewed the patient's willingness to quit; overall history and current use of tobacco smoking products; that there are a variety of methods to help with smoking diminution and cessation (stopping alone, using nicotine substitution medications/gums, Chantix, other medications, etcetera, which the patient will also discuss with his or her primary care physician); that the patient should set a date for smoking cessation; and the patient will follow up with his or her primary care physician for further support and oversight.     43 minutes of total time spent on the encounter, which includes 3 minutes of smoking cessation counseling, face to face time and non-face to face time preparing to see the patient (eg, review of tests), Obtaining and/or reviewing separately obtained history, Documenting clinical information in the electronic or other health record, Independently interpreting results (not separately reported) and communicating results to the patient/family/caregiver, or Care coordination (not separately reported).            Carline Hsieh, SANDI  [facility] - RHEUMATOLOGY  [address] [facility] [place] [address]  [phone]

## 2023-10-23 PROBLEM — F17.219 CIGARETTE NICOTINE DEPENDENCE WITH NICOTINE-INDUCED DISORDER: Status: ACTIVE | Noted: 2023-10-23

## 2023-11-01 ENCOUNTER — PATIENT MESSAGE (OUTPATIENT)
Dept: RHEUMATOLOGY | Facility: CLINIC | Age: 43
End: 2023-11-01
Payer: MEDICARE

## 2023-11-01 ENCOUNTER — OFFICE VISIT (OUTPATIENT)
Dept: RHEUMATOLOGY | Facility: CLINIC | Age: 43
End: 2023-11-01
Payer: MEDICARE

## 2023-11-01 DIAGNOSIS — M05.79 RHEUMATOID ARTHRITIS INVOLVING MULTIPLE SITES WITH POSITIVE RHEUMATOID FACTOR: Primary | ICD-10-CM

## 2023-11-01 PROCEDURE — 99215 PR OFFICE/OUTPT VISIT, EST, LEVL V, 40-54 MIN: ICD-10-PCS | Mod: GT,,, | Performed by: INTERNAL MEDICINE

## 2023-11-01 PROCEDURE — 99215 OFFICE O/P EST HI 40 MIN: CPT | Mod: GT,,, | Performed by: INTERNAL MEDICINE

## 2023-11-01 RX ORDER — ADALIMUMAB 40MG/0.8ML
40 KIT SUBCUTANEOUS
Qty: 2 PEN | Refills: 11 | Status: SHIPPED | OUTPATIENT
Start: 2023-11-01 | End: 2023-11-07 | Stop reason: CLARIF

## 2023-11-01 RX ORDER — ADALIMUMAB-BWWD 40 MG/.4ML
40 SOLUTION SUBCUTANEOUS
Qty: 2 ML | Refills: 2 | Status: ACTIVE | OUTPATIENT
Start: 2023-11-01 | End: 2023-11-09 | Stop reason: ALTCHOICE

## 2023-11-01 NOTE — PROGRESS NOTES
Trinity Dewey MD   [facility] RHEUMATOLOGY  [address] [address]  [phone]      PATIENT NAME: Cherry Smiley  : 1980  DATE: 23  MRN: [id number]      Billing Provider: Trinity Dewey MD  Level of Service:   Patient PCP Information       Provider PCP Type    King Griffin MD General            The patient location is: home  The chief complaint leading to consultation is:abnormal lab review    Visit type: audio-visual    Face to Face time with patient: 30   30  minutes of total time spent on the encounter, which includes face to face time and non-face to face time preparing to see the patient (eg, review of tests), Obtaining and/or reviewing separately obtained history, Documenting clinical information in the electronic or other health record, Independently interpreting results (not separately reported) and communicating results to the patient/family/caregiver, or Care coordination (not separately reported).         Each patient to whom he or she provides medical services by telemedicine is:  (1) informed of the relationship between the physician and patient and the respective role of any other health care provider with respect to management of the patient; and (2) notified that he or she may decline to receive medical services by telemedicine and may withdraw from such care at any time.    Notes:       Reason for Visit / Chief Complaint: No chief complaint on file.           Assessment and Plan (including Health Maintenance)      Problem List  Smart Sets  Document Outside HM   :    Plan:     There are no diagnoses linked to this encounter. Patient states she is tolerating mtx.   However, today presented with chest pain which in intermittent and started 3 days ago. Patient relates severe stress at home related to daughter's pregnancy.   CP is in setting of /110 with radiation to left shoulder and numbness down left arm.   Patient is advised  to immediately go to ED. May need cardiac evaluation.   Should continue methotrexate upon discharge and set up rheumatology followup after cardiac workup.   Patient is also Hep C antibody positive and viral RNA titers are unknown.     07/20/2022;  S/p CABG since last visit on Brilinta and Metoprolol.  Would continue methotrexate 6 tab/ week.   Reminder to take folic acid    Feb 1 2023:  Has had multiple cardiac catheterizations over the last year.   C/o GI issues now. Not clear if related to mtx because she also has blood in her stool.   She is hepatitis C ++   Needs asap GI referral Dr Ezequiel Miles.     Nov 1 2023;  Changes to meds; lisinopril held due to syncope.   States she has global joint pain and swelling.   Can hardly bend her knees and hands.   Is compliant with taking methotrexate which she takes every Wednesday morning.   Will prescribe adalimumab biosimilar [ hadlima or any other]. Prior auth through [facility].  Will need Hep C viral panel ordered by NP at next visit in 3 months.   MD f/up in 6 months.           Total time spent in Assessment, Co-ordination of Care , Review of Care Plan , Goal Setting and Counseling on this initial visit is ~ 60 minutes     There is currently no information documented on the homunculus. Go to the Rheumatology activity and complete the homunculus joint exam.               History of Present Illness / Problem Focused Workflow     Cherry Smiley presents to the clinic with No chief complaint on file.     Dx with RA 3 years ago. Was experiencing swelling in her small joints including hands, wrists and elbows.   Also had bilateral rotator cuff injuries.   Hx of childhood traumatic fractures of the right patella and R foot. All were traumatic.   Patient states the epidural injections to the spine are not helping.   States she is unable to move when she awakens in the morning. Stiffness is severe.    who accompanies her relates that she is not able to get in and out of a car  easily.   Patient is also on Percocet twice daily  No h/o DMARD initiation. States her rheumatologist has her on NSAIDs although she has not followed up since 2019.   Is active smoker - one pack per day   Significant dental cavities.   Does have h/o synovitis or swelling in her knees.   Also has h/o chest pain.       Review of Systems     Review of Systems   Constitutional:  Positive for unexpected weight change. Negative for fever.   HENT:  Negative for mouth sores and trouble swallowing.    Eyes:  Negative for redness.   Respiratory:  Positive for shortness of breath. Negative for cough.    Cardiovascular:  Positive for chest pain.   Gastrointestinal:  Negative for constipation and diarrhea.   Genitourinary:  Negative for dysuria and genital sores.   Skin:  Negative for rash.   Neurological:  Positive for headaches.   Hematological:  Bruises/bleeds easily.       Medical / Social / Family History     Past Medical History:   Diagnosis Date    Allergy     Anxiety     Congenital hypothyroidism     Depressive disorder     GERD (gastroesophageal reflux disease)     Hyperlipidemia     Hypertension     Insomnia     Irritable bowel syndrome     Lumbosacral spondylosis     NSTEMI (non-ST elevated myocardial infarction) 05/09/2022    Primary fibromyalgia syndrome     Seizures     Sleep apnea     Type 2 diabetes mellitus        Past Surgical History:   Procedure Laterality Date    ADENOIDECTOMY      ANGIOGRAM, CORONARY, WITH LEFT HEART CATHETERIZATION N/A 5/9/2022    Procedure: Angiogram, Coronary, with Left Heart Cath;  Surgeon: Arthur Dewey MD;  Location: [facility] CATH LAB;  Service: Cardiology;  Laterality: N/A;    EPIDURAL STEROID INJECTION  09/02/2020    C5-6 FABIANO - Dr Venegas    ESOPHAGOGASTRODUODENOSCOPY      HYSTERECTOMY      INJECTION OF FACET JOINT Left 12/27/2018    Left C6-7 Facet Injection - Deb    INJECTION OF FACET JOINT Right 05/19/2019    Right C4-7 Facet Injection X 2 -Deb    INJECTION OF FACET JOINT  Left 02/27/2019    Left C4-7 Facet Injection -Deb    knee bilateral      LEFT HEART CATHETERIZATION N/A 9/15/2022    Procedure: Left heart cath;  Surgeon: Arthur Dewey MD;  Location: [facility] CATH LAB;  Service: Cardiology;  Laterality: N/A;    RADIOFREQUENCY THERMOCOAGULATION Right 05/21/2019    Right C4-7 RFTC - Deb    RADIOFREQUENCY THERMOCOAGULATION Left 04/23/2019    Left C4-7 RFTC -Deb    shoulder bilateral      TONSILLECTOMY         Social History  Ms. Cherry Smiley  reports that she has quit smoking. Her smoking use included cigarettes. She has a 35.0 pack-year smoking history. She has never used smokeless tobacco. She reports that she does not currently use alcohol. She reports that she does not currently use drugs after having used the following drugs: Marijuana.    Family History  Ms.'liliana Smiley family history includes Cancer in her brother; Diabetes in her brother and mother; Heart disease in her father and mother; Hypertension in her brother, father, and mother; Lupus in her father; Stroke in her father and mother.    Medications and Allergies     Medications  No outpatient medications have been marked as taking for the 11/1/23 encounter (Appointment) with Trinity Dewey MD.       Allergies  Review of patient's allergies indicates:   Allergen Reactions    Cinnamon analogues Anaphylaxis    Iodine Anaphylaxis    Morpholine analogues Shortness Of Breath    Shellfish containing products Anaphylaxis    Adhesive Other (See Comments)     Heart monitor gel on leads caused chemical burn     Morphine Other (See Comments)    Pamelor [nortriptyline]        Physical Examination     There were no vitals filed for this visit.    Physical Exam  Constitutional:       Appearance: She is obese.   HENT:      Head: Normocephalic and atraumatic.      Right Ear: External ear normal.      Left Ear: External ear normal.      Nose: Nose normal. No congestion or rhinorrhea.   Eyes:      Extraocular Movements:  Extraocular movements intact.      Pupils: Pupils are equal, round, and reactive to light.   Cardiovascular:      Pulses: Normal pulses.   Pulmonary:      Effort: Pulmonary effort is normal.      Breath sounds: Normal breath sounds. No wheezing.   Chest:      Chest wall: No tenderness.   Musculoskeletal:         General: Swelling and tenderness present. Normal range of motion.      Cervical back: No rigidity or tenderness.      Comments: Multiple tender and swollen joints MCP ++   Spinal stiffness +    Lymphadenopathy:      Cervical: No cervical adenopathy.   Skin:     Findings: No rash.   Neurological:      General: No focal deficit present.      Mental Status: She is alert and oriented to person, place, and time.   Psychiatric:         Mood and Affect: Mood normal.         Thought Content: Thought content normal.              Signature:  Trinity Dewey MD  [facility] - RHEUMATOLOGY  [address] [place] [id number]  [phone]    Date of encounter: 11/1/23

## 2023-11-07 RX ORDER — ADALIMUMAB 40MG/0.4ML
40 KIT SUBCUTANEOUS
Qty: 2 PEN | Refills: 11 | Status: ACTIVE | OUTPATIENT
Start: 2023-11-07 | End: 2024-11-06

## 2023-12-01 NOTE — PROGRESS NOTES
She Disclaimer: This note has been generated using voice-recognition software. There may be typographical errors that have been missed during proof-reading        Patient ID: Cherry Smiley is a 43 y.o. female.      Chief Complaint: Neck Pain, Low-back Pain, Knee Pain (bilateral), and Shoulder Pain (Bilateral left more)      43-year-old female returns for re-evaluation of chronic pain, osteoarthritis, multiple joint and lower back pain.  She is not a candidate for nerve block injections due to chronic anticoagulation and multiple comorbidities including diabetes and seizure disorder. She has coronary artery disease and is s/p numerous MIs and 6 stent placements.  She is a patient at [facility].  She received methotexrate, folic acid and  Humira 1 month ago by Dr. Dewey.   She returns today for medication refill. Her cervical pain is the most bothersome but unfortunately we are unable to receive clearance to hold anticoagulants from her cardiologist to perform neural axial procedures.           Pain Assessment  Pain Assessment: 0-10  Pain Score:   4  Pain Location: Neck  Pain Radiating Towards: neck, lower back, bilateral knees, bilateral shoulders- left more  Pain Descriptors: Aching  Pain Frequency: Constant/continuous  Pain Onset: Awakened from sleep  Clinical Progression: Gradually worsening  Aggravating Factors: Other (Comment), Walking (lying, sitting)  Pain Intervention(s): Medication (See eMAR), Home medication, Heat applied, Cold applied      A's of Opioid Risk Assessment  Activity:Patient can perform ADL.   Analgesia:Patients pain is  controlled by current medication.   Adverse Effects: Patient denies constipation or sedation.  Aberrant Behavior:  reviewed with no aberrant drug seeking/taking behavior.      Patient denies any suicidal or homicidal ideations    Physical Therapy/Home Exercise: yes          Review of Systems   Constitutional: Negative.    HENT: Negative.     Eyes:  Negative.    Respiratory: Negative.     Cardiovascular:  Positive for chest pain.   Gastrointestinal: Negative.    Endocrine: Negative.    Genitourinary: Negative.    Musculoskeletal:  Positive for arthralgias (Right hip, knee and shoulder), back pain and gait problem.   Integumentary:  Negative.   Hematological: Negative.    Psychiatric/Behavioral: Negative.               Past Medical History:   Diagnosis Date    Allergy     Anxiety     Congenital hypothyroidism     Depressive disorder     GERD (gastroesophageal reflux disease)     Hyperlipidemia     Hypertension     Insomnia     Irritable bowel syndrome     Lumbosacral spondylosis     NSTEMI (non-ST elevated myocardial infarction) 05/09/2022    Primary fibromyalgia syndrome     Seizures     Sleep apnea     Type 2 diabetes mellitus      Past Surgical History:   Procedure Laterality Date    ADENOIDECTOMY      ANGIOGRAM, CORONARY, WITH LEFT HEART CATHETERIZATION N/A 5/9/2022    Procedure: Angiogram, Coronary, with Left Heart Cath;  Surgeon: Arthur Dewey MD;  Location: [facility] CATH LAB;  Service: Cardiology;  Laterality: N/A;    EPIDURAL STEROID INJECTION  09/02/2020    C5-6 FABIANO - Dr Venegas    ESOPHAGOGASTRODUODENOSCOPY      HYSTERECTOMY      INJECTION OF FACET JOINT Left 12/27/2018    Left C6-7 Facet Injection - Deb    INJECTION OF FACET JOINT Right 05/19/2019    Right C4-7 Facet Injection X 2 -Deb    INJECTION OF FACET JOINT Left 02/27/2019    Left C4-7 Facet Injection -Deb    knee bilateral      LEFT HEART CATHETERIZATION N/A 9/15/2022    Procedure: Left heart cath;  Surgeon: Arthur Dewey MD;  Location: [facility] CATH LAB;  Service: Cardiology;  Laterality: N/A;    RADIOFREQUENCY THERMOCOAGULATION Right 05/21/2019    Right C4-7 RFTC - Deb    RADIOFREQUENCY THERMOCOAGULATION Left 04/23/2019    Left C4-7 RFTC -Deb    shoulder bilateral      TONSILLECTOMY       Social History     Socioeconomic History    Marital status:    Tobacco Use    Smoking  status: Former     Current packs/day: 1.00     Average packs/day: 1 pack/day for 35.0 years (35.0 ttl pk-yrs)     Types: Cigarettes    Smokeless tobacco: Never   Substance and Sexual Activity    Alcohol use: Not Currently    Drug use: Not Currently     Types: Marijuana     Family History   Problem Relation Age of Onset    Stroke Mother     Diabetes Mother     Heart disease Mother     Hypertension Mother     Hypertension Father     Heart disease Father     Stroke Father     Lupus Father     Cancer Brother     Diabetes Brother     Hypertension Brother      Review of patient's allergies indicates:   Allergen Reactions    Cinnamon analogues Anaphylaxis    Iodine Anaphylaxis    Morpholine analogues Shortness Of Breath    Shellfish containing products Anaphylaxis    Adhesive Other (See Comments)     Heart monitor gel on leads caused chemical burn     Morphine Other (See Comments)    Pamelor [nortriptyline]      has a current medication list which includes the following prescription(s): humira(cf) pen, aspirin, atorvastatin, bd insulin syringe ultra-fine, dapaglifloz propaned-metformin, fenofibrate, folic acid, hydrocodone-acetaminophen, hydrocodone-acetaminophen, insulin nph-insulin regular (70/30), isosorbide mononitrate, levemir flextouch u100 insulin, levetiracetam, magnesium oxide, methotrexate, metoprolol succinate, nitroglycerin, onetouch ultra test, pen needle, diabetic, ranolazine, ticagrelor, topiramate, ubrogepant, gabapentin, [START ON 12/12/2023] hydrocodone-acetaminophen, [START ON 1/11/2024] hydrocodone-acetaminophen, naloxone, pantoprazole, and prochlorperazine.      Objective:  Vitals:    12/04/23 1320   BP: (!) 85/60   Pulse: 70   Resp: 18        Physical Exam  Vitals and nursing note reviewed.   Constitutional:       General: She is not in acute distress.     Appearance: Normal appearance. She is not ill-appearing, toxic-appearing or diaphoretic.   HENT:      Head: Normocephalic and atraumatic.       Nose: Nose normal.      Mouth/Throat:      Mouth: Mucous membranes are moist.   Eyes:      Extraocular Movements: Extraocular movements intact.      Pupils: Pupils are equal, round, and reactive to light.   Cardiovascular:      Rate and Rhythm: Normal rate and regular rhythm.      Heart sounds: Normal heart sounds.   Pulmonary:      Effort: Pulmonary effort is normal. No respiratory distress.      Breath sounds: Normal breath sounds. No stridor. No wheezing or rhonchi.   Abdominal:      General: Bowel sounds are normal.      Palpations: Abdomen is soft.   Musculoskeletal:         General: No swelling or deformity.      Right shoulder: Tenderness and bony tenderness present.      Cervical back: Normal and normal range of motion. No spasms or tenderness. No pain with movement. Normal range of motion.      Thoracic back: Normal.      Lumbar back: Tenderness and bony tenderness present. No spasms. Normal range of motion. Negative right straight leg raise test and negative left straight leg raise test. No scoliosis.      Right hip: Tenderness and bony tenderness present. Decreased range of motion.      Right lower leg: No edema.      Left lower leg: No edema.   Skin:     General: Skin is warm.   Neurological:      General: No focal deficit present.      Mental Status: She is alert and oriented to person, place, and time. Mental status is at baseline.      Cranial Nerves: No cranial nerve deficit.      Sensory: Sensation is intact. No sensory deficit.      Motor: No weakness.      Coordination: Coordination normal.      Gait: Gait normal.      Deep Tendon Reflexes: Reflexes are normal and symmetric.   Psychiatric:         Mood and Affect: Mood normal.         Behavior: Behavior normal.           Assessment:      1. Encounter for long-term (current) use of other medications    2. Ankylosing spondylitis of multiple sites in spine    3. Rheumatoid arthritis involving multiple sites with positive rheumatoid factor           Plan:  1. reviewed  2.Addiction, Dependency, Tolerance, Opioid abuse-misuse, Death, Diversion Discussed. Overdose reversal drug Naloxone discussed.Patient is prescribed opiates for chronic nonmalignant pain pathology.  Patient is receiving opiates which require greater than a 72 hour supply of therapy.  Patient was educated on potential dependency associated with long-term opioid use as well as decreasing efficacy with prolonged use.  Patient was advised of risks, benefits and side effects and how to utilize each medication.  Patient was also informed that any deviation from therapy protocol will  lead to discontinuation of opiates.  It is reasonable to prescribe opioid analgesics for patient based on positive response to opioid medications, lack of side effects and  limited aberrant behavior.    3.Refill/Continue medications for pain control and function       Requested Prescriptions     Signed Prescriptions Disp Refills    HYDROcodone-acetaminophen (NORCO)  mg per tablet 90 tablet 0     Sig: Take 1 tablet by mouth every 8 (eight) hours as needed for Pain.    HYDROcodone-acetaminophen (NORCO)  mg per tablet 90 tablet 0     Sig: Take 1 tablet by mouth every 8 (eight) hours as needed for Pain.    gabapentin (NEURONTIN) 300 MG capsule 90 capsule 1     Sig: Take 1 capsule (300 mg total) by mouth every 8 (eight) hours.     4.Patient defers nerve block injections, physical therapy or surgical consultation       5.Follow with CHRISTY Jackson in 2 months for re-evaluation and medication refill       report:  Reviewed and consistent with medication use as prescribed.      The total time spent for evaluation and management on 12/05/2023 including reviewing separately obtained history, performing a medically appropriate exam and evaluation, documenting clinical information in the health record, independently interpreting results and communicating them to the patient/family/caregiver, and ordering  medications/tests/procedures was between 15-29 minutes.    The above plan and management options were discussed at length with patient. Patient is in agreement with the above and verbalized understanding. It will be communicated with the referring physician via electronic record, fax, or mail.

## 2023-12-04 ENCOUNTER — PATIENT MESSAGE (OUTPATIENT)
Dept: ADMINISTRATIVE | Facility: OTHER | Age: 43
End: 2023-12-04

## 2023-12-04 ENCOUNTER — OFFICE VISIT (OUTPATIENT)
Dept: PAIN MEDICINE | Facility: CLINIC | Age: 43
End: 2023-12-04
Payer: MEDICARE

## 2023-12-04 VITALS
HEART RATE: 70 BPM | WEIGHT: 162 LBS | RESPIRATION RATE: 18 BRPM | SYSTOLIC BLOOD PRESSURE: 85 MMHG | DIASTOLIC BLOOD PRESSURE: 60 MMHG | HEIGHT: 62 IN | BODY MASS INDEX: 29.81 KG/M2

## 2023-12-04 DIAGNOSIS — M45.0 ANKYLOSING SPONDYLITIS OF MULTIPLE SITES IN SPINE: Chronic | ICD-10-CM

## 2023-12-04 DIAGNOSIS — M05.79 RHEUMATOID ARTHRITIS INVOLVING MULTIPLE SITES WITH POSITIVE RHEUMATOID FACTOR: Chronic | ICD-10-CM

## 2023-12-04 DIAGNOSIS — Z79.899 ENCOUNTER FOR LONG-TERM (CURRENT) USE OF OTHER MEDICATIONS: Primary | ICD-10-CM

## 2023-12-04 LAB
CTP QC/QA: YES
POC (AMP) AMPHETAMINE: NEGATIVE
POC (BAR) BARBITURATES: NEGATIVE
POC (BUP) BUPRENORPHINE: NEGATIVE
POC (BZO) BENZODIAZEPINES: NEGATIVE
POC (COC) COCAINE: NEGATIVE
POC (MDMA) METHYLENEDIOXYMETHAMPHETAMINE 3,4: NEGATIVE
POC (MET) METHAMPHETAMINE: NEGATIVE
POC (MOP) OPIATES: ABNORMAL
POC (MTD) METHADONE: NEGATIVE
POC (OXY) OXYCODONE: NEGATIVE
POC (PCP) PHENCYCLIDINE: NEGATIVE
POC (TCA) TRICYCLIC ANTIDEPRESSANTS: NEGATIVE
POC TEMPERATURE (URINE): 92
POC THC: NEGATIVE

## 2023-12-04 PROCEDURE — 99215 OFFICE O/P EST HI 40 MIN: CPT | Mod: PBBFAC | Performed by: PAIN MEDICINE

## 2023-12-04 PROCEDURE — 99999PBSHW POCT URINE DRUG SCREEN PRESUMP: ICD-10-PCS | Mod: PBBFAC,,,

## 2023-12-04 PROCEDURE — 80305 DRUG TEST PRSMV DIR OPT OBS: CPT | Mod: PBBFAC | Performed by: PAIN MEDICINE

## 2023-12-04 PROCEDURE — 99999PBSHW POCT URINE DRUG SCREEN PRESUMP: Mod: PBBFAC,,,

## 2023-12-04 PROCEDURE — 99214 PR OFFICE/OUTPT VISIT, EST, LEVL IV, 30-39 MIN: ICD-10-PCS | Mod: S$PBB,,, | Performed by: PAIN MEDICINE

## 2023-12-04 PROCEDURE — 99214 OFFICE O/P EST MOD 30 MIN: CPT | Mod: S$PBB,,, | Performed by: PAIN MEDICINE

## 2023-12-04 RX ORDER — HYDROCODONE BITARTRATE AND ACETAMINOPHEN 10; 325 MG/1; MG/1
1 TABLET ORAL EVERY 8 HOURS PRN
Qty: 90 TABLET | Refills: 0 | Status: SHIPPED | OUTPATIENT
Start: 2024-01-11 | End: 2024-02-10

## 2023-12-04 RX ORDER — HYDROCODONE BITARTRATE AND ACETAMINOPHEN 10; 325 MG/1; MG/1
1 TABLET ORAL EVERY 8 HOURS PRN
Qty: 90 TABLET | Refills: 0 | Status: SHIPPED | OUTPATIENT
Start: 2023-12-12 | End: 2024-01-11

## 2023-12-04 RX ORDER — GABAPENTIN 300 MG/1
300 CAPSULE ORAL EVERY 8 HOURS
Qty: 90 CAPSULE | Refills: 1 | Status: SHIPPED | OUTPATIENT
Start: 2023-12-04 | End: 2024-01-29 | Stop reason: SDUPTHER

## 2024-01-08 RX ORDER — METOPROLOL SUCCINATE 200 MG/1
TABLET, EXTENDED RELEASE ORAL
Qty: 90 TABLET | Refills: 0 | Status: SHIPPED | OUTPATIENT
Start: 2024-01-08

## 2024-01-29 NOTE — PROGRESS NOTES
Subjective:         Patient ID: Cherry Smiley is a 43 y.o. female.    Chief Complaint: Joint Pain        Pain  This is a chronic problem. The current episode started more than 1 year ago. The problem occurs daily. The problem has been unchanged. Associated symptoms include arthralgias and neck pain. Pertinent negatives include no anorexia, chest pain, chills, coughing, diaphoresis, fever, sore throat, vertigo or vomiting.     Review of Systems   Constitutional:  Negative for activity change, appetite change, chills, diaphoresis, fever and unexpected weight change.   HENT:  Negative for drooling, ear discharge, facial swelling, nosebleeds, sore throat, trouble swallowing, voice change and goiter.    Eyes:  Negative for photophobia, pain, discharge, redness and visual disturbance.   Respiratory:  Negative for apnea, cough, choking, chest tightness, shortness of breath, wheezing and stridor.    Cardiovascular:  Negative for chest pain, palpitations and leg swelling.   Gastrointestinal:  Negative for abdominal distention, anorexia, diarrhea, rectal pain, vomiting and fecal incontinence.   Endocrine: Negative for cold intolerance, heat intolerance, polydipsia, polyphagia and polyuria.   Genitourinary:  Negative for flank pain, frequency and hot flashes.   Musculoskeletal:  Positive for arthralgias, back pain, neck pain and neck stiffness.   Integumentary:  Negative for color change and pallor.   Allergic/Immunologic: Negative for immunocompromised state.   Neurological:  Negative for dizziness, vertigo, seizures, syncope, facial asymmetry, speech difficulty, light-headedness, memory loss and coordination difficulties.   Hematological:  Negative for adenopathy. Does not bruise/bleed easily.   Psychiatric/Behavioral:  Negative for agitation, behavioral problems, confusion, decreased concentration, dysphoric mood, hallucinations, self-injury and suicidal ideas. The patient is not nervous/anxious and is not hyperactive.             Past Medical History:   Diagnosis Date    Allergy     Anxiety     Congenital hypothyroidism     Depressive disorder     GERD (gastroesophageal reflux disease)     Hyperlipidemia     Hypertension     Insomnia     Irritable bowel syndrome     Lumbosacral spondylosis     NSTEMI (non-ST elevated myocardial infarction) 05/09/2022    Primary fibromyalgia syndrome     Seizures     Sleep apnea     Type 2 diabetes mellitus      Past Surgical History:   Procedure Laterality Date    ADENOIDECTOMY      ANGIOGRAM, CORONARY, WITH LEFT HEART CATHETERIZATION N/A 05/09/2022    Procedure: Angiogram, Coronary, with Left Heart Cath;  Surgeon: Arthur Dewey MD;  Location: [facility] CATH LAB;  Service: Cardiology;  Laterality: N/A;    ELECTROCARDIOGRAPHY      EPIDURAL STEROID INJECTION  09/02/2020    C5-6 FABIANO - Dr Venegas    ESOPHAGOGASTRODUODENOSCOPY      HYSTERECTOMY      INJECTION OF FACET JOINT Left 12/27/2018    Left C6-7 Facet Injection - Deb    INJECTION OF FACET JOINT Right 05/19/2019    Right C4-7 Facet Injection X 2 -Deb    INJECTION OF FACET JOINT Left 02/27/2019    Left C4-7 Facet Injection -Deb    knee bilateral      LEFT HEART CATHETERIZATION N/A 09/15/2022    Procedure: Left heart cath;  Surgeon: Arthur Dewey MD;  Location: [facility] CATH LAB;  Service: Cardiology;  Laterality: N/A;    RADIOFREQUENCY THERMOCOAGULATION Right 05/21/2019    Right C4-7 RFTC - Deb    RADIOFREQUENCY THERMOCOAGULATION Left 04/23/2019    Left C4-7 RFTC -Deb    shoulder bilateral      TONSILLECTOMY       Social History     Socioeconomic History    Marital status:    Tobacco Use    Smoking status: Former     Current packs/day: 1.00     Average packs/day: 1 pack/day for 35.0 years (35.0 ttl pk-yrs)     Types: Cigarettes    Smokeless tobacco: Never   Substance and Sexual Activity    Alcohol use: Not Currently    Drug use: Not Currently     Types: Marijuana     Family History   Problem Relation Age of Onset    Stroke Mother  Date of Birth: [birth year]	Time of Birth: 17:33    Admission Weight (g): 2560    Admission Date and Time:  20 @ 17:33         Gestational Age: 40.3     Source of admission [ X] Inborn     [ X]Transport from well NBN    HPI:Baby girl born at 40.3 wks via CS for cat II tracing to a 30 y/o  O- blood type mother. Maternal history of anxiety (no meds) and migraines (excedrin not during pregnancy). Prenatal history of CAT II FHT  or No significant maternal or prenatal history. PNL nr/immune/-, GBS + , given amp x 6. AROM at 15:32 with clear fluids. Baby emerged vigorous, crying, was w/d/s/s with APGARS of 8/9. Mom would like to breast feed, requests Hep B. EOS 0.05.    Baby girl is SGA, and hypoglycemia protocol was performed. D-sticks were low, baby received dextrose gel x 2.      Social History: No history of alcohol/tobacco exposure obtained  FHx: non-contributory to the condition being treated or details of FH documented here  ROS: unable to obtain ()     PHYSICAL EXAM:    General:	         Awake and active;   Head:		AFOF  Eyes:		Normally set bilaterally  Ears:		Patent bilaterally, no deformities  Nose/Mouth:	Nares patent, palate intact  Neck:		No masses, intact clavicles  Chest/Lungs:      Breath sounds equal to auscultation. No retractions  CV:		No murmurs appreciated, normal pulses bilaterally  Abdomen:          Soft nontender nondistended, no masses, bowel sounds present  :		Normal for gestational age  Back:		Intact skin, no sacral dimples or tags  Anus:		Grossly patent  Extremities:	FROM, no hip clicks  Skin:		Pink, no lesions  Neuro exam:	Appropriate tone, activity    **************************************************************************************************  Age:3d    LOS:3d    Vital Signs:  T(C): 37 ( @ 08:00), Max: 37.1 ( @ 05:00)  HR: 146 ( @ 08:00) (125 - 146)  BP: 62/42 ( @ 08:00) (62/42 - 65/40)  RR: 60 ( @ 08:00) (42 - 60)  SpO2: 96% ( @ 08:00) (96% - 100%)    ampicillin IV Intermittent - NICU 260 milliGRAM(s) every 8 hours  dextrose 12.5% - . 250 milliLiter(s) <Continuous>  gentamicin  IV Intermittent - Peds 13 milliGRAM(s) every 36 hours  hepatitis B IntraMuscular Vaccine - Peds 0.5 milliLiter(s) once      LABS:         Blood type, Baby [] ABO: O  Rh; Positive DC; Negative                              0   0 )-----------( 81             [ @ 03:00]                  0  S 0%  B 0%  [place] 0%  Myelo 0%  Promyelo 0%  Blasts 0%  Lymph 0%  Mono 0%  Eos 0%  Baso 0%  Retic 0%                        20.5   6.66 )-----------( 69             [ @ 06:26]                  64.0  S 55.0%  B 0%  [place] 0%  Myelo 0%  Promyelo 0%  Blasts 0%  Lymph 33.0%  Mono 9.0%  Eos 0.0%  Baso 0%  Retic 0%        134  |98   | 2      ------------------<28   Ca 9.2  Mg 1.9  Ph 6.0   [ @ 02:00]  Test not performed SPECIMEN SEVERELY HEMOLYZED | 20   | 0.40        136  |100  | 2      ------------------<27   Ca 9.4  Mg 1.7  Ph 5.8   [ @ 13:55]  6.1   | 20   | 0.50               Bili T/D  [ @ 02:00] - 11.9/0.6, Bili T/D  [ 13:55] - 12.5/0.5, Bili T/D  [ 02:20] - 12.2/Test not performed SPECIMEN GROSSLY HEMOLYZED          POCT Glucose:    56    [08:07] ,    51    [05:36] ,    42    [04:50] ,    44    [04:49] ,    45    [01:59] ,    47    [22:58] ,    49    [20:00] ,    51    [18:24] ,    42    [16:55] ,    41    [15:47] ,    42    [15:05] ,    45    [13:51] ,    50    [12:10] ,    46    [10:59]             **************************************************************************************************		  DISCHARGE PLANNING (date and status):  Hep B Vacc:  CCHD:			  :					  Hearing:   [place] screen:	  Circumcision:  Hip US rec:  	  Synagis: 			  Other Immunizations (with dates):    		  Neurodevelop eval?	  CPR class done?  	  PVS at DC?  Vit D at DC?	  FE at DC?	    PMD:          Name:  ______________ _             Contact information:  ______________ _  Pharmacy: Name:  ______________ _              Contact information:  ______________ _    Follow-up appointments (list):      Time spent on the total subsequent encounter with >50% of the visit spent on counseling and/or coordination of care:[ _ ] 15 min[ _ ] 25 min[ _ ] 35 min  [ _ ] Discharge time spent >30 min   [ __ ] Car seat oximetry reviewed. "    Diabetes Mother     Heart disease Mother     Hypertension Mother     Hypertension Father     Heart disease Father     Stroke Father     Lupus Father     Cancer Brother     Diabetes Brother     Hypertension Brother      Review of patient's allergies indicates:   Allergen Reactions    Cinnamon analogues Anaphylaxis    Iodine Anaphylaxis    Morpholine analogues Shortness Of Breath    Shellfish containing products Anaphylaxis    Adhesive Other (See Comments)     Heart monitor gel on leads caused chemical burn     Morphine Other (See Comments)    Pamelor [nortriptyline]         Objective:  Vitals:    02/01/24 0822   BP: 95/69   Pulse: 72   Resp: 18   Weight: 70.3 kg (155 lb)   Height: 5' 2" (1.575 m)   PainSc:   8         Physical Exam  Vitals and nursing note reviewed. Exam conducted with a chaperone present.   Constitutional:       General: She is awake. She is not in acute distress.     Appearance: Normal appearance. She is not ill-appearing, toxic-appearing or diaphoretic.   HENT:      Head: Normocephalic and atraumatic.      Nose: Nose normal.      Mouth/Throat:      Mouth: Mucous membranes are moist.      Pharynx: Oropharynx is clear.   Eyes:      Conjunctiva/sclera: Conjunctivae normal.      Pupils: Pupils are equal, round, and reactive to light.   Cardiovascular:      Rate and Rhythm: Normal rate.   Pulmonary:      Effort: Pulmonary effort is normal. No respiratory distress.   Abdominal:      Palpations: Abdomen is soft.   Musculoskeletal:         General: Normal range of motion.      Right shoulder: Tenderness present.      Left shoulder: Tenderness present.      Right wrist: Tenderness present.      Left wrist: Tenderness present.      Cervical back: Normal range of motion and neck supple. Tenderness present.      Thoracic back: Tenderness present.      Lumbar back: Tenderness present.   Skin:     General: Skin is warm and dry.   Neurological:      General: No focal deficit present.      Mental Status: She " is alert and oriented to person, place, and time. Mental status is at baseline.      Cranial Nerves: No cranial nerve deficit (II-XII).   Psychiatric:         Mood and Affect: Mood normal.         Behavior: Behavior normal. Behavior is cooperative.         Thought Content: Thought content normal.           US Abdomen Complete  Narrative: EXAMINATION:  US ABDOMEN COMPLETE    CLINICAL HISTORY:  Abdominal Pain    TECHNIQUE:  Grayscale and color Doppler imaging performed    COMPARISON:  None available    FINDINGS:  The liver is normal in size and echogenicity. The gallbladder is fluid-filled without evidence of stones or sludge. The gallbladder wall thickness is 2.0 mm. The common bile duct measures 2.0 mm.    The visualized portion of the pancreas appear within normal limits    The kidneys are normal in size and echogenicity without hydronephrosis or other abnormality. The right renal length is 11.3 cm. Left renal length is 11.1 cm.    Spleen, aorta and IVC appear within normal limits. No free fluid or free air seen.  Impression: No evidence of abnormality demonstrated.    Ultrasound images stored and captured.    Electronically signed by: Eren James  Date:    02/15/2023  Time:    09:16         Office Visit on 12/04/2023   Component Date Value Ref Range Status    POC Amphetamines 12/04/2023 Negative  Negative, Inconclusive Final    POC Barbiturates 12/04/2023 Negative  Negative, Inconclusive Final    POC Benzodiazepines 12/04/2023 Negative  Negative, Inconclusive Final    POC Cocaine 12/04/2023 Negative  Negative, Inconclusive Final    POC THC 12/04/2023 Negative  Negative, Inconclusive Final    POC Methadone 12/04/2023 Negative  Negative, Inconclusive Final    POC Methamphetamine 12/04/2023 Negative  Negative, Inconclusive Final    POC Opiates 12/04/2023 Presumptive Positive (A)  Negative, Inconclusive Final    POC Oxycodone 12/04/2023 Negative  Negative, Inconclusive Final    POC Phencyclidine 12/04/2023  Negative  Negative, Inconclusive Final    POC Methylenedioxymethamphetamine * 12/04/2023 Negative  Negative, Inconclusive Final    POC Tricyclic Antidepressants 12/04/2023 Negative  Negative, Inconclusive Final    POC Buprenorphine 12/04/2023 Negative   Final     Acceptable 12/04/2023 Yes   Final    POC Temperature (Urine) 12/04/2023 92   Final   Lab Visit on 10/19/2023   Component Date Value Ref Range Status    HCV RNA Detect/Quant 10/19/2023 Undetected  Undetected IU/mL Final    CCP 10/19/2023 <16.0  <=19.9 units Final    RA 10/19/2023 Negative  Negative Final    RA Titer 10/19/2023 <10  0 - 15 IU/mL Final    ESR [place] 10/19/2023 36 (H)  0 - 20 mm/Hr Final    CRP 10/19/2023 0.64  0.00 - 0.80 mg/dL Final    WBC 10/19/2023 11.14 (H)  4.50 - 11.00 K/uL Final    RBC 10/19/2023 3.41 (L)  4.20 - 5.40 M/uL Final    Hemoglobin 10/19/2023 12.3  12.0 - 16.0 g/dL Final    Hematocrit 10/19/2023 36.1 (L)  38.0 - 47.0 % Final    MCV 10/19/2023 105.9 (H)  80.0 - 96.0 fL Final    MCH 10/19/2023 36.1 (H)  27.0 - 31.0 pg Final    MCHC 10/19/2023 34.1  32.0 - 36.0 g/dL Final    RDW 10/19/2023 15.5 (H)  11.5 - 14.5 % Final    Platelet Count 10/19/2023 437 (H)  150 - 400 K/uL Final    MPV 10/19/2023 9.9  9.4 - 12.4 fL Final    Neutrophils % 10/19/2023 62.0  53.0 - 65.0 % Final    Lymphocytes % 10/19/2023 28.5  27.0 - 41.0 % Final    Monocytes % 10/19/2023 5.7  2.0 - 6.0 % Final    Eosinophils % 10/19/2023 2.6  1.0 - 4.0 % Final    Basophils % 10/19/2023 0.6  0.0 - 1.0 % Final    Immature Granulocytes % 10/19/2023 0.6 (H)  0.0 - 0.4 % Final    nRBC, Auto 10/19/2023 0.0  <=0.0 % Final    Neutrophils, Abs 10/19/2023 6.90  1.80 - 7.70 K/uL Final    Lymphocytes, Absolute 10/19/2023 3.17  1.00 - 4.80 K/uL Final    Monocytes, Absolute 10/19/2023 0.64  0.00 - 0.80 K/uL Final    Eosinophils, Absolute 10/19/2023 0.29  0.00 - 0.50 K/uL Final    Basophils, Absolute 10/19/2023 0.07  0.00 - 0.20 K/uL Final    Immature  Granulocytes, Absolute 10/19/2023 0.07 (H)  0.00 - 0.04 K/uL Final    nRBC, Absolute 10/19/2023 0.00  <=0.00 x10e3/uL Final    Diff Type 10/19/2023 Scan Smear   Final    Platelet Morphology 10/19/2023 Few Large Platelets (A)  Normal Final    Anisocytosis 10/19/2023 Few   Final    Macrocytosis 10/19/2023 1+   Final   Office Visit on 08/02/2023   Component Date Value Ref Range Status    POC Amphetamines 08/02/2023 Negative  Negative, Inconclusive Final    POC Barbiturates 08/02/2023 Negative  Negative, Inconclusive Final    POC Benzodiazepines 08/02/2023 Negative  Negative, Inconclusive Final    POC Cocaine 08/02/2023 Negative  Negative, Inconclusive Final    POC THC 08/02/2023 Negative  Negative, Inconclusive Final    POC Methadone 08/02/2023 Negative  Negative, Inconclusive Final    POC Methamphetamine 08/02/2023 Negative  Negative, Inconclusive Final    POC Opiates 08/02/2023 Presumptive Positive (A)  Negative, Inconclusive Final    POC Oxycodone 08/02/2023 Negative  Negative, Inconclusive Final    POC Phencyclidine 08/02/2023 Negative  Negative, Inconclusive Final    POC Methylenedioxymethamphetamine * 08/02/2023 Negative  Negative, Inconclusive Final    POC Tricyclic Antidepressants 08/02/2023 Negative  Negative, Inconclusive Final    POC Buprenorphine 08/02/2023 Negative   Final     Acceptable 08/02/2023 Yes   Final    POC Temperature (Urine) 08/02/2023 90   Final         No orders of the defined types were placed in this encounter.      Requested Prescriptions     Signed Prescriptions Disp Refills    gabapentin (NEURONTIN) 300 MG capsule 90 capsule 1     Sig: Take 1 capsule (300 mg total) by mouth every 8 (eight) hours.    HYDROcodone-acetaminophen (NORCO)  mg per tablet 90 tablet 0     Sig: Take 1 tablet by mouth every 8 (eight) hours as needed for Pain.    HYDROcodone-acetaminophen (NORCO)  mg per tablet 90 tablet 0     Sig: Take 1 tablet by mouth every 8 (eight) hours as needed for  Pain.       Assessment:     1. Rheumatoid arthritis involving multiple sites with positive rheumatoid factor    2. Ankylosing spondylitis of multiple sites in spine         A's of Opioid Risk Assessment  Activity:Patient can perform ADL.   Analgesia:Patients pain is partially controlled by current medication. Patient has tried OTC medications such as Tylenol and Ibuprofen with out relief.   Adverse Effects: Patient denies constipation or sedation.  Aberrant Behavior:  reviewed with no aberrant drug seeking/taking behavior.  Overdose reversal drug naloxone discussed    Drug screen reviewed      Plan:    Narcan January 2023    Follows rheumatology [facility] rheumatoid arthritis, ankylosing spondylitis     Cardiac bypass surgery [facility] postponed due to small-vessel disease, limited options for bypass     Anticoagulated after myocardial infarction    Cardiology has informed her she can not have:  Scope/procedures until further notice due to poor cardiac function      Chronic joint back pain related to underlying condition     She states current medication continues to help her discomfort    Continue current medication    Continue home exercise program as directed     Follow-up 2 months      Dr. Venegas, November 2024     Bring original prescription medication bottles/container/box with labels to each visit

## 2024-02-01 ENCOUNTER — OFFICE VISIT (OUTPATIENT)
Dept: RHEUMATOLOGY | Facility: CLINIC | Age: 44
End: 2024-02-01
Payer: MEDICARE

## 2024-02-01 ENCOUNTER — OFFICE VISIT (OUTPATIENT)
Dept: PAIN MEDICINE | Facility: CLINIC | Age: 44
End: 2024-02-01
Payer: MEDICARE

## 2024-02-01 VITALS
WEIGHT: 155 LBS | SYSTOLIC BLOOD PRESSURE: 95 MMHG | HEART RATE: 72 BPM | BODY MASS INDEX: 28.52 KG/M2 | DIASTOLIC BLOOD PRESSURE: 69 MMHG | RESPIRATION RATE: 18 BRPM | HEIGHT: 62 IN

## 2024-02-01 VITALS
HEIGHT: 62 IN | RESPIRATION RATE: 16 BRPM | SYSTOLIC BLOOD PRESSURE: 102 MMHG | OXYGEN SATURATION: 99 % | WEIGHT: 152.81 LBS | HEART RATE: 63 BPM | DIASTOLIC BLOOD PRESSURE: 68 MMHG | BODY MASS INDEX: 28.12 KG/M2

## 2024-02-01 DIAGNOSIS — M45.0 ANKYLOSING SPONDYLITIS OF MULTIPLE SITES IN SPINE: Chronic | ICD-10-CM

## 2024-02-01 DIAGNOSIS — M45.0 ANKYLOSING SPONDYLITIS OF MULTIPLE SITES IN SPINE: ICD-10-CM

## 2024-02-01 DIAGNOSIS — M05.79 RHEUMATOID ARTHRITIS INVOLVING MULTIPLE SITES WITH POSITIVE RHEUMATOID FACTOR: Chronic | ICD-10-CM

## 2024-02-01 DIAGNOSIS — M05.79 RHEUMATOID ARTHRITIS INVOLVING MULTIPLE SITES WITH POSITIVE RHEUMATOID FACTOR: Primary | Chronic | ICD-10-CM

## 2024-02-01 DIAGNOSIS — Z79.899 HIGH RISK MEDICATION USE: Primary | ICD-10-CM

## 2024-02-01 PROCEDURE — 99215 OFFICE O/P EST HI 40 MIN: CPT | Mod: PBBFAC | Performed by: PHYSICIAN ASSISTANT

## 2024-02-01 PROCEDURE — 99215 OFFICE O/P EST HI 40 MIN: CPT | Mod: PBBFAC,27 | Performed by: NURSE PRACTITIONER

## 2024-02-01 PROCEDURE — 99214 OFFICE O/P EST MOD 30 MIN: CPT | Mod: S$PBB,,, | Performed by: PHYSICIAN ASSISTANT

## 2024-02-01 PROCEDURE — 99214 OFFICE O/P EST MOD 30 MIN: CPT | Mod: S$PBB,,, | Performed by: NURSE PRACTITIONER

## 2024-02-01 RX ORDER — HYDROCODONE BITARTRATE AND ACETAMINOPHEN 10; 325 MG/1; MG/1
1 TABLET ORAL EVERY 8 HOURS PRN
Qty: 90 TABLET | Refills: 0 | Status: SHIPPED | OUTPATIENT
Start: 2024-03-19 | End: 2024-04-08 | Stop reason: SDUPTHER

## 2024-02-01 RX ORDER — METHOTREXATE 2.5 MG/1
TABLET ORAL
Qty: 72 TABLET | Refills: 1 | Status: SHIPPED | OUTPATIENT
Start: 2024-02-01 | End: 2024-04-15 | Stop reason: SDUPTHER

## 2024-02-01 RX ORDER — HYDROCODONE BITARTRATE AND ACETAMINOPHEN 10; 325 MG/1; MG/1
1 TABLET ORAL EVERY 8 HOURS PRN
Qty: 90 TABLET | Refills: 0 | Status: SHIPPED | OUTPATIENT
Start: 2024-02-18 | End: 2024-04-08 | Stop reason: SDUPTHER

## 2024-02-01 RX ORDER — GABAPENTIN 300 MG/1
300 CAPSULE ORAL EVERY 8 HOURS
Qty: 90 CAPSULE | Refills: 1 | Status: SHIPPED | OUTPATIENT
Start: 2024-02-01 | End: 2024-04-08 | Stop reason: SDUPTHER

## 2024-02-01 RX ORDER — FOLIC ACID 1 MG/1
1 TABLET ORAL DAILY
Qty: 90 TABLET | Refills: 1 | Status: SHIPPED | OUTPATIENT
Start: 2024-02-01 | End: 2024-04-15 | Stop reason: SDUPTHER

## 2024-02-01 NOTE — PROGRESS NOTES
RHEUMATOLOGY OUTPATIENT CLINIC NOTE    Subjective:       Patient ID: Cherry Smiley is a 43 y.o. female.    Chief Complaint: Rheumatoid Arthritis (Here for fu  had covid and just started back on humira last Wednesday   c/o loss of appetite after taking humira and last x's 1 week )       History of Present Illness: 42 y/o female presents to the clinic today to follow up on MTX use. Has not been seen since February. States that since the last time she was seen here she was sent to [facility] for possible CABG but was told that she would not survive the surgery. Has had 5 stents placed though. Is still having intermittent bleeding with bowel movements; however, cannot stop blood thinners per Cardiology to have colonoscopy. States that the MTX is no longer helping with her joint pains at all and is endorsing moderate pain in hands, arms, legs, hips, and back. No synovitis noted on exam today. Is currently still smoking cigarettes. History of seizures and is taking Keppra.     02/01/2024: Patient presents today for follow up. States that she has been sick with COVID, Flu, then COVID again since beginning of December and was instructed to hold Humira during that time. Has just restarted Humira and has taken one injection so far. States that when she took first injection that within 2 days she noticed nausea and was excessively drinking water. Then she became sick shortly after. States that she had last injection on  1/24/24 and will reach back out to us if nausea continues with next two injections. Is currently still smoking cigarettes.     Past Medical History:   Diagnosis Date    Allergy     Anxiety     Congenital hypothyroidism     Depressive disorder     GERD (gastroesophageal reflux disease)     Hyperlipidemia     Hypertension     Insomnia     Irritable bowel syndrome     Lumbosacral spondylosis     NSTEMI (non-ST elevated myocardial infarction) 05/09/2022    Primary fibromyalgia syndrome     Seizures     Sleep apnea   "   Type 2 diabetes mellitus      Social History     Tobacco Use    Smoking status: Former     Current packs/day: 1.00     Average packs/day: 1 pack/day for 35.0 years (35.0 ttl pk-yrs)     Types: Cigarettes    Smokeless tobacco: Never   Substance Use Topics    Alcohol use: Not Currently     Review of patient's allergies indicates:   Allergen Reactions    Cinnamon analogues Anaphylaxis    Iodine Anaphylaxis    Morpholine analogues Shortness Of Breath    Shellfish containing products Anaphylaxis    Adhesive Other (See Comments)     Heart monitor gel on leads caused chemical burn     Morphine Other (See Comments)    Pamelor [nortriptyline]        Objective:   /68   Pulse 63   Resp 16   Ht 5' 2" (1.575 m)   Wt 69.3 kg (152 lb 12.8 oz)   SpO2 99%   BMI 27.95 kg/m²       There is no immunization history on file for this patient.    Current Outpatient Medications   Medication Instructions    aspirin (ECOTRIN) 81 mg, Oral, Daily    atorvastatin (LIPITOR) 80 MG tablet Take 1 tablet by mouth in the evening    BD INSULIN SYRINGE ULTRA-FINE 0.5 mL 30 gauge x 1/2" Syrg USE SYRINGE THREE TIMES DAILY AS DIRECTED    dapagliflozin-metformin (XIGDUO XR) 5-500 mg TBph Oral    fenofibrate (TRICOR) 145 mg, Oral, Daily    folic acid (FOLVITE) 1 mg, Oral, Daily    gabapentin (NEURONTIN) 300 mg, Oral, Every 8 hours    HUMIRA(CF) PEN 40 mg, Subcutaneous, Every 14 days    HYDROcodone-acetaminophen (NORCO)  mg per tablet 1 tablet, Oral, Every 8 hours PRN    HYDROcodone-acetaminophen (NORCO)  mg per tablet 1 tablet, Oral, Every 8 hours PRN    [START ON 2/18/2024] HYDROcodone-acetaminophen (NORCO)  mg per tablet 1 tablet, Oral, Every 8 hours PRN    [START ON 3/19/2024] HYDROcodone-acetaminophen (NORCO)  mg per tablet 1 tablet, Oral, Every 8 hours PRN    insulin NPH-insulin regular, 70/30, (NOVOLIN 70/30) 100 unit/mL (70-30) injection 22 Units, Subcutaneous, 2 times daily before meals    LEVEMIR FLEXTOUCH " "U-100 INSULN 100 unit/mL (3 mL) InPn pen INJECT 40 UNITS SUBCUTANEOUSLY ONCE DAILY    magnesium oxide (MAG-OX) 400 mg (241.3 mg magnesium) tablet 1 tablet, Oral, Nightly    methotrexate 2.5 MG Tab TAKE 6 TABLETS BY MOUTH ONCE A WEEK    metoprolol succinate (TOPROL-XL) 200 MG 24 hr tablet Take 1 tablet by mouth once daily    naloxone (NARCAN) 4 mg/actuation Spry No dose, route, or frequency recorded.    nitroGLYCERIN (NITROSTAT) 0.4 mg, Sublingual, Every 5 min PRN, DO NOT EXCEED A TOTAL OF 3 DOSES IN 15 MINUTES    ONETOUCH ULTRA TEST Strp 1 strip, 3 times daily, To check blood sugar    pen needle, diabetic 32 gauge x 5/32" Ndle USE AS DIRECTED WITH LEVEMIR PEN    prochlorperazine (COMPAZINE) 10 MG tablet TAKE 1 TABLET BY MOUTH EVERY 6 HOURS AS NEEDED FOR MIGRAINE HEADACHE    ranolazine (RANEXA) 1,000 mg Tb12 No dose, route, or frequency recorded.    ticagrelor (BRILINTA) 90 mg, Oral, 2 times daily    topiramate (TOPAMAX) 25 MG tablet No dose, route, or frequency recorded.    ubrogepant (UBRELVY) 100 mg tablet Take at onset of migraine, may repeat in 2 hours, max 200 mg in 24 hours        BIOLOGIC LABS  Lab Results   Component Value Date    HEPCAB Reactive (A) 04/07/2022          RHEUM LABS  Lab Results   Component Value Date    CRP 0.64 10/19/2023     Assessment:     Review of Systems   Constitutional:  Negative for fever.   Eyes:  Negative for redness.   Respiratory:  Negative for cough.    Cardiovascular:  Negative for chest pain.   Gastrointestinal:  Positive for constipation, diarrhea and nausea.   Musculoskeletal:  Positive for back pain and joint pain. Negative for falls.   Skin:  Negative for rash.   Neurological:  Positive for headaches.   Endo/Heme/Allergies:  Bruises/bleeds easily.        Physical Exam   Constitutional: No distress.   Cardiovascular: Normal rate.   Pulmonary/Chest: Effort normal.   Musculoskeletal:         General: Normal range of motion.      Cervical back: No rigidity.   Neurological: She " is alert.   Psychiatric: Her behavior is normal. Mood normal.   Vitals reviewed.       1. High risk medication use    2. Ankylosing spondylitis of multiple sites in spine    3. Rheumatoid arthritis involving multiple sites with positive rheumatoid factor          I have reviewed the following:     Details / Date    [x]   Labs No labs drawn prior to visit    []   Micro     []   Pathology     []   Imaging     []   Cardiology Procedures     [x]   Other Chart Review       Plan:     CBC and CMP labs today  Continue MTX with folic acid  Continue Humira 40 mg SC every 14 days  Follow up virtually with Dr. Trinity Dewey.       30 minutes of total time spent on the encounter, which includes 3 minutes of smoking cessation counseling, face to face time and non-face to face time preparing to see the patient (eg, review of tests), Obtaining and/or reviewing separately obtained history, Documenting clinical information in the electronic or other health record, Independently interpreting results (not separately reported) and communicating results to the patient/family/caregiver, or Care coordination (not separately reported).            Carline Hsieh, JADENP  [facility] - RHEUMATOLOG[facility]  [address] [facility] [address]  [phone]

## 2024-02-02 ENCOUNTER — PATIENT MESSAGE (OUTPATIENT)
Dept: RHEUMATOLOGY | Facility: CLINIC | Age: 44
End: 2024-02-02
Payer: MEDICARE

## 2024-02-02 DIAGNOSIS — R79.89 ABNORMAL CBC: Primary | ICD-10-CM

## 2024-03-06 ENCOUNTER — OFFICE VISIT (OUTPATIENT)
Dept: RHEUMATOLOGY | Facility: CLINIC | Age: 44
End: 2024-03-06
Payer: MEDICARE

## 2024-03-06 DIAGNOSIS — D53.9 MACROCYTIC ANEMIA: ICD-10-CM

## 2024-03-06 DIAGNOSIS — Z79.899 HIGH RISK MEDICATION USE: Primary | ICD-10-CM

## 2024-03-06 DIAGNOSIS — M45.0 ANKYLOSING SPONDYLITIS OF MULTIPLE SITES IN SPINE: ICD-10-CM

## 2024-03-06 PROCEDURE — 99215 OFFICE O/P EST HI 40 MIN: CPT | Mod: GT,,, | Performed by: INTERNAL MEDICINE

## 2024-03-06 RX ORDER — WITCH HAZEL 50 %
2000 PADS, MEDICATED (EA) TOPICAL DAILY
Qty: 30 TABLET | Refills: 3 | Status: SHIPPED | OUTPATIENT
Start: 2024-03-06

## 2024-03-06 NOTE — PROGRESS NOTES
Trinity Dewey MD   [facility] RHEUMATOLOGY  [address] [address]  [phone]      PATIENT NAME: Cherry Smiley  : 1980  DATE: 3/6/24  MRN: [id number]      Billing Provider: Trinity Dewey MD  Level of Service:   Patient PCP Information       Provider PCP Type    King Griffin MD General            The patient location is: home  The chief complaint leading to consultation is:abnormal lab review    Visit type: audio-visual    Face to Face time with patient: 30   30  minutes of total time spent on the encounter, which includes face to face time and non-face to face time preparing to see the patient (eg, review of tests), Obtaining and/or reviewing separately obtained history, Documenting clinical information in the electronic or other health record, Independently interpreting results (not separately reported) and communicating results to the patient/family/caregiver, or Care coordination (not separately reported).         Each patient to whom he or she provides medical services by telemedicine is:  (1) informed of the relationship between the physician and patient and the respective role of any other health care provider with respect to management of the patient; and (2) notified that he or she may decline to receive medical services by telemedicine and may withdraw from such care at any time.    Notes:       Reason for Visit / Chief Complaint: No chief complaint on file.           Assessment and Plan (including Health Maintenance)      Problem List  Smart Sets  Document Outside HM   :    Plan:     There are no diagnoses linked to this encounter. Patient states she is tolerating mtx.   However, today presented with chest pain which in intermittent and started 3 days ago. Patient relates severe stress at home related to daughter's pregnancy.   CP is in setting of /110 with radiation to left shoulder and numbness down left arm.   Patient is advised  to immediately go to ED. May need cardiac evaluation.   Should continue methotrexate upon discharge and set up rheumatology followup after cardiac workup.   Patient is also Hep C antibody positive and viral RNA titers are unknown.     07/20/2022;  S/p CABG since last visit on Brilinta and Metoprolol.  Would continue methotrexate 6 tab/ week.   Reminder to take folic acid    Feb 1 2023:  Has had multiple cardiac catheterizations over the last year.   C/o GI issues now. Not clear if related to mtx because she also has blood in her stool.   She is hepatitis C ++   Needs asap GI referral Dr Ezequiel Miles.     Nov 1 2023;  Changes to meds; lisinopril held due to syncope.   States she has global joint pain and swelling.   Can hardly bend her knees and hands.   Is compliant with taking methotrexate which she takes every Wednesday morning.   Will prescribe adalimumab biosimilar [ hadlima or any other]. Prior auth through [facility].  Will need Hep C viral panel ordered by NP at next visit in 3 months.   MD f/up in 6 months.     03/06/2024;   States humira is suiting her. Joints are better.   Did experience nausea upon starting humira.   Nausea has improved significantly.   Fingers are better. Able to form fist. Able to flex knees now.   I did inform patient about progressive CKD [ Cr elevated on most recent labs. This could be related to episode of renal stones and UTI earlier this month]. Patient is also diabetic.  Continue mtx and folic acid [ MCV is elevated] . Encouraged to take vitamin B12    FUP with NP in 3 months [ labs prior ]   FUP MD 7 months.       ICD-10-CM ICD-9-CM    1. High risk medication use  Z79.899 V58.69 cyanocobalamin (VITAMIN B-12) 2000 MCG tablet      2. Ankylosing spondylitis of multiple sites in spine  M45.0 720.0 cyanocobalamin (VITAMIN B-12) 2000 MCG tablet      3. Macrocytic anemia  D53.9 281.9 cyanocobalamin (VITAMIN B-12) 2000 MCG tablet             Total time spent in Assessment, Co-ordination of Care  , Review of Care Plan , Goal Setting and Counseling on this initial visit is 40 minutes     There is currently no information documented on the homunculus. Go to the Rheumatology activity and complete the homunculus joint exam.               History of Present Illness / Problem Focused Workflow     Cherry Smiley presents to the clinic with No chief complaint on file.     Dx with RA 3 years ago. Was experiencing swelling in her small joints including hands, wrists and elbows.   Also had bilateral rotator cuff injuries.   Hx of childhood traumatic fractures of the right patella and R foot. All were traumatic.   Patient states the epidural injections to the spine are not helping.   States she is unable to move when she awakens in the morning. Stiffness is severe.    who accompanies her relates that she is not able to get in and out of a car easily.   Patient is also on Percocet twice daily  No h/o DMARD initiation. States her rheumatologist has her on NSAIDs although she has not followed up since 2019.   Is active smoker - one pack per day   Significant dental cavities.   Does have h/o synovitis or swelling in her knees.   Also has h/o chest pain.       Review of Systems     Review of Systems   Constitutional:  Positive for unexpected weight change. Negative for fever.   HENT:  Negative for mouth sores and trouble swallowing.    Eyes:  Negative for redness.   Respiratory:  Positive for shortness of breath. Negative for cough.    Cardiovascular:  Positive for chest pain.   Gastrointestinal:  Negative for constipation and diarrhea.   Genitourinary:  Negative for dysuria and genital sores.   Skin:  Negative for rash.   Neurological:  Positive for headaches.   Hematological:  Bruises/bleeds easily.       Medical / Social / Family History     Past Medical History:   Diagnosis Date    Allergy     Anxiety     Congenital hypothyroidism     Depressive disorder     GERD (gastroesophageal reflux disease)      Hyperlipidemia     Hypertension     Insomnia     Irritable bowel syndrome     Lumbosacral spondylosis     NSTEMI (non-ST elevated myocardial infarction) 05/09/2022    Primary fibromyalgia syndrome     Seizures     Sleep apnea     Type 2 diabetes mellitus        Past Surgical History:   Procedure Laterality Date    ADENOIDECTOMY      ANGIOGRAM, CORONARY, WITH LEFT HEART CATHETERIZATION N/A 05/09/2022    Procedure: Angiogram, Coronary, with Left Heart Cath;  Surgeon: Arthur Dewey MD;  Location: [facility] CATH LAB;  Service: Cardiology;  Laterality: N/A;    ELECTROCARDIOGRAPHY      EPIDURAL STEROID INJECTION  09/02/2020    C5-6 FABIANO - Dr Venegas    ESOPHAGOGASTRODUODENOSCOPY      HYSTERECTOMY      INJECTION OF FACET JOINT Left 12/27/2018    Left C6-7 Facet Injection - Deb    INJECTION OF FACET JOINT Right 05/19/2019    Right C4-7 Facet Injection X 2 -Deb    INJECTION OF FACET JOINT Left 02/27/2019    Left C4-7 Facet Injection -Deb    knee bilateral      LEFT HEART CATHETERIZATION N/A 09/15/2022    Procedure: Left heart cath;  Surgeon: Arthur Dewey MD;  Location: [facility] CATH LAB;  Service: Cardiology;  Laterality: N/A;    RADIOFREQUENCY THERMOCOAGULATION Right 05/21/2019    Right C4-7 RFTC - Deb    RADIOFREQUENCY THERMOCOAGULATION Left 04/23/2019    Left C4-7 RFTC -Deb    shoulder bilateral      TONSILLECTOMY         Social History  Ms. Cherry Smiley  reports that she has quit smoking. Her smoking use included cigarettes. She has a 35.0 pack-year smoking history. She has never used smokeless tobacco. She reports that she does not currently use alcohol. She reports that she does not currently use drugs after having used the following drugs: Marijuana.    Family History  Ms.'liliana Smiley family history includes Cancer in her brother; Diabetes in her brother and mother; Heart disease in her father and mother; Hypertension in her brother, father, and mother; Lupus in her father; Stroke in her father  and mother.    Medications and Allergies     Medications  No outpatient medications have been marked as taking for the 3/6/24 encounter (Appointment) with Trinity Dewey MD.       Allergies  Review of patient's allergies indicates:   Allergen Reactions    Cinnamon analogues Anaphylaxis    Iodine Anaphylaxis    Morpholine analogues Shortness Of Breath    Shellfish containing products Anaphylaxis    Adhesive Other (See Comments)     Heart monitor gel on leads caused chemical burn     Morphine Other (See Comments)    Pamelor [nortriptyline]        Physical Examination     There were no vitals filed for this visit.    Physical Exam  Constitutional:       Appearance: She is obese.   HENT:      Head: Normocephalic and atraumatic.      Right Ear: External ear normal.      Left Ear: External ear normal.      Nose: Nose normal. No congestion or rhinorrhea.   Eyes:      Extraocular Movements: Extraocular movements intact.      Pupils: Pupils are equal, round, and reactive to light.   Cardiovascular:      Pulses: Normal pulses.   Pulmonary:      Effort: Pulmonary effort is normal.      Breath sounds: Normal breath sounds. No wheezing.   Chest:      Chest wall: No tenderness.   Musculoskeletal:         General: Swelling and tenderness present. Normal range of motion.      Cervical back: No rigidity or tenderness.      Comments: Multiple tender and swollen joints MCP ++   Spinal stiffness +    Lymphadenopathy:      Cervical: No cervical adenopathy.   Skin:     Findings: No rash.   Neurological:      General: No focal deficit present.      Mental Status: She is alert and oriented to person, place, and time.   Psychiatric:         Mood and Affect: Mood normal.         Thought Content: Thought content normal.                Signature:  Trinity Dewey MD  [facility] RHEUMATOLOGY  [address] [place] [id number]  [phone]    Date of encounter: 3/6/24

## 2024-03-28 ENCOUNTER — PATIENT MESSAGE (OUTPATIENT)
Dept: ADMINISTRATIVE | Facility: OTHER | Age: 44
End: 2024-03-28

## 2024-04-03 ENCOUNTER — TELEPHONE (OUTPATIENT)
Dept: RHEUMATOLOGY | Facility: CLINIC | Age: 44
End: 2024-04-03
Payer: MEDICARE

## 2024-04-03 ENCOUNTER — PATIENT MESSAGE (OUTPATIENT)
Dept: NEPHROLOGY | Facility: CLINIC | Age: 44
End: 2024-04-03
Payer: MEDICARE

## 2024-04-03 ENCOUNTER — TELEPHONE (OUTPATIENT)
Dept: NEPHROLOGY | Facility: CLINIC | Age: 44
End: 2024-04-03
Payer: MEDICARE

## 2024-04-03 NOTE — TELEPHONE ENCOUNTER
Sent patient in-basket message to notify them of cancellation of Rheumatology appointment due to Dr Dewey no longer practicing at [facility]. Patient was instructed to visit [email or web address] or call [phone] to work with our local Access to Care team to schedule an appointment with the provider who will best fit their needs.

## 2024-04-04 ENCOUNTER — PATIENT MESSAGE (OUTPATIENT)
Dept: RHEUMATOLOGY | Facility: CLINIC | Age: 44
End: 2024-04-04
Payer: MEDICARE

## 2024-04-04 ENCOUNTER — TELEPHONE (OUTPATIENT)
Dept: RHEUMATOLOGY | Facility: CLINIC | Age: 44
End: 2024-04-04
Payer: MEDICARE

## 2024-04-04 NOTE — TELEPHONE ENCOUNTER
Sent patient portal message to notify them of cancellation of Rheumatology appointment due to longer having Rheumatology coverage at [facility]. Patient was instructed to visit [email or web address] or call [phone] to work with our local Access to Care team to schedule an appointment with the provider who will best fit their needs.

## 2024-04-08 NOTE — PROGRESS NOTES
Subjective:         Patient ID: Cherry Smiley is a 43 y.o. female.    Chief Complaint: Low-back Pain and Hand Pain (right)        Pain  This is a chronic problem. The current episode started more than 1 year ago. The problem occurs daily. The problem has been waxing and waning. Associated symptoms include arthralgias and neck pain. Pertinent negatives include no anorexia, chest pain, chills, coughing, diaphoresis, fever, sore throat, vertigo or vomiting.     Review of Systems   Constitutional:  Negative for activity change, appetite change, chills, diaphoresis, fever and unexpected weight change.   HENT:  Negative for drooling, ear discharge, facial swelling, nosebleeds, sore throat, trouble swallowing, voice change and goiter.    Eyes:  Negative for photophobia, pain, discharge, redness and visual disturbance.   Respiratory:  Negative for apnea, cough, choking, chest tightness, shortness of breath, wheezing and stridor.    Cardiovascular:  Negative for chest pain, palpitations and leg swelling.   Gastrointestinal:  Negative for abdominal distention, anorexia, diarrhea, rectal pain, vomiting and fecal incontinence.   Endocrine: Negative for cold intolerance, heat intolerance, polydipsia, polyphagia and polyuria.   Genitourinary:  Negative for flank pain, frequency and hot flashes.   Musculoskeletal:  Positive for arthralgias, back pain, neck pain and neck stiffness.   Integumentary:  Negative for color change and pallor.   Allergic/Immunologic: Negative for immunocompromised state.   Neurological:  Negative for dizziness, vertigo, seizures, syncope, facial asymmetry, speech difficulty, light-headedness, memory loss and coordination difficulties.   Hematological:  Negative for adenopathy. Does not bruise/bleed easily.   Psychiatric/Behavioral:  Negative for agitation, behavioral problems, confusion, decreased concentration, dysphoric mood, hallucinations, self-injury and suicidal ideas. The patient is not  nervous/anxious and is not hyperactive.            Past Medical History:   Diagnosis Date    Allergy     Anxiety     Congenital hypothyroidism     Depressive disorder     GERD (gastroesophageal reflux disease)     Hyperlipidemia     Hypertension     Insomnia     Irritable bowel syndrome     Lumbosacral spondylosis     NSTEMI (non-ST elevated myocardial infarction) 05/09/2022    Primary fibromyalgia syndrome     Seizures     Sleep apnea     Type 2 diabetes mellitus      Past Surgical History:   Procedure Laterality Date    ADENOIDECTOMY      ANGIOGRAM, CORONARY, WITH LEFT HEART CATHETERIZATION N/A 05/09/2022    Procedure: Angiogram, Coronary, with Left Heart Cath;  Surgeon: Arthur Dewey MD;  Location: [facility] CATH LAB;  Service: Cardiology;  Laterality: N/A;    ELECTROCARDIOGRAPHY      EPIDURAL STEROID INJECTION  09/02/2020    C5-6 [facility] - Dr Venegas    ESOPHAGOGASTRODUODENOSCOPY      HYSTERECTOMY      INJECTION OF FACET JOINT Left 12/27/2018    Left C6-7 Facet Injection - Deb    INJECTION OF FACET JOINT Right 05/19/2019    Right C4-7 Facet Injection X 2 -Deb    INJECTION OF FACET JOINT Left 02/27/2019    Left C4-7 Facet Injection -Deb    knee bilateral      LEFT HEART CATHETERIZATION N/A 09/15/2022    Procedure: Left heart cath;  Surgeon: Arthur Dewey MD;  Location: [facility] CATH LAB;  Service: Cardiology;  Laterality: N/A;    RADIOFREQUENCY THERMOCOAGULATION Right 05/21/2019    Right C4-7 RFTC - Deb    RADIOFREQUENCY THERMOCOAGULATION Left 04/23/2019    Left C4-7 [facility] -Deb    shoulder bilateral      TONSILLECTOMY       Social History     Socioeconomic History    Marital status:    Tobacco Use    Smoking status: Former     Current packs/day: 1.00     Average packs/day: 1 pack/day for 35.0 years (35.0 ttl pk-yrs)     Types: Cigarettes    Smokeless tobacco: Never   Substance and Sexual Activity    Alcohol use: Not Currently    Drug use: Not Currently     Types: Marijuana     Family History  "  Problem Relation Age of Onset    Stroke Mother     Diabetes Mother     Heart disease Mother     Hypertension Mother     Hypertension Father     Heart disease Father     Stroke Father     Lupus Father     Cancer Brother     Diabetes Brother     Hypertension Brother      Review of patient's allergies indicates:   Allergen Reactions    Cinnamon analogues Anaphylaxis    Iodine Anaphylaxis    Morpholine analogues Shortness Of Breath    Shellfish containing products Anaphylaxis    Adhesive Other (See Comments)     Heart monitor gel on leads caused chemical burn     Morphine Other (See Comments)    Pamelor [nortriptyline]         Objective:  Vitals:    04/11/24 1036   BP: (!) 88/61   Pulse: 72   Resp: 18   Weight: 68 kg (150 lb)   Height: 5' 2" (1.575 m)   PainSc:   4         Physical Exam  Vitals and nursing note reviewed. Exam conducted with a chaperone present.   Constitutional:       General: She is awake. She is not in acute distress.     Appearance: Normal appearance. She is not ill-appearing, toxic-appearing or diaphoretic.   HENT:      Head: Normocephalic and atraumatic.      Nose: Nose normal.      Mouth/Throat:      Mouth: Mucous membranes are moist.      Pharynx: Oropharynx is clear.   Eyes:      Conjunctiva/sclera: Conjunctivae normal.      Pupils: Pupils are equal, round, and reactive to light.   Cardiovascular:      Rate and Rhythm: Normal rate.   Pulmonary:      Effort: Pulmonary effort is normal. No respiratory distress.   Abdominal:      Palpations: Abdomen is soft.   Musculoskeletal:         General: Normal range of motion.      Right shoulder: Tenderness present.      Left shoulder: Tenderness present.      Right wrist: Tenderness present.      Left wrist: Tenderness present.      Cervical back: Normal range of motion and neck supple. Tenderness present.      Thoracic back: Tenderness present.      Lumbar back: Tenderness present.   Skin:     General: Skin is warm and dry.   Neurological:      General: " No focal deficit present.      Mental Status: She is alert and oriented to person, place, and time. Mental status is at baseline.      Cranial Nerves: No cranial nerve deficit (II-XII).   Psychiatric:         Mood and Affect: Mood normal.         Behavior: Behavior normal. Behavior is cooperative.         Thought Content: Thought content normal.           US Abdomen Complete  Narrative: EXAMINATION:  US ABDOMEN COMPLETE    CLINICAL HISTORY:  Abdominal Pain    TECHNIQUE:  Grayscale and color Doppler imaging performed    COMPARISON:  None available    FINDINGS:  The liver is normal in size and echogenicity. The gallbladder is fluid-filled without evidence of stones or sludge. The gallbladder wall thickness is 2.0 mm. The common bile duct measures 2.0 mm.    The visualized portion of the pancreas appear within normal limits    The kidneys are normal in size and echogenicity without hydronephrosis or other abnormality. The right renal length is 11.3 cm. Left renal length is 11.1 cm.    Spleen, aorta and IVC appear within normal limits. No free fluid or free air seen.  Impression: No evidence of abnormality demonstrated.    Ultrasound images stored and captured.    Electronically signed by: Eren James  Date:    02/15/2023  Time:    09:16         Lab Visit on 02/01/2024   Component Date Value Ref Range Status    Sodium 02/01/2024 138  136 - 145 mmol/L Final    Potassium 02/01/2024 3.9  3.5 - 5.1 mmol/L Final    Chloride 02/01/2024 109 (H)  98 - 107 mmol/L Final    CO2 02/01/2024 26  21 - 32 mmol/L Final    Anion Gap 02/01/2024 7  7 - 16 mmol/L Final    Glucose 02/01/2024 149 (H)  74 - 106 mg/dL Final    BUN 02/01/2024 17  7 - 18 mg/dL Final    Creatinine 02/01/2024 1.28 (H)  0.55 - 1.02 mg/dL Final    BUN/Creatinine Ratio 02/01/2024 13  6 - 20 Final    Calcium 02/01/2024 9.2  8.5 - 10.1 mg/dL Final    Total Protein 02/01/2024 7.3  6.4 - 8.2 g/dL Final    Albumin 02/01/2024 3.7  3.5 - 5.0 g/dL Final    Globulin  02/01/2024 3.6  2.0 - 4.0 g/dL Final    A/G Ratio 02/01/2024 1.0   Final    Bilirubin, Total 02/01/2024 0.4  >0.0 - 1.2 mg/dL Final    Alk Phos 02/01/2024 44  39 - 100 U/L Final    ALT 02/01/2024 20  13 - 56 U/L Final    AST 02/01/2024 10 (L)  15 - 37 U/L Final    eGFR 02/01/2024 53 (L)  >=60 mL/min/1.73m2 Final    WBC 02/01/2024 9.97  4.50 - 11.00 K/uL Final    RBC 02/01/2024 3.47 (L)  4.20 - 5.40 M/uL Final    Hemoglobin 02/01/2024 12.5  12.0 - 16.0 g/dL Final    Hematocrit 02/01/2024 37.5 (L)  38.0 - 47.0 % Final    MCV 02/01/2024 108.1 (H)  80.0 - 96.0 fL Final    MCH 02/01/2024 36.0 (H)  27.0 - 31.0 pg Final    MCHC 02/01/2024 33.3  32.0 - 36.0 g/dL Final    RDW 02/01/2024 15.6 (H)  11.5 - 14.5 % Final    Platelet Count 02/01/2024 349  150 - 400 K/uL Final    MPV 02/01/2024 9.6  9.4 - 12.4 fL Final    Neutrophils % 02/01/2024 57.2  53.0 - 65.0 % Final    Lymphocytes % 02/01/2024 30.3  27.0 - 41.0 % Final    Monocytes % 02/01/2024 8.3 (H)  2.0 - 6.0 % Final    Eosinophils % 02/01/2024 3.1  1.0 - 4.0 % Final    Basophils % 02/01/2024 0.7  0.0 - 1.0 % Final    Immature Granulocytes % 02/01/2024 0.4  0.0 - 0.4 % Final    nRBC, Auto 02/01/2024 0.0  <=0.0 % Final    Neutrophils, Abs 02/01/2024 5.70  1.80 - 7.70 K/uL Final    Lymphocytes, Absolute 02/01/2024 3.02  1.00 - 4.80 K/uL Final    Monocytes, Absolute 02/01/2024 0.83 (H)  0.00 - 0.80 K/uL Final    Eosinophils, Absolute 02/01/2024 0.31  0.00 - 0.50 K/uL Final    Basophils, Absolute 02/01/2024 0.07  0.00 - 0.20 K/uL Final    Immature Granulocytes, Absolute 02/01/2024 0.04  0.00 - 0.04 K/uL Final    nRBC, Absolute 02/01/2024 0.00  <=0.00 x10e3/uL Final    Diff Type 02/01/2024 Scan Smear   Final    Platelet Morphology 02/01/2024 Few Large Platelets (A)  Normal Final    Macrocytosis 02/01/2024 1+   Final   Office Visit on 12/04/2023   Component Date Value Ref Range Status    POC Amphetamines 12/04/2023 Negative  Negative, Inconclusive Final    POC Barbiturates  12/04/2023 Negative  Negative, Inconclusive Final    POC Benzodiazepines 12/04/2023 Negative  Negative, Inconclusive Final    POC Cocaine 12/04/2023 Negative  Negative, Inconclusive Final    POC THC 12/04/2023 Negative  Negative, Inconclusive Final    POC Methadone 12/04/2023 Negative  Negative, Inconclusive Final    POC Methamphetamine 12/04/2023 Negative  Negative, Inconclusive Final    POC Opiates 12/04/2023 Presumptive Positive (A)  Negative, Inconclusive Final    POC Oxycodone 12/04/2023 Negative  Negative, Inconclusive Final    POC Phencyclidine 12/04/2023 Negative  Negative, Inconclusive Final    POC Methylenedioxymethamphetamine * 12/04/2023 Negative  Negative, Inconclusive Final    POC Tricyclic Antidepressants 12/04/2023 Negative  Negative, Inconclusive Final    POC Buprenorphine 12/04/2023 Negative   Final     Acceptable 12/04/2023 Yes   Final    POC Temperature (Urine) 12/04/2023 92   Final   Lab Visit on 10/19/2023   Component Date Value Ref Range Status    HCV RNA Detect/Quant 10/19/2023 Undetected  Undetected IU/mL Final    CCP 10/19/2023 <16.0  <=19.9 units Final    RA 10/19/2023 Negative  Negative Final    RA Titer 10/19/2023 <10  0 - 15 IU/mL Final    ESR Westergren 10/19/2023 36 (H)  0 - 20 mm/Hr Final    CRP 10/19/2023 0.64  0.00 - 0.80 mg/dL Final    WBC 10/19/2023 11.14 (H)  4.50 - 11.00 K/uL Final    RBC 10/19/2023 3.41 (L)  4.20 - 5.40 M/uL Final    Hemoglobin 10/19/2023 12.3  12.0 - 16.0 g/dL Final    Hematocrit 10/19/2023 36.1 (L)  38.0 - 47.0 % Final    MCV 10/19/2023 105.9 (H)  80.0 - 96.0 fL Final    MCH 10/19/2023 36.1 (H)  27.0 - 31.0 pg Final    MCHC 10/19/2023 34.1  32.0 - 36.0 g/dL Final    RDW 10/19/2023 15.5 (H)  11.5 - 14.5 % Final    Platelet Count 10/19/2023 437 (H)  150 - 400 K/uL Final    MPV 10/19/2023 9.9  9.4 - 12.4 fL Final    Neutrophils % 10/19/2023 62.0  53.0 - 65.0 % Final    Lymphocytes % 10/19/2023 28.5  27.0 - 41.0 % Final    Monocytes % 10/19/2023  5.7  2.0 - 6.0 % Final    Eosinophils % 10/19/2023 2.6  1.0 - 4.0 % Final    Basophils % 10/19/2023 0.6  0.0 - 1.0 % Final    Immature Granulocytes % 10/19/2023 0.6 (H)  0.0 - 0.4 % Final    nRBC, Auto 10/19/2023 0.0  <=0.0 % Final    Neutrophils, Abs 10/19/2023 6.90  1.80 - 7.70 K/uL Final    Lymphocytes, Absolute 10/19/2023 3.17  1.00 - 4.80 K/uL Final    Monocytes, Absolute 10/19/2023 0.64  0.00 - 0.80 K/uL Final    Eosinophils, Absolute 10/19/2023 0.29  0.00 - 0.50 K/uL Final    Basophils, Absolute 10/19/2023 0.07  0.00 - 0.20 K/uL Final    Immature Granulocytes, Absolute 10/19/2023 0.07 (H)  0.00 - 0.04 K/uL Final    nRBC, Absolute 10/19/2023 0.00  <=0.00 x10e3/uL Final    Diff Type 10/19/2023 Scan Smear   Final    Platelet Morphology 10/19/2023 Few Large Platelets (A)  Normal Final    Anisocytosis 10/19/2023 Few   Final    Macrocytosis 10/19/2023 1+   Final         Orders Placed This Encounter   Procedures    POCT Urine Drug Screen Presump     Interpretive Information:     Negative:  No drug detected at the cut off level.   Positive:  This result represents presumptive positive for the   tested drug, other substances may yield a positive response other   than the analyte of interest. This result should be utilized for   diagnostic purpose only. Confirmation testing will be performed upon physician request only.          Requested Prescriptions     Signed Prescriptions Disp Refills    gabapentin (NEURONTIN) 300 MG capsule 90 capsule 1     Sig: Take 1 capsule (300 mg total) by mouth every 8 (eight) hours.    HYDROcodone-acetaminophen (NORCO)  mg per tablet 90 tablet 0     Sig: Take 1 tablet by mouth every 8 (eight) hours as needed for Pain.    HYDROcodone-acetaminophen (NORCO)  mg per tablet 90 tablet 0     Sig: Take 1 tablet by mouth every 8 (eight) hours as needed for Pain.       Assessment:     1. Rheumatoid arthritis involving multiple sites with positive rheumatoid factor    2. Ankylosing  spondylitis of multiple sites in spine    3. Encounter for long-term (current) use of other medications         A's of Opioid Risk Assessment  Activity:Patient can perform ADL.   Analgesia:Patients pain is partially controlled by current medication. Patient has tried OTC medications such as Tylenol and Ibuprofen with out relief.   Adverse Effects: Patient denies constipation or sedation.  Aberrant Behavior:  reviewed with no aberrant drug seeking/taking behavior.  Overdose reversal drug naloxone discussed    Drug screen reviewed      Plan:    Narcan January 2023    Follows rheumatology [facility] rheumatoid arthritis, ankylosing spondylitis     Cardiac bypass surgery [facility] postponed due to small-vessel disease, limited options for bypass     Anticoagulated after multiple myocardial infarction    Cardiology has informed her she can not have:  Scope/procedures until further notice due to poor cardiac function      Chronic joint back pain related to underlying condition     No new complaints today she states current medication is helping control her chronic discomfort    Continue current medication    Will continue home exercise program as directed     Follow-up 2 months      Dr. Venegas, November 2024     Bring original prescription medication bottles/container/box with labels to each visit

## 2024-04-11 ENCOUNTER — PATIENT MESSAGE (OUTPATIENT)
Dept: RHEUMATOLOGY | Facility: CLINIC | Age: 44
End: 2024-04-11
Payer: MEDICARE

## 2024-04-11 ENCOUNTER — OFFICE VISIT (OUTPATIENT)
Dept: PAIN MEDICINE | Facility: CLINIC | Age: 44
End: 2024-04-11
Payer: MEDICARE

## 2024-04-11 VITALS
WEIGHT: 150 LBS | HEART RATE: 72 BPM | SYSTOLIC BLOOD PRESSURE: 88 MMHG | HEIGHT: 62 IN | RESPIRATION RATE: 18 BRPM | DIASTOLIC BLOOD PRESSURE: 61 MMHG | BODY MASS INDEX: 27.6 KG/M2

## 2024-04-11 DIAGNOSIS — M05.79 RHEUMATOID ARTHRITIS INVOLVING MULTIPLE SITES WITH POSITIVE RHEUMATOID FACTOR: Chronic | ICD-10-CM

## 2024-04-11 DIAGNOSIS — M45.0 ANKYLOSING SPONDYLITIS OF MULTIPLE SITES IN SPINE: Chronic | ICD-10-CM

## 2024-04-11 DIAGNOSIS — M45.0 ANKYLOSING SPONDYLITIS OF MULTIPLE SITES IN SPINE: ICD-10-CM

## 2024-04-11 DIAGNOSIS — M05.79 RHEUMATOID ARTHRITIS INVOLVING MULTIPLE SITES WITH POSITIVE RHEUMATOID FACTOR: Primary | Chronic | ICD-10-CM

## 2024-04-11 DIAGNOSIS — Z79.899 ENCOUNTER FOR LONG-TERM (CURRENT) USE OF OTHER MEDICATIONS: ICD-10-CM

## 2024-04-11 PROCEDURE — 80305 DRUG TEST PRSMV DIR OPT OBS: CPT | Mod: PBBFAC | Performed by: PHYSICIAN ASSISTANT

## 2024-04-11 PROCEDURE — 99999PBSHW POCT URINE DRUG SCREEN PRESUMP: Mod: PBBFAC,,,

## 2024-04-11 PROCEDURE — 99214 OFFICE O/P EST MOD 30 MIN: CPT | Mod: S$PBB,,, | Performed by: PHYSICIAN ASSISTANT

## 2024-04-11 PROCEDURE — 99215 OFFICE O/P EST HI 40 MIN: CPT | Mod: PBBFAC | Performed by: PHYSICIAN ASSISTANT

## 2024-04-11 RX ORDER — HYDROCODONE BITARTRATE AND ACETAMINOPHEN 10; 325 MG/1; MG/1
1 TABLET ORAL EVERY 8 HOURS PRN
Qty: 90 TABLET | Refills: 0 | Status: SHIPPED | OUTPATIENT
Start: 2024-04-20 | End: 2024-06-04 | Stop reason: SDUPTHER

## 2024-04-11 RX ORDER — HYDROCODONE BITARTRATE AND ACETAMINOPHEN 10; 325 MG/1; MG/1
1 TABLET ORAL EVERY 8 HOURS PRN
Qty: 90 TABLET | Refills: 0 | Status: SHIPPED | OUTPATIENT
Start: 2024-05-20 | End: 2024-05-21 | Stop reason: SDUPTHER

## 2024-04-11 RX ORDER — GABAPENTIN 300 MG/1
300 CAPSULE ORAL EVERY 8 HOURS
Qty: 90 CAPSULE | Refills: 1 | Status: SHIPPED | OUTPATIENT
Start: 2024-04-11 | End: 2024-06-04 | Stop reason: SDUPTHER

## 2024-04-15 RX ORDER — METHOTREXATE 2.5 MG/1
15 TABLET ORAL
Qty: 72 TABLET | Refills: 0 | Status: SHIPPED | OUTPATIENT
Start: 2024-04-15 | End: 2024-07-14

## 2024-04-15 RX ORDER — FOLIC ACID 1 MG/1
1 TABLET ORAL DAILY
Qty: 90 TABLET | Refills: 1 | Status: SHIPPED | OUTPATIENT
Start: 2024-04-15 | End: 2024-10-12

## 2024-04-15 RX ORDER — ADALIMUMAB 40MG/0.4ML
40 KIT SUBCUTANEOUS
Qty: 2 PEN | Refills: 2 | Status: ACTIVE | OUTPATIENT
Start: 2024-04-15 | End: 2024-07-14

## 2024-05-21 DIAGNOSIS — M05.79 RHEUMATOID ARTHRITIS INVOLVING MULTIPLE SITES WITH POSITIVE RHEUMATOID FACTOR: Chronic | ICD-10-CM

## 2024-05-21 DIAGNOSIS — M45.0 ANKYLOSING SPONDYLITIS OF MULTIPLE SITES IN SPINE: Chronic | ICD-10-CM

## 2024-05-21 RX ORDER — HYDROCODONE BITARTRATE AND ACETAMINOPHEN 10; 325 MG/1; MG/1
1 TABLET ORAL EVERY 8 HOURS PRN
Qty: 90 TABLET | Refills: 0 | Status: SHIPPED | OUTPATIENT
Start: 2024-05-21 | End: 2024-06-04 | Stop reason: SDUPTHER

## 2024-05-21 RX ORDER — HYDROCODONE BITARTRATE AND ACETAMINOPHEN 10; 325 MG/1; MG/1
1 TABLET ORAL EVERY 8 HOURS PRN
Qty: 90 TABLET | Refills: 0 | Status: CANCELLED | OUTPATIENT
Start: 2024-05-21

## 2024-05-21 NOTE — TELEPHONE ENCOUNTER
----- Message from Freida Christopher sent at 5/21/2024  3:10 PM CDT -----  Regarding: Call back  Pt says the pharmacy told her that her prescription is not eligible for refill because it was sent as 90 pills for 90 days but should be 90 pills 3x a day for this month. [phone]    NEETA SPEARS   05/21/2024   3:32 PM   Medications send. Patient notified

## 2024-06-04 NOTE — PROGRESS NOTES
Subjective:         Patient ID: Cherry Smiley is a 43 y.o. female.    Chief Complaint: Low-back Pain and Hip Pain        Pain  This is a chronic problem. The current episode started more than 1 year ago. The problem occurs daily. The problem has been unchanged. Associated symptoms include arthralgias and neck pain. Pertinent negatives include no anorexia, chest pain, chills, coughing, diaphoresis, fever, sore throat, vertigo or vomiting.     Review of Systems   Constitutional:  Negative for activity change, appetite change, chills, diaphoresis, fever and unexpected weight change.   HENT:  Negative for drooling, ear discharge, facial swelling, nosebleeds, sore throat, trouble swallowing, voice change and goiter.    Eyes:  Negative for photophobia, pain, discharge, redness and visual disturbance.   Respiratory:  Negative for apnea, cough, choking, chest tightness, shortness of breath, wheezing and stridor.    Cardiovascular:  Negative for chest pain, palpitations and leg swelling.   Gastrointestinal:  Negative for abdominal distention, anorexia, diarrhea, rectal pain, vomiting and fecal incontinence.   Endocrine: Negative for cold intolerance, heat intolerance, polydipsia, polyphagia and polyuria.   Genitourinary:  Negative for flank pain, frequency and hot flashes.   Musculoskeletal:  Positive for arthralgias, back pain, neck pain and neck stiffness.   Integumentary:  Negative for color change and pallor.   Allergic/Immunologic: Negative for immunocompromised state.   Neurological:  Negative for dizziness, vertigo, seizures, syncope, facial asymmetry, speech difficulty, light-headedness, memory loss and coordination difficulties.   Hematological:  Negative for adenopathy. Does not bruise/bleed easily.   Psychiatric/Behavioral:  Negative for agitation, behavioral problems, confusion, decreased concentration, dysphoric mood, hallucinations, self-injury and suicidal ideas. The patient is not nervous/anxious and is  not hyperactive.            Past Medical History:   Diagnosis Date    Allergy     Anxiety     Congenital hypothyroidism     Depressive disorder     GERD (gastroesophageal reflux disease)     Hyperlipidemia     Hypertension     Insomnia     Irritable bowel syndrome     Lumbosacral spondylosis     NSTEMI (non-ST elevated myocardial infarction) 05/09/2022    Primary fibromyalgia syndrome     Seizures     Sleep apnea     Type 2 diabetes mellitus      Past Surgical History:   Procedure Laterality Date    ADENOIDECTOMY      ANGIOGRAM, CORONARY, WITH LEFT HEART CATHETERIZATION N/A 05/09/2022    Procedure: Angiogram, Coronary, with Left Heart Cath;  Surgeon: Arthur Dewey MD;  Location: [facility] CATH LAB;  Service: Cardiology;  Laterality: N/A;    ELECTROCARDIOGRAPHY      EPIDURAL STEROID INJECTION  09/02/2020    C5-6 FABIANO - Dr Venegas    ESOPHAGOGASTRODUODENOSCOPY      HYSTERECTOMY      INJECTION OF FACET JOINT Left 12/27/2018    Left C6-7 Facet Injection - Deb    INJECTION OF FACET JOINT Right 05/19/2019    Right C4-7 Facet Injection X 2 -Deb    INJECTION OF FACET JOINT Left 02/27/2019    Left C4-7 Facet Injection -Deb    knee bilateral      LEFT HEART CATHETERIZATION N/A 09/15/2022    Procedure: Left heart cath;  Surgeon: Arthur Dewey MD;  Location: [facility] CATH LAB;  Service: Cardiology;  Laterality: N/A;    RADIOFREQUENCY THERMOCOAGULATION Right 05/21/2019    Right C4-7 RFTC - Deb    RADIOFREQUENCY THERMOCOAGULATION Left 04/23/2019    Left C4-7 RFTC -Deb    shoulder bilateral      TONSILLECTOMY       Social History     Socioeconomic History    Marital status:    Tobacco Use    Smoking status: Former     Current packs/day: 1.00     Average packs/day: 1 pack/day for 35.0 years (35.0 ttl pk-yrs)     Types: Cigarettes    Smokeless tobacco: Never   Substance and Sexual Activity    Alcohol use: Not Currently    Drug use: Not Currently     Types: Marijuana     Social Determinants of Health     Food  Insecurity: Food Insecurity Present (3/25/2024)    [facility]    Hunger Vital Sign     Worried About Running Out of Food in the Last Year: Sometimes true     Ran Out of Food in the Last Year: Never true   Transportation Needs: No Transportation Needs (3/25/2024)    [facility]    PRAPARE - Transportation     Lack of Transportation (Medical): No     Lack of Transportation (Non-Medical): No   Housing Stability: Low Risk  (3/25/2024)    [facility], [facility]    Housing Stability Vital Sign     Unable to Pay for Housing in the Last Year: No     Number of Places Lived in the Last Year: 1     In the last 12 months, was there a time when you did not have a steady place to sleep or slept in a shelter (including now)?: No     Family History   Problem Relation Name Age of Onset    Stroke Mother      Diabetes Mother      Heart disease Mother      Hypertension Mother      Hypertension Father      Heart disease Father      Stroke Father      Lupus Father      Cancer Brother      Diabetes Brother      Hypertension Brother       Review of patient's allergies indicates:   Allergen Reactions    Cinnamon analogues Anaphylaxis    Iodine Anaphylaxis    Morpholine analogues Shortness Of Breath    Shellfish containing products Anaphylaxis    Adhesive Other (See Comments)     Heart monitor gel on leads caused chemical burn     Morphine Other (See Comments)    Pamelor [nortriptyline]         Objective:  Vitals:    06/11/24 1321   BP:  "109/70   Pulse: 75   Resp: 18   Weight: 66.7 kg (147 lb)   Height: 5' 2" (1.575 m)   PainSc:   6           Physical Exam  Vitals and nursing note reviewed. Exam conducted with a chaperone present.   Constitutional:       General: She is awake. She is not in acute distress.     Appearance: Normal appearance. She is not ill-appearing, toxic-appearing or diaphoretic.   HENT:      Head: Normocephalic and atraumatic.      Nose: Nose normal.      Mouth/Throat:      Mouth: Mucous membranes are moist.      Pharynx: Oropharynx is clear.   Eyes:      Conjunctiva/sclera: Conjunctivae normal.      Pupils: Pupils are equal, round, and reactive to light.   Cardiovascular:      Rate and Rhythm: Normal rate.   Pulmonary:      Effort: Pulmonary effort is normal. No respiratory distress.   Abdominal:      Palpations: Abdomen is soft.   Musculoskeletal:         General: Normal range of motion.      Right shoulder: Tenderness present.      Left shoulder: Tenderness present.      Right wrist: Tenderness present.      Left wrist: Tenderness present.      Cervical back: Normal range of motion and neck supple. Tenderness present.      Thoracic back: Tenderness present.      Lumbar back: Tenderness present.   Skin:     General: Skin is warm and dry.   Neurological:      General: No focal deficit present.      Mental Status: She is alert and oriented to person, place, and time. Mental status is at baseline.      Cranial Nerves: No cranial nerve deficit (II-XII).   Psychiatric:         Mood and Affect: Mood normal.         Behavior: Behavior normal. Behavior is cooperative.         Thought Content: Thought content normal.           US Abdomen Complete  Narrative: EXAMINATION:  US ABDOMEN COMPLETE    CLINICAL HISTORY:  Abdominal Pain    TECHNIQUE:  Grayscale and color Doppler imaging performed    COMPARISON:  None available    FINDINGS:  The liver is normal in size and echogenicity. The gallbladder is fluid-filled without evidence of stones or " sludge. The gallbladder wall thickness is 2.0 mm. The common bile duct measures 2.0 mm.    The visualized portion of the pancreas appear within normal limits    The kidneys are normal in size and echogenicity without hydronephrosis or other abnormality. The right renal length is 11.3 cm. Left renal length is 11.1 cm.    Spleen, aorta and IVC appear within normal limits. No free fluid or free air seen.  Impression: No evidence of abnormality demonstrated.    Ultrasound images stored and captured.    Electronically signed by: Eren James  Date:    02/15/2023  Time:    09:16         Office Visit on 04/11/2024   Component Date Value Ref Range Status    POC Amphetamines 04/11/2024 Negative  Negative, Inconclusive Final    POC Barbiturates 04/11/2024 Negative  Negative, Inconclusive Final    POC Benzodiazepines 04/11/2024 Negative  Negative, Inconclusive Final    POC Cocaine 04/11/2024 Negative  Negative, Inconclusive Final    POC THC 04/11/2024 Negative  Negative, Inconclusive Final    POC Methadone 04/11/2024 Negative  Negative, Inconclusive Final    POC Methamphetamine 04/11/2024 Negative  Negative, Inconclusive Final    POC Opiates 04/11/2024 Presumptive Positive (A)  Negative, Inconclusive Final    POC Oxycodone 04/11/2024 Negative  Negative, Inconclusive Final    POC Phencyclidine 04/11/2024 Negative  Negative, Inconclusive Final    POC Methylenedioxymethamphetamine * 04/11/2024 Negative  Negative, Inconclusive Final    POC Tricyclic Antidepressants 04/11/2024 Negative  Negative, Inconclusive Final    POC Buprenorphine 04/11/2024 Negative   Final     Acceptable 04/11/2024 Yes   Final    POC Temperature (Urine) 04/11/2024 90   Final   Lab Visit on 02/01/2024   Component Date Value Ref Range Status    Sodium 02/01/2024 138  136 - 145 mmol/L Final    Potassium 02/01/2024 3.9  3.5 - 5.1 mmol/L Final    Chloride 02/01/2024 109 (H)  98 - 107 mmol/L Final    CO2 02/01/2024 26  21 - 32 mmol/L Final     Anion Gap 02/01/2024 7  7 - 16 mmol/L Final    Glucose 02/01/2024 149 (H)  74 - 106 mg/dL Final    BUN 02/01/2024 17  7 - 18 mg/dL Final    Creatinine 02/01/2024 1.28 (H)  0.55 - 1.02 mg/dL Final    BUN/Creatinine Ratio 02/01/2024 13  6 - 20 Final    Calcium 02/01/2024 9.2  8.5 - 10.1 mg/dL Final    Total Protein 02/01/2024 7.3  6.4 - 8.2 g/dL Final    Albumin 02/01/2024 3.7  3.5 - 5.0 g/dL Final    Globulin 02/01/2024 3.6  2.0 - 4.0 g/dL Final    A/G Ratio 02/01/2024 1.0   Final    Bilirubin, Total 02/01/2024 0.4  >0.0 - 1.2 mg/dL Final    Alk Phos 02/01/2024 44  39 - 100 U/L Final    ALT 02/01/2024 20  13 - 56 U/L Final    AST 02/01/2024 10 (L)  15 - 37 U/L Final    eGFR 02/01/2024 53 (L)  >=60 mL/min/1.73m2 Final    WBC 02/01/2024 9.97  4.50 - 11.00 K/uL Final    RBC 02/01/2024 3.47 (L)  4.20 - 5.40 M/uL Final    Hemoglobin 02/01/2024 12.5  12.0 - 16.0 g/dL Final    Hematocrit 02/01/2024 37.5 (L)  38.0 - 47.0 % Final    MCV 02/01/2024 108.1 (H)  80.0 - 96.0 fL Final    MCH 02/01/2024 36.0 (H)  27.0 - 31.0 pg Final    MCHC 02/01/2024 33.3  32.0 - 36.0 g/dL Final    RDW 02/01/2024 15.6 (H)  11.5 - 14.5 % Final    Platelet Count 02/01/2024 349  150 - 400 K/uL Final    MPV 02/01/2024 9.6  9.4 - 12.4 fL Final    Neutrophils % 02/01/2024 57.2  53.0 - 65.0 % Final    Lymphocytes % 02/01/2024 30.3  27.0 - 41.0 % Final    Monocytes % 02/01/2024 8.3 (H)  2.0 - 6.0 % Final    Eosinophils % 02/01/2024 3.1  1.0 - 4.0 % Final    Basophils % 02/01/2024 0.7  0.0 - 1.0 % Final    Immature Granulocytes % 02/01/2024 0.4  0.0 - 0.4 % Final    nRBC, Auto 02/01/2024 0.0  <=0.0 % Final    Neutrophils, Abs 02/01/2024 5.70  1.80 - 7.70 K/uL Final    Lymphocytes, Absolute 02/01/2024 3.02  1.00 - 4.80 K/uL Final    Monocytes, Absolute 02/01/2024 0.83 (H)  0.00 - 0.80 K/uL Final    Eosinophils, Absolute 02/01/2024 0.31  0.00 - 0.50 K/uL Final    Basophils, Absolute 02/01/2024 0.07  0.00 - 0.20 K/uL Final    Immature Granulocytes, Absolute  02/01/2024 0.04  0.00 - 0.04 K/uL Final    nRBC, Absolute 02/01/2024 0.00  <=0.00 x10e3/uL Final    Diff Type 02/01/2024 Scan Smear   Final    Platelet Morphology 02/01/2024 Few Large Platelets (A)  Normal Final    Macrocytosis 02/01/2024 1+   Final         Orders Placed This Encounter   Procedures    Ambulatory referral/consult to Rheumatology     Standing Status:   Future     Standing Expiration Date:   7/11/2025     Referral Priority:   Routine     Referral Type:   Consultation     Referral Reason:   Specialty Services Required     Referred to Provider:   Mee Sotelo NP     Requested Specialty:   Rheumatology     Number of Visits Requested:   1       Requested Prescriptions     Signed Prescriptions Disp Refills    gabapentin (NEURONTIN) 300 MG capsule 90 capsule 1     Sig: Take 1 capsule (300 mg total) by mouth every 8 (eight) hours.    HYDROcodone-acetaminophen (NORCO)  mg per tablet 90 tablet 0     Sig: Take 1 tablet by mouth every 8 (eight) hours as needed for Pain.    HYDROcodone-acetaminophen (NORCO)  mg per tablet 90 tablet 0     Sig: Take 1 tablet by mouth every 8 (eight) hours as needed for Pain.       Assessment:     1. Ankylosing spondylitis of multiple sites in spine    2. Rheumatoid arthritis involving multiple sites with positive rheumatoid factor           A's of Opioid Risk Assessment  Activity:Patient can perform ADL.   Analgesia:Patients pain is partially controlled by current medication. Patient has tried OTC medications such as Tylenol and Ibuprofen with out relief.   Adverse Effects: Patient denies constipation or sedation.  Aberrant Behavior:  reviewed with no aberrant drug seeking/taking behavior.  Overdose reversal drug naloxone discussed    Drug screen reviewed      Plan:    Narcan January 2023    Follows rheumatology [facility] rheumatoid arthritis, ankylosing spondylitis    Requesting referral [facility] rheumatology     Cardiac bypass surgery [facility] postponed  due to small-vessel disease, limited options for bypass     Anticoagulated after multiple myocardial infarction    Cardiology has informed her she can not have:  Scope/procedures until further notice due to poor cardiac function      Chronic joint back pain related to underlying condition     Current medications control her discomfort complaint    Continue current medication    Will continue home exercise program as directed     Follow-up 2 months      Dr. Venegas, November 2024     Bring original prescription medication bottles/container/box with labels to each visit

## 2024-06-11 ENCOUNTER — OFFICE VISIT (OUTPATIENT)
Dept: PAIN MEDICINE | Facility: CLINIC | Age: 44
End: 2024-06-11
Payer: MEDICARE

## 2024-06-11 VITALS
RESPIRATION RATE: 18 BRPM | WEIGHT: 147 LBS | HEIGHT: 62 IN | SYSTOLIC BLOOD PRESSURE: 109 MMHG | DIASTOLIC BLOOD PRESSURE: 70 MMHG | BODY MASS INDEX: 27.05 KG/M2 | HEART RATE: 75 BPM

## 2024-06-11 DIAGNOSIS — M05.79 RHEUMATOID ARTHRITIS INVOLVING MULTIPLE SITES WITH POSITIVE RHEUMATOID FACTOR: Chronic | ICD-10-CM

## 2024-06-11 DIAGNOSIS — M45.0 ANKYLOSING SPONDYLITIS OF MULTIPLE SITES IN SPINE: Primary | Chronic | ICD-10-CM

## 2024-06-11 PROCEDURE — 99214 OFFICE O/P EST MOD 30 MIN: CPT | Mod: S$PBB,,, | Performed by: PHYSICIAN ASSISTANT

## 2024-06-11 PROCEDURE — 99215 OFFICE O/P EST HI 40 MIN: CPT | Mod: PBBFAC | Performed by: PHYSICIAN ASSISTANT

## 2024-06-11 RX ORDER — HYDROCODONE BITARTRATE AND ACETAMINOPHEN 10; 325 MG/1; MG/1
1 TABLET ORAL EVERY 8 HOURS PRN
Qty: 90 TABLET | Refills: 0 | Status: SHIPPED | OUTPATIENT
Start: 2024-07-20

## 2024-06-11 RX ORDER — GABAPENTIN 300 MG/1
300 CAPSULE ORAL EVERY 8 HOURS
Qty: 90 CAPSULE | Refills: 1 | Status: SHIPPED | OUTPATIENT
Start: 2024-06-11

## 2024-06-11 RX ORDER — HYDROCODONE BITARTRATE AND ACETAMINOPHEN 10; 325 MG/1; MG/1
1 TABLET ORAL EVERY 8 HOURS PRN
Qty: 90 TABLET | Refills: 0 | Status: SHIPPED | OUTPATIENT
Start: 2024-06-20

## 2024-07-22 DIAGNOSIS — M05.79 RHEUMATOID ARTHRITIS INVOLVING MULTIPLE SITES WITH POSITIVE RHEUMATOID FACTOR: Chronic | ICD-10-CM

## 2024-07-22 DIAGNOSIS — M45.0 ANKYLOSING SPONDYLITIS OF MULTIPLE SITES IN SPINE: ICD-10-CM

## 2024-07-22 RX ORDER — ADALIMUMAB 40MG/0.4ML
40 KIT SUBCUTANEOUS
Qty: 2 PEN | Refills: 2 | OUTPATIENT
Start: 2024-07-22 | End: 2024-10-20

## 2024-07-22 RX ORDER — ADALIMUMAB 40MG/0.4ML
40 KIT SUBCUTANEOUS
Qty: 2 PEN | Refills: 2 | Status: CANCELLED | OUTPATIENT
Start: 2024-07-22 | End: 2024-10-20

## 2024-08-12 NOTE — PROGRESS NOTES
Subjective:         Patient ID: Cherry Smiley is a 44 y.o. female.    Chief Complaint: Back Pain, Joint Pain, Arm Pain (Right ), and Hand Pain (Right )        Pain  This is a chronic problem. The current episode started more than 1 year ago. The problem occurs daily. The problem has been waxing and waning. Associated symptoms include arthralgias and neck pain. Pertinent negatives include no anorexia, chest pain, chills, coughing, diaphoresis, fever, sore throat, vertigo or vomiting.     Review of Systems   Constitutional:  Negative for activity change, appetite change, chills, diaphoresis, fever and unexpected weight change.   HENT:  Negative for drooling, ear discharge, facial swelling, nosebleeds, sore throat, trouble swallowing, voice change and goiter.    Eyes:  Negative for photophobia, pain, discharge, redness and visual disturbance.   Respiratory:  Negative for apnea, cough, choking, chest tightness, shortness of breath, wheezing and stridor.    Cardiovascular:  Negative for chest pain, palpitations and leg swelling.   Gastrointestinal:  Negative for abdominal distention, anorexia, diarrhea, rectal pain, vomiting and fecal incontinence.   Endocrine: Negative for cold intolerance, heat intolerance, polydipsia, polyphagia and polyuria.   Genitourinary:  Negative for flank pain, frequency and hot flashes.   Musculoskeletal:  Positive for arthralgias, back pain, neck pain and neck stiffness.   Integumentary:  Negative for color change and pallor.   Allergic/Immunologic: Negative for immunocompromised state.   Neurological:  Negative for dizziness, vertigo, seizures, syncope, facial asymmetry, speech difficulty, light-headedness, memory loss and coordination difficulties.   Hematological:  Negative for adenopathy. Does not bruise/bleed easily.   Psychiatric/Behavioral:  Negative for agitation, behavioral problems, confusion, decreased concentration, dysphoric mood, hallucinations, self-injury and suicidal  ideas. The patient is not nervous/anxious and is not hyperactive.            Past Medical History:   Diagnosis Date    Allergy     Anxiety     Congenital hypothyroidism     Depressive disorder     GERD (gastroesophageal reflux disease)     Hyperlipidemia     Hypertension     Insomnia     Irritable bowel syndrome     Lumbosacral spondylosis     NSTEMI (non-ST elevated myocardial infarction) 05/09/2022    Primary fibromyalgia syndrome     Seizures     Sleep apnea     Type 2 diabetes mellitus      Past Surgical History:   Procedure Laterality Date    ADENOIDECTOMY      ANGIOGRAM, CORONARY, WITH LEFT HEART CATHETERIZATION N/A 05/09/2022    Procedure: Angiogram, Coronary, with Left Heart Cath;  Surgeon: Arthur Dewey MD;  Location: [facility] CATH LAB;  Service: Cardiology;  Laterality: N/A;    ELECTROCARDIOGRAPHY      EPIDURAL STEROID INJECTION  09/02/2020    C5-6 FABIANO - Dr Venegas    ESOPHAGOGASTRODUODENOSCOPY      HYSTERECTOMY      INJECTION OF FACET JOINT Left 12/27/2018    Left C6-7 Facet Injection - Deb    INJECTION OF FACET JOINT Right 05/19/2019    Right C4-7 Facet Injection X 2 -Deb    INJECTION OF FACET JOINT Left 02/27/2019    Left C4-7 Facet Injection -Deb    knee bilateral      LEFT HEART CATHETERIZATION N/A 09/15/2022    Procedure: Left heart cath;  Surgeon: Arthur Dewey MD;  Location: [facility] CATH LAB;  Service: Cardiology;  Laterality: N/A;    RADIOFREQUENCY THERMOCOAGULATION Right 05/21/2019    Right C4-7 RFTC - Deb    RADIOFREQUENCY THERMOCOAGULATION Left 04/23/2019    Left C4-7 RFTC -Deb    shoulder bilateral      TONSILLECTOMY       Social History     Socioeconomic History    Marital status:    Tobacco Use    Smoking status: Former     Current packs/day: 1.00     Average packs/day: 1 pack/day for 35.0 years (35.0 ttl pk-yrs)     Types: Cigarettes    Smokeless tobacco: Never   Substance and Sexual Activity    Alcohol use: Not Currently    Drug use: Not Currently     Types: Marijuana      Social Determinants of Health     Food Insecurity: Food Insecurity Present (3/25/2024)    [facility], [facility]    Hunger Vital Sign     Worried About Running Out of Food in the Last Year: Sometimes true     Ran Out of Food in the Last Year: Never true   Transportation Needs: No Transportation Needs (3/25/2024)    [facility], [facility]    PRAPARE - Transportation     Lack of Transportation (Medical): No     Lack of Transportation (Non-Medical): No   Housing Stability: Low Risk  (3/25/2024)    [facility], [facility]    Housing Stability Vital Sign     Unable to Pay for Housing in the Last Year: No     Number of Places Lived in the Last Year: 1     Unstable Housing in the Last Year: No     Family History   Problem Relation Name Age of Onset    Stroke Mother      Diabetes Mother      Heart disease Mother      Hypertension Mother      Hypertension Father      Heart disease Father      Stroke Father      Lupus Father      Cancer Brother      Diabetes Brother      Hypertension Brother       Review of patient's allergies indicates:   Allergen Reactions    Cinnamon analogues Anaphylaxis    Iodine Anaphylaxis    Morpholine analogues Shortness Of Breath    Shellfish containing products Anaphylaxis    Adhesive Other (See Comments)     Heart monitor gel on leads caused chemical burn     Morphine Other (See Comments)    Pamelor [nortriptyline]         Objective:  Vitals:    08/20/24 0921   BP: (!) 106/50   Pulse: 69   Resp: 16   Weight: 68  "kg (150 lb)   Height: 5' 2" (1.575 m)   PainSc:   8             Physical Exam  Vitals and nursing note reviewed. Exam conducted with a chaperone present.   Constitutional:       General: She is awake. She is not in acute distress.     Appearance: Normal appearance. She is not ill-appearing, toxic-appearing or diaphoretic.   HENT:      Head: Normocephalic and atraumatic.      Nose: Nose normal.      Mouth/Throat:      Mouth: Mucous membranes are moist.      Pharynx: Oropharynx is clear.   Eyes:      Conjunctiva/sclera: Conjunctivae normal.      Pupils: Pupils are equal, round, and reactive to light.   Cardiovascular:      Rate and Rhythm: Normal rate.   Pulmonary:      Effort: Pulmonary effort is normal. No respiratory distress.   Abdominal:      Palpations: Abdomen is soft.   Musculoskeletal:         General: Normal range of motion.      Right shoulder: Tenderness present.      Left shoulder: Tenderness present.      Right wrist: Tenderness present.      Left wrist: Tenderness present.      Cervical back: Normal range of motion and neck supple. Tenderness present.      Thoracic back: Tenderness present.      Lumbar back: Tenderness present.   Skin:     General: Skin is warm and dry.   Neurological:      General: No focal deficit present.      Mental Status: She is alert and oriented to person, place, and time. Mental status is at baseline.      Cranial Nerves: No cranial nerve deficit (II-XII).   Psychiatric:         Mood and Affect: Mood normal.         Behavior: Behavior normal. Behavior is cooperative.         Thought Content: Thought content normal.           US Abdomen Complete  Narrative: EXAMINATION:  US ABDOMEN COMPLETE    CLINICAL HISTORY:  Abdominal Pain    TECHNIQUE:  Grayscale and color Doppler imaging performed    COMPARISON:  None available    FINDINGS:  The liver is normal in size and echogenicity. The gallbladder is fluid-filled without evidence of stones or sludge. The gallbladder wall thickness is " 2.0 mm. The common bile duct measures 2.0 mm.    The visualized portion of the pancreas appear within normal limits    The kidneys are normal in size and echogenicity without hydronephrosis or other abnormality. The right renal length is 11.3 cm. Left renal length is 11.1 cm.    Spleen, aorta and IVC appear within normal limits. No free fluid or free air seen.  Impression: No evidence of abnormality demonstrated.    Ultrasound images stored and captured.    Electronically signed by: Eren James  Date:    02/15/2023  Time:    09:16         Office Visit on 04/11/2024   Component Date Value Ref Range Status    POC Amphetamines 04/11/2024 Negative  Negative, Inconclusive Final    POC Barbiturates 04/11/2024 Negative  Negative, Inconclusive Final    POC Benzodiazepines 04/11/2024 Negative  Negative, Inconclusive Final    POC Cocaine 04/11/2024 Negative  Negative, Inconclusive Final    POC THC 04/11/2024 Negative  Negative, Inconclusive Final    POC Methadone 04/11/2024 Negative  Negative, Inconclusive Final    POC Methamphetamine 04/11/2024 Negative  Negative, Inconclusive Final    POC Opiates 04/11/2024 Presumptive Positive (A)  Negative, Inconclusive Final    POC Oxycodone 04/11/2024 Negative  Negative, Inconclusive Final    POC Phencyclidine 04/11/2024 Negative  Negative, Inconclusive Final    POC Methylenedioxymethamphetamine * 04/11/2024 Negative  Negative, Inconclusive Final    POC Tricyclic Antidepressants 04/11/2024 Negative  Negative, Inconclusive Final    POC Buprenorphine 04/11/2024 Negative   Final     Acceptable 04/11/2024 Yes   Final    POC Temperature (Urine) 04/11/2024 90   Final         Orders Placed This Encounter   Procedures    MRI Previous     Standing Status:   Future     Number of Occurrences:   1     Standing Expiration Date:   11/20/2024    POCT Urine Drug Screen Presump     Interpretive Information:     Negative:  No drug detected at the cut off level.   Positive:  This result  represents presumptive positive for the   tested drug, other substances may yield a positive response other   than the analyte of interest. This result should be utilized for   diagnostic purpose only. Confirmation testing will be performed upon physician request only.          Requested Prescriptions     Signed Prescriptions Disp Refills    gabapentin (NEURONTIN) 300 MG capsule 90 capsule 1     Sig: Take 1 capsule (300 mg total) by mouth every 8 (eight) hours.    HYDROcodone-acetaminophen (NORCO)  mg per tablet 90 tablet 0     Sig: Take 1 tablet by mouth every 8 (eight) hours as needed for Pain.    HYDROcodone-acetaminophen (NORCO)  mg per tablet 90 tablet 0     Sig: Take 1 tablet by mouth every 8 (eight) hours as needed for Pain.       Assessment:     1. Cervical radiculopathy    2. Ankylosing spondylitis of multiple sites in spine    3. Rheumatoid arthritis involving multiple sites with positive rheumatoid factor    4. Encounter for long-term (current) use of other medications             A's of Opioid Risk Assessment  Activity:Patient can perform ADL.   Analgesia:Patients pain is partially controlled by current medication. Patient has tried OTC medications such as Tylenol and Ibuprofen with out relief.   Adverse Effects: Patient denies constipation or sedation.  Aberrant Behavior:  reviewed with no aberrant drug seeking/taking behavior.  Overdose reversal drug naloxone discussed    Drug screen reviewed      Plan:    Narcan January 2023    Follows rheumatology, rheumatoid arthritis, ankylosing spondylitis    Cardiac bypass surgery [facility] postponed due to small-vessel disease, limited options for bypass     Anticoagulated after multiple myocardial infarction    Cardiology has informed her she can not have:  Scope/procedures until further notice due to poor cardiac function      Cervical radiculopathy symptoms continues chronic joint back pain     MRI cervical spine [facility] July 24,  2024  C3-C4: Diffuse mild-to-moderate disc bulge without focal neural   impingement   C4-C5: Mild disc bulge with asymmetric focal protrusion of disc and   osteophyte complex into the left lateral recess and foraminal region   with possible encroachment on the left C5 nerve root.   C5-C6: Diffuse prominent disc and osteophyte bulge. This leads to   central canal narrowing and bilateral lateral recess and foraminal   narrowing. AP canal diameter is 8 mm.   C6-C7: Mild to moderate diffuse disc and osteophyte bulge. Borderline   canal narrowing. Mild foraminal narrowing bilaterally.     Will request MRI center load images on our system    She has been referred to neuro [facility]    If she decides have cervical epidural steroid injection she will contact our office    Current medications control her discomfort complaint    Continue current medication    Will continue home exercise program as directed     Follow-up 2 months      Dr. Venegas, November 2024     Bring original prescription medication bottles/container/box with labels to each visit

## 2024-08-20 ENCOUNTER — OFFICE VISIT (OUTPATIENT)
Dept: PAIN MEDICINE | Facility: CLINIC | Age: 44
End: 2024-08-20
Payer: MEDICARE

## 2024-08-20 VITALS
RESPIRATION RATE: 16 BRPM | SYSTOLIC BLOOD PRESSURE: 106 MMHG | DIASTOLIC BLOOD PRESSURE: 50 MMHG | HEART RATE: 69 BPM | HEIGHT: 62 IN | BODY MASS INDEX: 27.6 KG/M2 | WEIGHT: 150 LBS

## 2024-08-20 DIAGNOSIS — M05.79 RHEUMATOID ARTHRITIS INVOLVING MULTIPLE SITES WITH POSITIVE RHEUMATOID FACTOR: Chronic | ICD-10-CM

## 2024-08-20 DIAGNOSIS — M45.0 ANKYLOSING SPONDYLITIS OF MULTIPLE SITES IN SPINE: Chronic | ICD-10-CM

## 2024-08-20 DIAGNOSIS — Z79.899 ENCOUNTER FOR LONG-TERM (CURRENT) USE OF OTHER MEDICATIONS: ICD-10-CM

## 2024-08-20 DIAGNOSIS — M54.12 CERVICAL RADICULOPATHY: Primary | ICD-10-CM

## 2024-08-20 PROCEDURE — 99999PBSHW POCT URINE DRUG SCREEN PRESUMP: Mod: PBBFAC,,,

## 2024-08-20 PROCEDURE — 99999 PR PBB SHADOW E&M-EST. PATIENT-LVL V: CPT | Mod: PBBFAC,,, | Performed by: PHYSICIAN ASSISTANT

## 2024-08-20 PROCEDURE — 80305 DRUG TEST PRSMV DIR OPT OBS: CPT | Mod: PBBFAC | Performed by: PHYSICIAN ASSISTANT

## 2024-08-20 PROCEDURE — 99214 OFFICE O/P EST MOD 30 MIN: CPT | Mod: S$PBB,,, | Performed by: PHYSICIAN ASSISTANT

## 2024-08-20 PROCEDURE — 99215 OFFICE O/P EST HI 40 MIN: CPT | Mod: PBBFAC | Performed by: PHYSICIAN ASSISTANT

## 2024-08-20 RX ORDER — ONDANSETRON HYDROCHLORIDE 4 MG/5ML
8 SOLUTION ORAL 2 TIMES DAILY
COMMUNITY

## 2024-08-20 RX ORDER — ZAVEGEPANT 10 MG/.1ML
SPRAY NASAL
COMMUNITY

## 2024-08-20 RX ORDER — ISOSORBIDE DINITRATE 30 MG/1
20 TABLET ORAL 3 TIMES DAILY
COMMUNITY

## 2024-08-20 RX ORDER — HYDROCODONE BITARTRATE AND ACETAMINOPHEN 10; 325 MG/1; MG/1
1 TABLET ORAL EVERY 8 HOURS PRN
Qty: 90 TABLET | Refills: 0 | Status: SHIPPED | OUTPATIENT
Start: 2024-09-19

## 2024-08-20 RX ORDER — HYDROCODONE BITARTRATE AND ACETAMINOPHEN 10; 325 MG/1; MG/1
1 TABLET ORAL EVERY 8 HOURS PRN
Qty: 90 TABLET | Refills: 0 | Status: SHIPPED | OUTPATIENT
Start: 2024-08-20

## 2024-08-20 RX ORDER — GABAPENTIN 300 MG/1
300 CAPSULE ORAL EVERY 8 HOURS
Qty: 90 CAPSULE | Refills: 1 | Status: SHIPPED | OUTPATIENT
Start: 2024-08-20

## 2024-08-20 RX ORDER — ZONISAMIDE 100 MG/1
CAPSULE ORAL
COMMUNITY

## 2024-09-09 ENCOUNTER — TELEPHONE (OUTPATIENT)
Dept: SURGERY | Facility: CLINIC | Age: 44
End: 2024-09-09
Payer: MEDICARE

## 2024-09-09 NOTE — TELEPHONE ENCOUNTER
----- Message from Lubna Venegason sent at 9/9/2024 10:31 AM CDT -----  Pt has an abscess on buttock. Very painful. She wants to come in on Thurs due to pts  can't get off work til then. Size of a golf ball. Knot under skin. She has seen him before for this type of thing. Please call pt at [phone].  Who Called: Cherry Smiley    Caller is requesting assistance/information from provider's office.    Symptoms (please be specific): abscess           Patient's Preferred Phone Number on File: [phone]   Best Call Back Number, if different:  Additional Information:

## 2024-09-09 NOTE — TELEPHONE ENCOUNTER
Returned pt phone call and informed her to come to clinic asap. Pt stated she couldn't come d/t her  being at work. Appt scheduled. Informed pt to come in sooner if possible. Pt denies further questions at this time.

## 2024-09-10 ENCOUNTER — OFFICE VISIT (OUTPATIENT)
Dept: SURGERY | Facility: CLINIC | Age: 44
End: 2024-09-10
Attending: SURGERY
Payer: MEDICARE

## 2024-09-10 ENCOUNTER — TELEPHONE (OUTPATIENT)
Dept: SURGERY | Facility: CLINIC | Age: 44
End: 2024-09-10
Payer: MEDICARE

## 2024-09-10 VITALS — HEIGHT: 62 IN | WEIGHT: 143.31 LBS | BODY MASS INDEX: 26.37 KG/M2

## 2024-09-10 DIAGNOSIS — L02.31 LEFT BUTTOCK ABSCESS: Primary | ICD-10-CM

## 2024-09-10 PROCEDURE — 99213 OFFICE O/P EST LOW 20 MIN: CPT | Mod: PBBFAC,25 | Performed by: SURGERY

## 2024-09-10 PROCEDURE — 99999 PR PBB SHADOW E&M-EST. PATIENT-LVL III: CPT | Mod: PBBFAC,,, | Performed by: SURGERY

## 2024-09-10 PROCEDURE — 10061 I&D ABSCESS COMP/MULTIPLE: CPT | Mod: PBBFAC | Performed by: SURGERY

## 2024-09-10 PROCEDURE — 87070 CULTURE OTHR SPECIMN AEROBIC: CPT | Mod: ,,, | Performed by: CLINICAL MEDICAL LABORATORY

## 2024-09-10 PROCEDURE — 99203 OFFICE O/P NEW LOW 30 MIN: CPT | Mod: S$PBB,25,, | Performed by: SURGERY

## 2024-09-10 PROCEDURE — 87186 SC STD MICRODIL/AGAR DIL: CPT | Mod: ,,, | Performed by: CLINICAL MEDICAL LABORATORY

## 2024-09-10 PROCEDURE — 10061 I&D ABSCESS COMP/MULTIPLE: CPT | Mod: S$PBB,,, | Performed by: SURGERY

## 2024-09-10 PROCEDURE — 87075 CULTR BACTERIA EXCEPT BLOOD: CPT | Mod: ,,, | Performed by: CLINICAL MEDICAL LABORATORY

## 2024-09-10 PROCEDURE — 87077 CULTURE AEROBIC IDENTIFY: CPT | Mod: ,,, | Performed by: CLINICAL MEDICAL LABORATORY

## 2024-09-10 NOTE — PROGRESS NOTES
"                                                                                              General Surgery History and Physical      Patient ID: Cherry Smiley is a 44 y.o. female.    Chief Complaint: Follow-up (Follow up for abscess )      HPI:  44-year-old female who saw me about 5 or 6 years ago for some abscesses.  She presents with a few day history of severe left gluteal pain and swelling that got much larger overnight.  Can barely even touch it.  She is a diabetic and she has been having some low-grade chills and discomfort.  Had I&D of very similar place back then.  Was doing well since then.  No acute events or flare-ups.  Her sugars have been a little higher as well.    Review of Systems   Constitutional:  Negative for activity change, appetite change, fatigue and fever.   HENT:  Negative for trouble swallowing.    Respiratory:  Negative for cough and shortness of breath.    Cardiovascular:  Negative for chest pain and palpitations.   Gastrointestinal:  Negative for abdominal distention, abdominal pain, blood in stool, constipation and diarrhea.   Genitourinary:  Negative for flank pain.   Musculoskeletal:  Negative for neck pain and neck stiffness.   Skin:  Positive for color change and wound.   Neurological:  Negative for weakness.       Current Outpatient Medications   Medication Sig Dispense Refill    atorvastatin (LIPITOR) 80 MG tablet Take 1 tablet by mouth in the evening 90 tablet 0    BD INSULIN SYRINGE ULTRA-FINE 0.5 mL 30 gauge x 1/2" Syrg USE SYRINGE THREE TIMES DAILY AS DIRECTED      dapagliflozin-metformin (XIGDUO XR) 5-500 mg TBph Take by mouth.      fenofibrate (TRICOR) 145 MG tablet Take 145 mg by mouth once daily.      folic acid (FOLVITE) 1 MG tablet Take 1 tablet (1 mg total) by mouth once daily. 90 tablet 1    gabapentin (NEURONTIN) 300 MG capsule Take 1 capsule (300 mg total) by mouth every 8 (eight) hours. 90 capsule 1    [START ON 9/19/2024] HYDROcodone-acetaminophen (NORCO) " " mg per tablet Take 1 tablet by mouth every 8 (eight) hours as needed for Pain. 90 tablet 0    HYDROcodone-acetaminophen (NORCO)  mg per tablet Take 1 tablet by mouth every 8 (eight) hours as needed for Pain. 90 tablet 0    insulin NPH-insulin regular, 70/30, (NOVOLIN 70/30) 100 unit/mL (70-30) injection Inject 22 Units into the skin 2 (two) times daily before meals.      isosorbide dinitrate (ISORDIL) 30 MG Tab Take 20 mg by mouth 3 (three) times daily.      magnesium oxide (MAG-OX) 400 mg (241.3 mg magnesium) tablet Take 1 tablet by mouth every evening.      metoprolol succinate (TOPROL-XL) 200 MG 24 hr tablet Take 1 tablet by mouth once daily 90 tablet 0    naloxone (NARCAN) 4 mg/actuation Spry       nitroGLYCERIN (NITROSTAT) 0.4 MG SL tablet Place 0.4 mg under the tongue every 5 (five) minutes as needed. DO NOT EXCEED A TOTAL OF 3 DOSES IN 15 MINUTES      ondansetron (ZOFRAN) 4 mg/5 mL solution Take 8 mg by mouth 2 (two) times daily.      ONETOUCH ULTRA TEST Strp 1 strip 3 (three) times daily. To check blood sugar      prochlorperazine (COMPAZINE) 10 MG tablet       ranolazine (RANEXA) 1,000 mg Tb12       ticagrelor (BRILINTA) 90 mg tablet Take 90 mg by mouth 2 (two) times daily.      zavegepant (ZAVZPRET) 10 mg/actuation Spry by Nasal route.      zonisamide (ZONEGRAN) 100 MG Cap Take by mouth.      adalimumab (HUMIRA,CF, PEN) 40 mg/0.4 mL PnKt Inject 0.4 mLs (40 mg total) into the skin every 14 (fourteen) days. 2 pen 2    aspirin (ECOTRIN) 81 MG EC tablet Take 1 tablet (81 mg total) by mouth once daily.  0    cyanocobalamin (VITAMIN B-12) 2000 MCG tablet Take 1 tablet (2,000 mcg total) by mouth once daily. 30 tablet 3    LEVEMIR FLEXTOUCH U-100 INSULN 100 unit/mL (3 mL) InPn pen INJECT 40 UNITS SUBCUTANEOUSLY ONCE DAILY      methotrexate 2.5 MG Tab Take 6 tablets (15 mg total) by mouth every 7 days. TAKE 6 TABLETS BY MOUTH ONCE A WEEK 72 tablet 0    pen needle, diabetic 32 gauge x 5/32" Ndle USE AS " DIRECTED WITH LEVEMIR PEN      topiramate (TOPAMAX) 25 MG tablet       ubrogepant (UBRELVY) 100 mg tablet Take at onset of migraine, may repeat in 2 hours, max 200 mg in 24 hours       No current facility-administered medications for this visit.       Review of patient's allergies indicates:   Allergen Reactions    Cinnamon analogues Anaphylaxis    Iodine Anaphylaxis    Morpholine analogues Shortness Of Breath    Shellfish containing products Anaphylaxis    Adhesive Other (See Comments)     Heart monitor gel on leads caused chemical burn     Morphine Other (See Comments)    Pamelor [nortriptyline]        Past Medical History:   Diagnosis Date    Allergy     Anxiety     Congenital hypothyroidism     Depressive disorder     GERD (gastroesophageal reflux disease)     Hyperlipidemia     Hypertension     Insomnia     Irritable bowel syndrome     Lumbosacral spondylosis     NSTEMI (non-ST elevated myocardial infarction) 05/09/2022    Primary fibromyalgia syndrome     Seizures     Sleep apnea     Type 2 diabetes mellitus        Past Surgical History:   Procedure Laterality Date    ADENOIDECTOMY      ANGIOGRAM, CORONARY, WITH LEFT HEART CATHETERIZATION N/A 05/09/2022    Procedure: Angiogram, Coronary, with Left Heart Cath;  Surgeon: Arthur Dewey MD;  Location: [facility] CATH LAB;  Service: Cardiology;  Laterality: N/A;    ELECTROCARDIOGRAPHY      EPIDURAL STEROID INJECTION  09/02/2020    C5-6 FABIANO - Dr Venegas    ESOPHAGOGASTRODUODENOSCOPY      HYSTERECTOMY      INJECTION OF FACET JOINT Left 12/27/2018    Left C6-7 Facet Injection - Deb    INJECTION OF FACET JOINT Right 05/19/2019    Right C4-7 Facet Injection X 2 -Deb    INJECTION OF FACET JOINT Left 02/27/2019    Left C4-7 Facet Injection -Deb    knee bilateral      LEFT HEART CATHETERIZATION N/A 09/15/2022    Procedure: Left heart cath;  Surgeon: Arthur Dewey MD;  Location: [facility] CATH LAB;  Service: Cardiology;  Laterality: N/A;    RADIOFREQUENCY  THERMOCOAGULATION Right 05/21/2019    Right C4-7 RFTC - Deb    RADIOFREQUENCY THERMOCOAGULATION Left 04/23/2019    Left C4-7 RFTC -Deb    shoulder bilateral      TONSILLECTOMY         Family History   Problem Relation Name Age of Onset    Stroke Mother      Diabetes Mother      Heart disease Mother      Hypertension Mother      Hypertension Father      Heart disease Father      Stroke Father      Lupus Father      Cancer Brother      Diabetes Brother      Hypertension Brother         Social History     Socioeconomic History    Marital status:    Tobacco Use    Smoking status: Former     Current packs/day: 1.00     Average packs/day: 1 pack/day for 35.0 years (35.0 ttl pk-yrs)     Types: Cigarettes    Smokeless tobacco: Never   Substance and Sexual Activity    Alcohol use: Not Currently    Drug use: Not Currently     Types: Marijuana     Social Determinants of Health     Food Insecurity: Food Insecurity Present (3/25/2024)    [facility], [facility] and Its Sub[facility]idi[facility] and Affiliates    Hunger Vital Sign     Worried About Running Out of Food in the Last Year: Sometimes true     Ran Out of Food in the Last Year: Never true   Transportation Needs: No Transportation Needs (3/25/2024)    Received from [facility] Its [facility]idiaries and Affiliates, [facility] Its Subsidiaries and Affiliates    PRAPARE - Transportation     Lack of Transportation (Medical): No     Lack of Transportation (Non-Medical): No   Housing Stability: Low Risk  (3/25/2024)    [facility], [facility]    Housing Stability Vital Sign     Unable to Pay for Housing in the Last  Year: No     Number of Places Lived in the Last Year: 1     Unstable Housing in the Last Year: No       There were no vitals filed for this visit.    Physical Exam  Constitutional:       General: She is not in acute distress.  HENT:      Head: Normocephalic.   Cardiovascular:      Rate and Rhythm: Normal rate and regular rhythm.      Pulses: Normal pulses.   Pulmonary:      Effort: Pulmonary effort is normal. No respiratory distress.      Breath sounds: Normal breath sounds.   Abdominal:      General: Abdomen is flat. There is no distension.      Palpations: Abdomen is soft.      Tenderness: There is no abdominal tenderness.   Musculoskeletal:         General: Normal range of motion.   Skin:     General: Skin is warm.      Findings: Lesion (Left gluteal area near the intergluteal fold on the cheek is a 3-1/2 x 3 cm indurated fluctuant area) present.   Neurological:      General: No focal deficit present.      Mental Status: She is oriented to person, place, and time.         Assessment & Plan:    Left buttock abscess  -     Culture, Wound  -     Culture, Anaerobe        Preoperative diagnosis: Abscess of left buttock  Postoperative diagnosis: As above  Procedure Performed: Incision and drainage of buttock left    Surgeon: Dr. Jesse Lee  Anesthesia: Local 1% lidocaine with epinephrine  Blood loss: Minimal  Complications: None  Specimen:  Cultures of the left buttock abscess    After verbal time-out site confirmation     Procedure in detail:  After informed consent patient's left buttock was prepped and draped in the usual sterile fashion.  Injected local in and around the area.  We made an incision over the abscess and got into the pocket of purulence.  Cultures were taken.  We then irrigated it out and packed it with gauze The area was dressed and patient tolerated the procedure well.

## 2024-09-10 NOTE — TELEPHONE ENCOUNTER
----- Message from Lynda Newell sent at 9/10/2024  8:16 AM CDT -----  Regarding: today request  Who Called: Cherry Smiley    Caller is requesting a same day appointment. Caller declined first available appointment listed below.      When is the first available appointment?9/12  Options offered (Virtual Visit, Urgent Care): -No  Symptoms or reason for appointment: abscess      Preferred Method of Contact: Phone Call  Patient's Preferred Phone Number on File: [phone]   Best Call Back Number, if different  Additional Information: Pt appt is for 9/12 but she said she can't wait till 9/12

## 2024-09-11 ENCOUNTER — TELEPHONE (OUTPATIENT)
Dept: SURGERY | Facility: CLINIC | Age: 44
End: 2024-09-11
Payer: MEDICARE

## 2024-09-11 RX ORDER — SULFAMETHOXAZOLE AND TRIMETHOPRIM 800; 160 MG/1; MG/1
1 TABLET ORAL 2 TIMES DAILY
Qty: 14 TABLET | Refills: 0 | Status: SHIPPED | OUTPATIENT
Start: 2024-09-11

## 2024-09-11 NOTE — TELEPHONE ENCOUNTER
----- Message from Sheila Venegas sent at 9/11/2024 11:46 AM CDT -----  Regarding: RX PT CALL BACK  PT NEED prescription for  ABSCESS      PT CALL BACK [phone]

## 2024-09-13 LAB — MICROORGANISM SPEC CULT: ABNORMAL

## 2024-09-14 LAB — BACTERIA SPEC ANAEROBE CULT: NORMAL

## 2024-10-28 ENCOUNTER — PATIENT MESSAGE (OUTPATIENT)
Dept: PAIN MEDICINE | Facility: CLINIC | Age: 44
End: 2024-10-28
Payer: MEDICARE

## 2024-10-28 ENCOUNTER — OFFICE VISIT (OUTPATIENT)
Dept: PAIN MEDICINE | Facility: CLINIC | Age: 44
End: 2024-10-28
Payer: MEDICARE

## 2024-10-28 VITALS
HEART RATE: 75 BPM | RESPIRATION RATE: 20 BRPM | HEIGHT: 62 IN | BODY MASS INDEX: 27.79 KG/M2 | DIASTOLIC BLOOD PRESSURE: 67 MMHG | SYSTOLIC BLOOD PRESSURE: 106 MMHG | WEIGHT: 151 LBS

## 2024-10-28 DIAGNOSIS — M45.0 ANKYLOSING SPONDYLITIS OF MULTIPLE SITES IN SPINE: Chronic | ICD-10-CM

## 2024-10-28 DIAGNOSIS — M05.79 RHEUMATOID ARTHRITIS INVOLVING MULTIPLE SITES WITH POSITIVE RHEUMATOID FACTOR: Primary | Chronic | ICD-10-CM

## 2024-10-28 DIAGNOSIS — M54.12 CERVICAL RADICULOPATHY: Chronic | ICD-10-CM

## 2024-10-28 PROCEDURE — 99214 OFFICE O/P EST MOD 30 MIN: CPT | Mod: S$PBB,,, | Performed by: PHYSICIAN ASSISTANT

## 2024-10-28 PROCEDURE — 99213 OFFICE O/P EST LOW 20 MIN: CPT | Mod: PBBFAC | Performed by: PHYSICIAN ASSISTANT

## 2024-10-28 PROCEDURE — 99999 PR PBB SHADOW E&M-EST. PATIENT-LVL III: CPT | Mod: PBBFAC,,, | Performed by: PHYSICIAN ASSISTANT

## 2024-10-28 RX ORDER — GABAPENTIN 300 MG/1
300 CAPSULE ORAL EVERY 8 HOURS
Qty: 90 CAPSULE | Refills: 1 | Status: SHIPPED | OUTPATIENT
Start: 2024-10-28

## 2024-10-28 RX ORDER — HYDROCODONE BITARTRATE AND ACETAMINOPHEN 10; 325 MG/1; MG/1
1 TABLET ORAL EVERY 8 HOURS PRN
Qty: 90 TABLET | Refills: 0 | Status: SHIPPED | OUTPATIENT
Start: 2024-10-28

## 2024-10-28 RX ORDER — HYDROCODONE BITARTRATE AND ACETAMINOPHEN 10; 325 MG/1; MG/1
1 TABLET ORAL EVERY 8 HOURS PRN
Qty: 90 TABLET | Refills: 0 | Status: SHIPPED | OUTPATIENT
Start: 2024-11-28

## 2024-11-14 ENCOUNTER — TELEPHONE (OUTPATIENT)
Dept: PAIN MEDICINE | Facility: CLINIC | Age: 44
End: 2024-11-14
Payer: MEDICARE

## 2024-11-14 ENCOUNTER — PATIENT MESSAGE (OUTPATIENT)
Dept: PAIN MEDICINE | Facility: CLINIC | Age: 44
End: 2024-11-14
Payer: MEDICARE

## 2024-11-14 NOTE — TELEPHONE ENCOUNTER
Patient sent a message via call center about her pacemaker. Informed D. Shows of this information.

## 2024-11-19 ENCOUNTER — PATIENT MESSAGE (OUTPATIENT)
Dept: PAIN MEDICINE | Facility: CLINIC | Age: 44
End: 2024-11-19
Payer: MEDICARE

## 2024-12-11 DIAGNOSIS — M54.12 CERVICAL RADICULOPATHY: Chronic | ICD-10-CM

## 2024-12-11 DIAGNOSIS — M05.79 RHEUMATOID ARTHRITIS INVOLVING MULTIPLE SITES WITH POSITIVE RHEUMATOID FACTOR: Chronic | ICD-10-CM

## 2024-12-11 DIAGNOSIS — M45.0 ANKYLOSING SPONDYLITIS OF MULTIPLE SITES IN SPINE: Chronic | ICD-10-CM

## 2024-12-12 RX ORDER — GABAPENTIN 300 MG/1
300 CAPSULE ORAL EVERY 8 HOURS
Qty: 90 CAPSULE | Refills: 1 | Status: SHIPPED | OUTPATIENT
Start: 2024-12-28

## 2024-12-12 RX ORDER — HYDROCODONE BITARTRATE AND ACETAMINOPHEN 10; 325 MG/1; MG/1
1 TABLET ORAL EVERY 8 HOURS PRN
Qty: 90 TABLET | Refills: 0 | Status: SHIPPED | OUTPATIENT
Start: 2024-12-28

## 2024-12-17 ENCOUNTER — TELEPHONE (OUTPATIENT)
Dept: PAIN MEDICINE | Facility: CLINIC | Age: 44
End: 2024-12-17
Payer: MEDICARE

## 2024-12-17 NOTE — TELEPHONE ENCOUNTER
----- Message from Palmira sent at 12/17/2024 10:01 AM CST -----  Who Called: [employer]   Fax [phone]      States she is wanting to follow up w paperwork that was sent on 12/10 for pt power wheel. Needs to be signed and sent back.     Preferred Method of Contact: Phone Call  Patient's Preferred Phone Number on File: [phone]   Best Call Back Number, if different:  Additional Information:    NEETA SPEARS   12/17/2024   2:29 PM   Spoke with patient

## 2025-01-02 ENCOUNTER — PATIENT MESSAGE (OUTPATIENT)
Dept: PAIN MEDICINE | Facility: CLINIC | Age: 45
End: 2025-01-02
Payer: MEDICARE

## 2025-01-14 DIAGNOSIS — M45.0 ANKYLOSING SPONDYLITIS OF MULTIPLE SITES IN SPINE: Chronic | ICD-10-CM

## 2025-01-14 DIAGNOSIS — M54.12 CERVICAL RADICULOPATHY: Chronic | ICD-10-CM

## 2025-01-14 DIAGNOSIS — M05.79 RHEUMATOID ARTHRITIS INVOLVING MULTIPLE SITES WITH POSITIVE RHEUMATOID FACTOR: Chronic | ICD-10-CM

## 2025-01-14 RX ORDER — HYDROCODONE BITARTRATE AND ACETAMINOPHEN 10; 325 MG/1; MG/1
1 TABLET ORAL EVERY 8 HOURS PRN
Qty: 90 TABLET | Refills: 0 | Status: SHIPPED | OUTPATIENT
Start: 2025-01-15

## 2025-01-21 ENCOUNTER — TELEPHONE (OUTPATIENT)
Dept: PAIN MEDICINE | Facility: CLINIC | Age: 45
End: 2025-01-21
Payer: MEDICARE

## 2025-01-21 NOTE — TELEPHONE ENCOUNTER
Spoke with patient via telephone. Informed patient that the message was emailed by myself to [employer]. We have faxed multiple times to their office. Patient voiced understanding.       ----- Message from Miladis sent at 1/17/2025 10:27 AM CST -----  Regarding: Message  Who Called: Tabby with [employer]    It has been a month since a paper was faxed for patients power wheelchair and there has not been a response. If you can please send response to FAX: [phone]      Preferred Method of Contact: Phone Call     Best Call Back Number, if different: [phone]  Additional Information:

## 2025-01-29 NOTE — PROGRESS NOTES
Subjective:         Patient ID: Cherry Smiley is a 44 y.o. female.    Chief Complaint: Back Pain, Leg Pain, Hip Pain, and Knee Pain        Pain  This is a chronic problem. The current episode started more than 1 year ago. The problem occurs daily. The problem has been unchanged. Associated symptoms include arthralgias and neck pain. Pertinent negatives include no anorexia, chest pain, chills, coughing, diaphoresis, fever, sore throat, vertigo or vomiting.     Review of Systems   Constitutional:  Negative for activity change, appetite change, chills, diaphoresis, fever and unexpected weight change.   HENT:  Negative for drooling, ear discharge, facial swelling, nosebleeds, sore throat, trouble swallowing, voice change and goiter.    Eyes:  Negative for photophobia, pain, discharge, redness and visual disturbance.   Respiratory:  Negative for apnea, cough, choking, chest tightness, shortness of breath, wheezing and stridor.    Cardiovascular:  Negative for chest pain, palpitations and leg swelling.   Gastrointestinal:  Negative for abdominal distention, anorexia, diarrhea, rectal pain, vomiting and fecal incontinence.   Endocrine: Negative for cold intolerance, heat intolerance, polydipsia, polyphagia and polyuria.   Genitourinary:  Negative for flank pain, frequency and hot flashes.   Musculoskeletal:  Positive for arthralgias, back pain, neck pain and neck stiffness.   Integumentary:  Negative for color change and pallor.   Allergic/Immunologic: Negative for immunocompromised state.   Neurological:  Negative for dizziness, vertigo, seizures, syncope, facial asymmetry, speech difficulty, light-headedness, memory loss and coordination difficulties.   Hematological:  Negative for adenopathy. Does not bruise/bleed easily.   Psychiatric/Behavioral:  Negative for agitation, behavioral problems, confusion, decreased concentration, dysphoric mood, hallucinations, self-injury and suicidal ideas. The patient is not  nervous/anxious and is not hyperactive.            Past Medical History:   Diagnosis Date    Allergy     Anxiety     Congenital hypothyroidism     Depressive disorder     GERD (gastroesophageal reflux disease)     Hyperlipidemia     Hypertension     Insomnia     Irritable bowel syndrome     Lumbosacral spondylosis     NSTEMI (non-ST elevated myocardial infarction) 05/09/2022    Primary fibromyalgia syndrome     Seizures     Sleep apnea     Type 2 diabetes mellitus      Past Surgical History:   Procedure Laterality Date    ADENOIDECTOMY      ANGIOGRAM, CORONARY, WITH LEFT HEART CATHETERIZATION N/A 05/09/2022    Procedure: Angiogram, Coronary, with Left Heart Cath;  Surgeon: Arthur Dewey MD;  Location: [facility] CATH LAB;  Service: Cardiology;  Laterality: N/A;    ELECTROCARDIOGRAPHY      EPIDURAL STEROID INJECTION  09/02/2020    C5-6 FABIANO - Dr Venegas    ESOPHAGOGASTRODUODENOSCOPY      HYSTERECTOMY      INJECTION OF FACET JOINT Left 12/27/2018    Left C6-7 Facet Injection - Deb    INJECTION OF FACET JOINT Right 05/19/2019    Right C4-7 Facet Injection X 2 -Deb    INJECTION OF FACET JOINT Left 02/27/2019    Left C4-7 Facet Injection -Deb    knee bilateral      LEFT HEART CATHETERIZATION N/A 09/15/2022    Procedure: Left heart cath;  Surgeon: Arthur Dewey MD;  Location: [facility] CATH LAB;  Service: Cardiology;  Laterality: N/A;    RADIOFREQUENCY THERMOCOAGULATION Right 05/21/2019    Right C4-7 RFTC - Deb    RADIOFREQUENCY THERMOCOAGULATION Left 04/23/2019    Left C4-7 RFTC -Deb    shoulder bilateral      TONSILLECTOMY       Social History     Socioeconomic History    Marital status:    Tobacco Use    Smoking status: Former     Current packs/day: 1.00     Average packs/day: 1 pack/day for 35.0 years (35.0 ttl pk-yrs)     Types: Cigarettes    Smokeless tobacco: Never   Substance and Sexual Activity    Alcohol use: Not Currently    Drug use: Not Currently     Types:  Marijuana     Social Drivers of Health     Food Insecurity: Food Insecurity Present (3/25/2024)    [facility]    Hunger Vital Sign     Worried About Running Out of Food in the Last Year: Sometimes true     Ran Out of Food in the Last Year: Never true   Transportation Needs: No Transportation Needs (3/25/2024)    [facility]    PRAPARE - Transportation     Lack of Transportation (Medical): No     Lack of Transportation (Non-Medical): No   Housing Stability: Low Risk  (3/25/2024)    [facility]    Housing Stability Vital Sign     Unable to Pay for Housing in the Last Year: No     Number of Places Lived in the Last Year: 1     Unstable Housing in the Last Year: No     Family History   Problem Relation Name Age of Onset    Stroke Mother      Diabetes Mother      Heart disease Mother      Hypertension Mother      Hypertension Father      Heart disease Father      Stroke Father      Lupus Father      Cancer Brother      Diabetes Brother      Hypertension Brother       Review of patient's allergies indicates:   Allergen Reactions    Cinnamon analogues Anaphylaxis    Iodine Anaphylaxis    Morpholine analogues Shortness Of Breath    Shellfish containing products Anaphylaxis    Adhesive Other (See Comments)     Heart monitor gel on leads caused chemical burn     Morphine Other (See Comments)    Pamelor [nortriptyline]         Objective:  Vitals:    02/05/25 0950 02/05/25 0951   BP:  "90/65    Pulse: 86    Resp: 16    Weight: 68.9 kg (152 lb)    Height: 5' 2" (1.575 m)    PainSc:   7   7                 Physical Exam  Vitals and nursing note reviewed. Exam conducted with a chaperone present.   Constitutional:       General: She is awake. She is not in acute distress.     Appearance: Normal appearance. She is not ill-appearing, toxic-appearing or diaphoretic.   HENT:      Head: Normocephalic and atraumatic.      Nose: Nose normal.      Mouth/Throat:      Mouth: Mucous membranes are moist.      Pharynx: Oropharynx is clear.   Eyes:      Conjunctiva/sclera: Conjunctivae normal.      Pupils: Pupils are equal, round, and reactive to light.   Cardiovascular:      Rate and Rhythm: Normal rate.   Pulmonary:      Effort: Pulmonary effort is normal. No respiratory distress.   Abdominal:      Palpations: Abdomen is soft.   Musculoskeletal:         General: Normal range of motion.      Right shoulder: Tenderness present.      Left shoulder: Tenderness present.      Right wrist: Tenderness present.      Left wrist: Tenderness present.      Cervical back: Normal range of motion and neck supple. Tenderness present.      Thoracic back: Tenderness present.      Lumbar back: Tenderness present.   Skin:     General: Skin is warm and dry.   Neurological:      General: No focal deficit present.      Mental Status: She is alert and oriented to person, place, and time. Mental status is at baseline.      Cranial Nerves: No cranial nerve deficit (II-XII).   Psychiatric:         Mood and Affect: Mood normal.         Behavior: Behavior normal. Behavior is cooperative.         Thought Content: Thought content normal.         US Abdomen Complete  Narrative: EXAMINATION:  US ABDOMEN COMPLETE    CLINICAL HISTORY:  Abdominal Pain    TECHNIQUE:  Grayscale and color Doppler imaging performed    COMPARISON:  None available    FINDINGS:  The liver is normal in size and echogenicity. The gallbladder is fluid-filled without evidence " of stones or sludge. The gallbladder wall thickness is 2.0 mm. The common bile duct measures 2.0 mm.    The visualized portion of the pancreas appear within normal limits    The kidneys are normal in size and echogenicity without hydronephrosis or other abnormality. The right renal length is 11.3 cm. Left renal length is 11.1 cm.    Spleen, aorta and IVC appear within normal limits. No free fluid or free air seen.  Impression: No evidence of abnormality demonstrated.    Ultrasound images stored and captured.    Electronically signed by: Eren James  Date:    02/15/2023  Time:    09:16         Office Visit on 09/10/2024   Component Date Value Ref Range Status    Culture, Wound/Abscess 09/10/2024 Heavy Growth Methicillin resistant Staphylococcus aureus (A)   Final    Culture, Anaerobe 09/10/2024 No Anaerobes Isolated   Final   Office Visit on 08/20/2024   Component Date Value Ref Range Status    POC Amphetamines 08/20/2024 Negative  Negative, Inconclusive Final    POC Barbiturates 08/20/2024 Negative  Negative, Inconclusive Final    POC Benzodiazepines 08/20/2024 Negative  Negative, Inconclusive Final    POC Cocaine 08/20/2024 Negative  Negative, Inconclusive Final    POC THC 08/20/2024 Negative  Negative, Inconclusive Final    POC Methadone 08/20/2024 Negative  Negative, Inconclusive Final    POC Methamphetamine 08/20/2024 Negative  Negative, Inconclusive Final    POC Opiates 08/20/2024 Presumptive Positive (A)  Negative, Inconclusive Final    POC Oxycodone 08/20/2024 Negative  Negative, Inconclusive Final    POC Phencyclidine 08/20/2024 Negative  Negative, Inconclusive Final    POC Methylenedioxymethamphetamine * 08/20/2024 Negative  Negative, Inconclusive Final    POC Tricyclic Antidepressants 08/20/2024 Negative  Negative, Inconclusive Final    POC Buprenorphine 08/20/2024 Negative   Final     Acceptable 08/20/2024 Yes   Final    POC Temperature (Urine) 08/20/2024 94   Final          Orders Placed This Encounter   Procedures    POCT Urine Drug Screen Presump     Interpretive Information:     Negative:  No drug detected at the cut off level.   Positive:  This result represents presumptive positive for the   tested drug, other substances may yield a positive response other   than the analyte of interest. This result should be utilized for   diagnostic purpose only. Confirmation testing will be performed upon physician request only.          Requested Prescriptions     Signed Prescriptions Disp Refills    gabapentin (NEURONTIN) 300 MG capsule 90 capsule 2     Sig: Take 1 capsule (300 mg total) by mouth every 8 (eight) hours.    HYDROcodone-acetaminophen (NORCO)  mg per tablet 90 tablet 0     Sig: Take 1 tablet by mouth every 8 (eight) hours as needed for Pain.    HYDROcodone-acetaminophen (NORCO)  mg per tablet 90 tablet 0     Sig: Take 1 tablet by mouth every 8 (eight) hours as needed for Pain.    HYDROcodone-acetaminophen (NORCO)  mg per tablet 90 tablet 0     Sig: Take 1 tablet by mouth every 8 (eight) hours as needed for Pain.       Assessment:     1. Rheumatoid arthritis involving multiple sites with positive rheumatoid factor    2. Ankylosing spondylitis of multiple sites in spine    3. Cervical radiculopathy    4. Encounter for long-term (current) use of medications                 A's of Opioid Risk Assessment  Activity:Patient can perform ADL.   Analgesia:Patients pain is partially controlled by current medication. Patient has tried OTC medications such as Tylenol and Ibuprofen with out relief.   Adverse Effects: Patient denies constipation or sedation.  Aberrant Behavior:  reviewed with no aberrant drug seeking/taking behavior.  Overdose reversal drug naloxone discussed    Drug screen reviewed      MRI cervical spine [facility] July 24, 2024  C3-C4: Diffuse mild-to-moderate disc bulge without focal neural   impingement   C4-C5: Mild disc bulge with  asymmetric focal protrusion of disc and   osteophyte complex into the left lateral recess and foraminal region   with possible encroachment on the left C5 nerve root.   C5-C6: Diffuse prominent disc and osteophyte bulge. This leads to   central canal narrowing and bilateral lateral recess and foraminal   narrowing. AP canal diameter is 8 mm.   C6-C7: Mild to moderate diffuse disc and osteophyte bulge. Borderline   canal narrowing. Mild foraminal narrowing bilaterally.           Plan:    Narcan January 2023    Follows rheumatology, rheumatoid arthritis, ankylosing spondylitis    Cardiac bypass surgery [facility] postponed due to small-vessel disease, limited options for bypass     Anticoagulated after multiple myocardial infarction    Cardiology has informed her she can not have:  Scope/procedures until further notice due to poor cardiac function    Following Neurology please see notes in epic    She had EMG nerve conduction study Dr. Paige Ernst [place]      Recovering after right carpal tunnel release dressing in place     She would like to continue with current medication she states it is helping with her discomfort    Continue current medication    Will continue home exercise program as directed     Follow-up 3 months      Dr. Venegas, November 2024     Bring original prescription medication bottles/container/box with labels to each visit

## 2025-02-05 ENCOUNTER — OFFICE VISIT (OUTPATIENT)
Dept: PAIN MEDICINE | Facility: CLINIC | Age: 45
End: 2025-02-05
Payer: MEDICARE

## 2025-02-05 VITALS
HEIGHT: 62 IN | BODY MASS INDEX: 27.97 KG/M2 | HEART RATE: 86 BPM | RESPIRATION RATE: 16 BRPM | DIASTOLIC BLOOD PRESSURE: 65 MMHG | SYSTOLIC BLOOD PRESSURE: 90 MMHG | WEIGHT: 152 LBS

## 2025-02-05 DIAGNOSIS — M45.0 ANKYLOSING SPONDYLITIS OF MULTIPLE SITES IN SPINE: Chronic | ICD-10-CM

## 2025-02-05 DIAGNOSIS — M54.12 CERVICAL RADICULOPATHY: Chronic | ICD-10-CM

## 2025-02-05 DIAGNOSIS — Z79.899 ENCOUNTER FOR LONG-TERM (CURRENT) USE OF MEDICATIONS: ICD-10-CM

## 2025-02-05 DIAGNOSIS — M05.79 RHEUMATOID ARTHRITIS INVOLVING MULTIPLE SITES WITH POSITIVE RHEUMATOID FACTOR: Primary | Chronic | ICD-10-CM

## 2025-02-05 PROCEDURE — 99999PBSHW POCT URINE DRUG SCREEN PRESUMP: Mod: PBBFAC,,,

## 2025-02-05 PROCEDURE — 99214 OFFICE O/P EST MOD 30 MIN: CPT | Mod: S$PBB,,, | Performed by: PHYSICIAN ASSISTANT

## 2025-02-05 PROCEDURE — 99215 OFFICE O/P EST HI 40 MIN: CPT | Mod: PBBFAC | Performed by: PHYSICIAN ASSISTANT

## 2025-02-05 PROCEDURE — 80305 DRUG TEST PRSMV DIR OPT OBS: CPT | Mod: PBBFAC | Performed by: PHYSICIAN ASSISTANT

## 2025-02-05 PROCEDURE — 99999 PR PBB SHADOW E&M-EST. PATIENT-LVL V: CPT | Mod: PBBFAC,,, | Performed by: PHYSICIAN ASSISTANT

## 2025-02-05 RX ORDER — HYDROCODONE BITARTRATE AND ACETAMINOPHEN 10; 325 MG/1; MG/1
1 TABLET ORAL EVERY 8 HOURS PRN
Qty: 90 TABLET | Refills: 0 | Status: SHIPPED | OUTPATIENT
Start: 2025-03-29

## 2025-02-05 RX ORDER — HYDROCODONE BITARTRATE AND ACETAMINOPHEN 10; 325 MG/1; MG/1
1 TABLET ORAL EVERY 8 HOURS PRN
Qty: 90 TABLET | Refills: 0 | Status: SHIPPED | OUTPATIENT
Start: 2025-02-27

## 2025-02-05 RX ORDER — HYDROCODONE BITARTRATE AND ACETAMINOPHEN 10; 325 MG/1; MG/1
1 TABLET ORAL EVERY 8 HOURS PRN
Qty: 90 TABLET | Refills: 0 | Status: SHIPPED | OUTPATIENT
Start: 2025-04-28

## 2025-02-05 RX ORDER — GABAPENTIN 300 MG/1
300 CAPSULE ORAL EVERY 8 HOURS
Qty: 90 CAPSULE | Refills: 2 | Status: SHIPPED | OUTPATIENT
Start: 2025-02-05

## 2025-02-15 ENCOUNTER — PATIENT MESSAGE (OUTPATIENT)
Dept: PAIN MEDICINE | Facility: CLINIC | Age: 45
End: 2025-02-15
Payer: MEDICARE

## 2025-02-18 NOTE — TELEPHONE ENCOUNTER
Spoke with patient via telephone. Informed patient again about the 12 days added to the Norco refill. Explained to patient that CRYSTAL Alfaro PA stated she can bring in the Tramadol and the refill will be adjusted. Patient stated she will see if she can get a ride in and if not she will just wait until the 27th. Patient voiced understanding.

## 2025-04-14 ENCOUNTER — TELEPHONE (OUTPATIENT)
Dept: ORTHOPEDICS | Facility: CLINIC | Age: 45
End: 2025-04-14
Payer: MEDICARE

## 2025-04-14 NOTE — TELEPHONE ENCOUNTER
Talked with patient. Let her know I could get her in with NP tomorrow. Patient agreed to appt tomorrow 4/15/25 @ 220pm to be seen.

## 2025-04-14 NOTE — TELEPHONE ENCOUNTER
----- Message from Kayleigh sent at 4/14/2025  2:49 PM CDT -----  Who Called:  Cherry Hdz (please be specific): pt fell, hurt left knee and wrist, went to urgent care at [facility] but the xr did not show a fracture but the doctor told her he thinks it is fractured. How long has patient had these symptoms:  this morningPlease call patient back when you can, she said she has seen him before but it has been a few years. Preferred Method of Contact: Phone CallPatient's Preferred Phone Number on File: [phone]

## 2025-04-15 ENCOUNTER — HOSPITAL ENCOUNTER (OUTPATIENT)
Dept: RADIOLOGY | Facility: HOSPITAL | Age: 45
Discharge: HOME | End: 2025-04-15
Attending: NURSE PRACTITIONER
Payer: MEDICARE

## 2025-04-15 ENCOUNTER — OFFICE VISIT (OUTPATIENT)
Dept: ORTHOPEDICS | Facility: CLINIC | Age: 45
End: 2025-04-15
Payer: MEDICARE

## 2025-04-15 VITALS
WEIGHT: 144 LBS | BODY MASS INDEX: 26.5 KG/M2 | SYSTOLIC BLOOD PRESSURE: 94 MMHG | DIASTOLIC BLOOD PRESSURE: 70 MMHG | OXYGEN SATURATION: 97 % | HEIGHT: 62 IN | HEART RATE: 99 BPM

## 2025-04-15 DIAGNOSIS — M25.532 LEFT WRIST PAIN: Primary | ICD-10-CM

## 2025-04-15 DIAGNOSIS — M25.532 LEFT WRIST PAIN: ICD-10-CM

## 2025-04-15 DIAGNOSIS — M25.562 ACUTE PAIN OF LEFT KNEE: ICD-10-CM

## 2025-04-15 PROCEDURE — 99215 OFFICE O/P EST HI 40 MIN: CPT | Mod: PBBFAC,25 | Performed by: NURSE PRACTITIONER

## 2025-04-15 PROCEDURE — 73110 X-RAY EXAM OF WRIST: CPT | Mod: TC,LT

## 2025-04-15 PROCEDURE — 73110 X-RAY EXAM OF WRIST: CPT | Mod: 26,LT,, | Performed by: RADIOLOGY

## 2025-04-15 PROCEDURE — 99213 OFFICE O/P EST LOW 20 MIN: CPT | Mod: S$PBB,,, | Performed by: NURSE PRACTITIONER

## 2025-04-15 PROCEDURE — 99999 PR PBB SHADOW E&M-EST. PATIENT-LVL V: CPT | Mod: PBBFAC,,, | Performed by: NURSE PRACTITIONER

## 2025-04-15 PROCEDURE — 73564 X-RAY EXAM KNEE 4 OR MORE: CPT | Mod: TC,LT

## 2025-04-15 NOTE — PROGRESS NOTES
HPI:   Cherry Smiley is a pleasant 44 y.o. patient who reports to clinic for evaluation of left wrist pain.     Injury onset and description: Patient tripped over a dog yesterday morning causing her to fall. While trying to get up she pulled the bar stool over on her.   Patient's occupation:   She reports landing hard on her left side.She is complaining of left knee pain and left wrist pain. Previous patient of Dr Simmons. She sees pain management for chronic left knee pain and other medical issues use.  She has had a previous left knee arthroscopy done at [facility].  She does have some mild swelling to the knee today.  Complains of pain over the lateral side of the left knee.  In regards to the wrist, she also has swelling swelling of the left wrist.  Motion of the wrist is very painful.  This is not a work related injury.   This injury has been non-responsive to conservative care. The pain is worse with repetitive use, and strenuous activity is very difficult.    her pain improves with rest.  she rates pain as a  8/10on the Visual Analog Scale.        She also complains of left knee pain since the fall.   She reports most of her pain to be on the lateral aspect of her knee.   8/10 Pain today    She has a previous history of bilateral knee surgery at [facility];  injury at that time.       PAST MEDICAL HISTORY:   Past Medical History:   Diagnosis Date    Allergy     Anxiety     Congenital hypothyroidism     Depressive disorder     GERD (gastroesophageal reflux disease)     Hyperlipidemia     Hypertension     Insomnia     Irritable bowel syndrome     Lumbosacral spondylosis     NSTEMI (non-ST elevated myocardial infarction) 05/09/2022    Primary fibromyalgia syndrome     Seizures     Sleep apnea     Type 2 diabetes mellitus      PAST SURGICAL HISTORY:   Past Surgical History:   Procedure Laterality Date    ADENOIDECTOMY      ANGIOGRAM, CORONARY, WITH LEFT HEART CATHETERIZATION N/A  "05/09/2022    Procedure: Angiogram, Coronary, with Left Heart Cath;  Surgeon: Arthur Dewey MD;  Location: [facility];  Service: Cardiology;  Laterality: N/A;    ELECTROCARDIOGRAPHY      EPIDURAL STEROID INJECTION  09/02/2020    C5-6 FABIANO - Dr Venegas    ESOPHAGOGASTRODUODENOSCOPY      HYSTERECTOMY      INJECTION OF FACET JOINT Left 12/27/2018    Left C6-7 Facet Injection - Deb    INJECTION OF FACET JOINT Right 05/19/2019    Right C4-7 Facet Injection X 2 -Deb    INJECTION OF FACET JOINT Left 02/27/2019    Left C4-7 Facet Injection -Deb    knee bilateral      LEFT HEART CATHETERIZATION N/A 09/15/2022    Procedure: Left heart cath;  Surgeon: Arthur Dewey MD;  Location: [facility];  Service: Cardiology;  Laterality: N/A;    RADIOFREQUENCY THERMOCOAGULATION Right 05/21/2019    Right C4-7 RFTC - Deb    RADIOFREQUENCY THERMOCOAGULATION Left 04/23/2019    Left C4-7 RFTC -Deb    shoulder bilateral      TONSILLECTOMY       MEDICATIONS:  Current Medications[1]  ALLERGIES:   Review of patient's allergies indicates:   Allergen Reactions    Cinnamon analogues Anaphylaxis    Iodine Anaphylaxis    Morpholine analogues Shortness Of Breath    Shellfish containing products Anaphylaxis    Adhesive Other (See Comments)     Heart monitor gel on leads caused chemical burn     Morphine Other (See Comments)    Pamelor [nortriptyline]          PHYSICAL EXAM:  VITAL SIGNS: BP 94/70 (BP Location: Left arm, Patient Position: Sitting)   Pulse 99   Ht 5' 2" (1.575 m)   Wt 65.3 kg (144 lb)   SpO2 97%   BMI 26.34 kg/m²   General: Well-developed well-nourished 44 y.o. femalein no acute distress;Cardiovascular: Regular rhythm by palpation of distal pulse, normal color and temperature, no concerning varicosities on symptomatic side Lungs: No labored breathing or wheezing appreciated Neuro: Alert and oriented ×3 Psychiatric: well oriented to person, place and time, demonstrates normal mood and affect Skin: No rashes, " lesions or ulcers, normal temperature, turgor, and texture on uninvolved extremity                Left Knee Exam     Inspection   Scars: present  Swelling: present  Deformity: absent  Bruising: absent    Tenderness   The patient tender to palpation of the lateral joint line.    Range of Motion   Extension:  normal   Flexion:  abnormal     Tests   Meniscus   Terry:   Lateral - positive    Other   Sensation: normalLeft Hand/Wrist Exam     Inspection   Effusion: Wrist - present     Tenderness   The patient is tender to palpation of the ulnar area and radial area.     Range of Motion     Wrist   Extension:  abnormal   Flexion:  abnormal           Muscle Strength   Left Upper Extremity  :  4/5     Vascular Exam       Left Pulses  Dorsalis Pedis:      2+          Capillary Refill  Left Hand: normal capillary refill          IMAGING:  X-Ray Knee Complete 4 or More Views Left  Result Date: 4/22/2025  EXAMINATION: XR KNEE COMP 4 OR MORE VIEWS LEFT CLINICAL HISTORY: Pain in left knee TECHNIQUE: AP, Lateral, Sunrise and Tunnel views of the left knee were preformed COMPARISON: 02/27/2014 FINDINGS: There is prepatellar soft tissue swelling.  No joint effusion, fracture or dislocation is present.  The bones appear slightly demineralized, and there are minor degenerative changes.  Calcific plaque noted in the superficial femoral artery.     Prepatellar soft tissue swelling.  No fracture or joint effusion. Electronically signed by: Leeanna Choi Date:    04/22/2025 Time:    16:42    X-Ray Wrist Complete 3 views Left  Result Date: 4/15/2025  EXAMINATION: XR WRIST COMPLETE 3 VIEWS LEFT CLINICAL HISTORY: Pain in left wrist FINDINGS: Three views of the left wrist show no acute fracture, dislocation or destructive osseous lesion. The joint spaces are preserved. Bone mineralization is normal, with no soft tissue abnormalities or radiopaque foreign bodies seen.     Negative left wrist radiographs. Electronically signed  by: Ole Reddy Date:    04/15/2025 Time:    13:58        ASSESSMENT:      ICD-10-CM ICD-9-CM   1. Left wrist pain  M25.532 719.43   2. Acute pain of left knee  M25.562 719.46       PLAN:     -Findings and treatment options were discussed with the patient  -All questions answered  Removable brace to left wrist.  No lifting at this time.  We will order MRI of her left knee and have her return to clinic with Dr. Premier hillman after.    There are no Patient Instructions on file for this visit.  Orders Placed This Encounter   Procedures    X-Ray Wrist Complete 3 views Left     Standing Status:   Future     Number of Occurrences:   1     Expected Date:   4/15/2025     Expiration Date:   4/15/2026     May the Radiologist modify the order per protocol to meet the clinical needs of the patient?:   Yes     Release to patient:   Immediate    X-Ray Knee Complete 4 or More Views Left     Standing Status:   Future     Number of Occurrences:   1     Expected Date:   4/15/2025     Expiration Date:   4/15/2026     May the Radiologist modify the order per protocol to meet the clinical needs of the patient?:   Yes     Release to patient:   Immediate    MRI Knee Without Contrast Left     Standing Status:   Future     Expected Date:   4/15/2025     Expiration Date:   4/15/2026     Does the patient have or ever had a pacemaker or a defibrillator (Note: Some facilities may not be able to schedule an MRI for patients with pacemakers and defibrillators. You should contact your local radiology dept to determine if this is the case.)?:   No     Does the patient have an aneurysm or surgical clip, pump, nerve/brain stimulator, middle/inner ear prosthesis, or other metal implant or foreign object (bullet, shrapnel)? If they have a card related to their implant, ask them to bring it. Issues related to the implant may cause the MRI to be delayed.:   No     Will the patient require po anxiolysis or sedation?:   No     May the Radiologist modify the  "order per protocol to meet the clinical needs of the patient?:   Yes     Does the patient have on a skin patch for medication with aluminized backing?:   No     Procedures           [1]   Current Outpatient Medications:     aspirin (ECOTRIN) 81 MG EC tablet, Take 1 tablet (81 mg total) by mouth once daily., Disp: , Rfl: 0    atorvastatin (LIPITOR) 80 MG tablet, Take 1 tablet by mouth in the evening, Disp: 90 tablet, Rfl: 0    BD INSULIN SYRINGE ULTRA-FINE 0.5 mL 30 gauge x 1/2" Syrg, USE SYRINGE THREE TIMES DAILY AS DIRECTED, Disp: , Rfl:     dapagliflozin-metformin (XIGDUO XR) 5-500 mg TBph, Take by mouth., Disp: , Rfl:     fenofibrate (TRICOR) 145 MG tablet, Take 145 mg by mouth once daily., Disp: , Rfl:     folic acid (FOLVITE) 1 MG tablet, Take 1 tablet (1 mg total) by mouth once daily., Disp: 90 tablet, Rfl: 1    gabapentin (NEURONTIN) 300 MG capsule, Take 1 capsule (300 mg total) by mouth every 8 (eight) hours., Disp: 90 capsule, Rfl: 2    HYDROcodone-acetaminophen (NORCO)  mg per tablet, Take 1 tablet by mouth every 8 (eight) hours as needed for Pain., Disp: 90 tablet, Rfl: 0    HYDROcodone-acetaminophen (NORCO)  mg per tablet, Take 1 tablet by mouth every 8 (eight) hours as needed for Pain., Disp: 90 tablet, Rfl: 0    [START ON 4/28/2025] HYDROcodone-acetaminophen (NORCO)  mg per tablet, Take 1 tablet by mouth every 8 (eight) hours as needed for Pain., Disp: 90 tablet, Rfl: 0    insulin NPH-insulin regular, 70/30, (NOVOLIN 70/30) 100 unit/mL (70-30) injection, Inject 22 Units into the skin 2 (two) times daily before meals., Disp: , Rfl:     isosorbide dinitrate (ISORDIL) 30 MG Tab, Take 20 mg by mouth 3 (three) times daily., Disp: , Rfl:     magnesium oxide (MAG-OX) 400 mg (241.3 mg magnesium) tablet, Take 1 tablet by mouth every evening., Disp: , Rfl:     methotrexate 2.5 MG Tab, Take 6 tablets (15 mg total) by mouth every 7 days. TAKE 6 TABLETS BY MOUTH ONCE A WEEK, Disp: 72 tablet, Rfl: " "0    metoprolol succinate (TOPROL-XL) 200 MG 24 hr tablet, Take 1 tablet by mouth once daily, Disp: 90 tablet, Rfl: 0    naloxone (NARCAN) 4 mg/actuation [place], , Disp: , Rfl:     nitroGLYCERIN (NITROSTAT) 0.4 MG SL tablet, Place 0.4 mg under the tongue every 5 (five) minutes as needed. DO NOT EXCEED A TOTAL OF 3 DOSES IN 15 MINUTES, Disp: , Rfl:     ondansetron (ZOFRAN) 4 mg/5 mL solution, Take 8 mg by mouth 2 (two) times daily., Disp: , Rfl:     ONETOUCH ULTRA TEST Strp, 1 strip 3 (three) times daily. To check blood sugar, Disp: , Rfl:     ranolazine (RANEXA) 1,000 mg Tb12, , Disp: , Rfl:     ticagrelor (BRILINTA) 90 mg tablet, Take 90 mg by mouth 2 (two) times daily., Disp: , Rfl:     zavegepant (ZAVZPRET) 10 mg/actuation [place], by Nasal route., Disp: , Rfl:     zonisamide (ZONEGRAN) 100 MG Cap, Take by mouth., Disp: , Rfl:     LEVEMIR FLEXTOUCH U-100 INSULN 100 unit/mL (3 mL) InPn pen, INJECT 40 UNITS SUBCUTANEOUSLY ONCE DAILY, Disp: , Rfl:     pen needle, diabetic 32 gauge x 5/32" Ndle, USE AS DIRECTED WITH LEVEMIR PEN, Disp: , Rfl:     "

## 2025-05-07 ENCOUNTER — OFFICE VISIT (OUTPATIENT)
Dept: PAIN MEDICINE | Facility: CLINIC | Age: 45
End: 2025-05-07
Payer: MEDICARE

## 2025-05-07 DIAGNOSIS — M45.0 ANKYLOSING SPONDYLITIS OF MULTIPLE SITES IN SPINE: Chronic | ICD-10-CM

## 2025-05-07 DIAGNOSIS — M54.12 CERVICAL RADICULOPATHY: Chronic | ICD-10-CM

## 2025-05-07 DIAGNOSIS — M05.79 RHEUMATOID ARTHRITIS INVOLVING MULTIPLE SITES WITH POSITIVE RHEUMATOID FACTOR: Chronic | ICD-10-CM

## 2025-05-07 PROCEDURE — 99214 OFFICE O/P EST MOD 30 MIN: CPT | Mod: NDTC,,, | Performed by: PAIN MEDICINE

## 2025-05-07 RX ORDER — GABAPENTIN 300 MG/1
300 CAPSULE ORAL EVERY 8 HOURS
Qty: 90 CAPSULE | Refills: 2 | Status: SHIPPED | OUTPATIENT
Start: 2025-05-07

## 2025-05-07 RX ORDER — HYDROCODONE BITARTRATE AND ACETAMINOPHEN 10; 325 MG/1; MG/1
1 TABLET ORAL EVERY 8 HOURS PRN
Qty: 90 TABLET | Refills: 0 | Status: SHIPPED | OUTPATIENT
Start: 2025-05-31 | End: 2025-06-30

## 2025-05-07 RX ORDER — HYDROCODONE BITARTRATE AND ACETAMINOPHEN 10; 325 MG/1; MG/1
1 TABLET ORAL EVERY 8 HOURS PRN
Qty: 90 TABLET | Refills: 0 | Status: SHIPPED | OUTPATIENT
Start: 2025-06-30 | End: 2025-07-30

## 2025-05-07 RX ORDER — HYDROCODONE BITARTRATE AND ACETAMINOPHEN 10; 325 MG/1; MG/1
1 TABLET ORAL EVERY 8 HOURS PRN
Qty: 90 TABLET | Refills: 0 | Status: SHIPPED | OUTPATIENT
Start: 2025-07-30 | End: 2025-08-29

## 2025-05-07 NOTE — PROGRESS NOTES
The patient location is:  Home  The chief complaint leading to consultation is:  Diffuse joints and lower back pain    Visit type: audiovisual    Face to Face time with patient:  10 minutes  Fifteen minutes of total time spent on the encounter, which includes face to face time and non-face to face time preparing to see the patient (eg, review of tests), Obtaining and/or reviewing separately obtained history, Documenting clinical information in the electronic or other health record, Independently interpreting results (not separately reported) and communicating results to the patient/family/caregiver, or Care coordination (not separately reported).         Each patient to whom he or she provides medical services by telemedicine is:  (1) informed of the relationship between the physician and patient and the respective role of any other health care provider with respect to management of the patient; and (2) notified that he or she may decline to receive medical services by telemedicine and may withdraw from such care at any time.    Notes:  Patient is a 44-year-old female with chronic pain from osteoarthritis and degenerative changes.  She has multiple comorbidities including diabetes, cardiovascular disease, and seizure disorder.  She receives chronic anticoagulation and is not a candidate for nerve block injections.  She is treated for rheumatoid arthritis with Humira and methotrexate at [facility].  She recently fell and was evaluated by Orthopedics.  She is awaiting an MRI of the knee and have.  She presents today for medication refill.

## 2025-08-04 DIAGNOSIS — M45.0 ANKYLOSING SPONDYLITIS OF MULTIPLE SITES IN SPINE: Chronic | ICD-10-CM

## 2025-08-04 DIAGNOSIS — M54.12 CERVICAL RADICULOPATHY: Chronic | ICD-10-CM

## 2025-08-04 DIAGNOSIS — M05.79 RHEUMATOID ARTHRITIS INVOLVING MULTIPLE SITES WITH POSITIVE RHEUMATOID FACTOR: Chronic | ICD-10-CM

## 2025-08-04 RX ORDER — HYDROCODONE BITARTRATE AND ACETAMINOPHEN 10; 325 MG/1; MG/1
1 TABLET ORAL EVERY 8 HOURS PRN
Qty: 90 TABLET | Refills: 0 | OUTPATIENT
Start: 2025-08-04

## 2025-08-05 ENCOUNTER — PATIENT MESSAGE (OUTPATIENT)
Dept: PAIN MEDICINE | Facility: CLINIC | Age: 45
End: 2025-08-05
Payer: MEDICARE

## 2025-08-25 ENCOUNTER — OFFICE VISIT (OUTPATIENT)
Dept: PAIN MEDICINE | Facility: CLINIC | Age: 45
End: 2025-08-25
Payer: MEDICARE

## 2025-08-25 VITALS
SYSTOLIC BLOOD PRESSURE: 98 MMHG | RESPIRATION RATE: 17 BRPM | HEART RATE: 83 BPM | DIASTOLIC BLOOD PRESSURE: 68 MMHG | HEIGHT: 62 IN | OXYGEN SATURATION: 98 % | BODY MASS INDEX: 28.34 KG/M2 | WEIGHT: 154 LBS

## 2025-08-25 DIAGNOSIS — M54.12 CERVICAL RADICULOPATHY: Chronic | ICD-10-CM

## 2025-08-25 DIAGNOSIS — Z79.899 ENCOUNTER FOR LONG-TERM (CURRENT) USE OF MEDICATIONS: ICD-10-CM

## 2025-08-25 DIAGNOSIS — M45.0 ANKYLOSING SPONDYLITIS OF MULTIPLE SITES IN SPINE: Chronic | ICD-10-CM

## 2025-08-25 DIAGNOSIS — M05.79 RHEUMATOID ARTHRITIS INVOLVING MULTIPLE SITES WITH POSITIVE RHEUMATOID FACTOR: Primary | Chronic | ICD-10-CM

## 2025-08-25 PROCEDURE — 80305 DRUG TEST PRSMV DIR OPT OBS: CPT | Mod: PBBFAC | Performed by: PHYSICIAN ASSISTANT

## 2025-08-25 PROCEDURE — 99999PBSHW POCT URINE DRUG SCREEN PRESUMP: Mod: PBBFAC,,,

## 2025-08-25 PROCEDURE — 99999 PR PBB SHADOW E&M-EST. PATIENT-LVL V: CPT | Mod: PBBFAC,,, | Performed by: PHYSICIAN ASSISTANT

## 2025-08-25 PROCEDURE — 99214 OFFICE O/P EST MOD 30 MIN: CPT | Mod: S$PBB,,, | Performed by: PHYSICIAN ASSISTANT

## 2025-08-25 PROCEDURE — 99215 OFFICE O/P EST HI 40 MIN: CPT | Mod: PBBFAC | Performed by: PHYSICIAN ASSISTANT

## 2025-08-25 RX ORDER — HYDROCODONE BITARTRATE AND ACETAMINOPHEN 10; 325 MG/1; MG/1
1 TABLET ORAL EVERY 8 HOURS PRN
Qty: 90 TABLET | Refills: 0 | Status: SHIPPED | OUTPATIENT
Start: 2025-11-03

## 2025-08-25 RX ORDER — HYDROCODONE BITARTRATE AND ACETAMINOPHEN 10; 325 MG/1; MG/1
1 TABLET ORAL EVERY 8 HOURS PRN
Qty: 90 TABLET | Refills: 0 | Status: SHIPPED | OUTPATIENT
Start: 2025-10-04

## 2025-08-25 RX ORDER — GABAPENTIN 300 MG/1
300 CAPSULE ORAL EVERY 8 HOURS
Qty: 90 CAPSULE | Refills: 2 | Status: SHIPPED | OUTPATIENT
Start: 2025-08-25

## 2025-08-25 RX ORDER — HYDROCODONE BITARTRATE AND ACETAMINOPHEN 10; 325 MG/1; MG/1
1 TABLET ORAL EVERY 8 HOURS PRN
Qty: 90 TABLET | Refills: 0 | Status: SHIPPED | OUTPATIENT
Start: 2025-09-04

## (undated) DEVICE — GUIDEWIRE RUNTHROUGH NS 180CM CORONARY

## (undated) DEVICE — GLOVE SURG BIOGEL LATEX SZ 7.5

## (undated) DEVICE — POSITIONER ULNAR NERVE

## (undated) DEVICE — CATH DIAG OPTITORQUE 6.5FR JACKY 3.5

## (undated) DEVICE — GLOVE SURGICAL PROTEXIS PI SIZE 7.5

## (undated) DEVICE — ISOVUE 370 100ML

## (undated) DEVICE — PROTECTOR ULNAR NERVE FOAM

## (undated) DEVICE — DRAPE ANGIO BRACH 38X44IN

## (undated) DEVICE — SHEATH INTRODUCER 6FR 10CM X 0.038 PINNACLE

## (undated) DEVICE — CATH NC EMERGE MR 2.5X8X143

## (undated) DEVICE — BAG-A-JET FLUID DISPENSER SYSTEM

## (undated) DEVICE — DEVICE BLUE DIAMOND INFL 20ML

## (undated) DEVICE — CATH IMPULSE 6FR FL4

## (undated) DEVICE — LINE MCRSTRM NONINTUB ST ETC02

## (undated) DEVICE — SEAL ANGIO 6FR VIP - VASCULAR CLOSURE DEVICE BX/10

## (undated) DEVICE — DRESSING TRANS 4X4 TEGADERM

## (undated) DEVICE — CDS ANGIOGRAPHY PACK

## (undated) DEVICE — KIT HEART ANGIO MFLD LEFT RUSH

## (undated) DEVICE — SYRINGE 150CC INJECTABLE POWER

## (undated) DEVICE — SET EXTENSION CLEARLINK 2INJ

## (undated) DEVICE — CATH IMPULSE 6FR PIGTAIL

## (undated) DEVICE — GLOVE PROTEXIS PI CRM 6

## (undated) DEVICE — KIT INTRAOPERATIVE ULTRASOUND PROBE COVER 6INX96IN

## (undated) DEVICE — OXISENSOR ADULT DIGIT N/S

## (undated) DEVICE — DEVICE BLEEDBACK CONTROL VALVE COPILOT

## (undated) DEVICE — DECANTER BAG FLUID TRANSFER

## (undated) DEVICE — CATH IMPULSE 6FR FR4

## (undated) DEVICE — Device

## (undated) DEVICE — CATH COR GUIDE 6FR XB3.5

## (undated) DEVICE — KIT CO-PILOT

## (undated) DEVICE — APPLICATOR CHLORAPREP LITE ORANGE 10.5ML STERILE

## (undated) DEVICE — SET IV EXTENSION 42IN W/2 PORT CLEARLINK

## (undated) DEVICE — CATH IMPULSE 6FR MULTI-PAK

## (undated) DEVICE — TUBING CAPNOLINE PLUS SMART 02 CONNECTOR(ORDER 65110)

## (undated) DEVICE — CATH IMPULSE 5FR PIGTAIL

## (undated) DEVICE — DRAPE BRACHIAL ANGIOGRAPHY TIBURON

## (undated) DEVICE — GUIDEWIRE FLOPPY WHOLEY 260 CM EXCHANGE

## (undated) DEVICE — SHEATH INTRODUCER 6FR 11CM

## (undated) DEVICE — STOPCOCK 3-WAY ROTATING

## (undated) DEVICE — GLIDESHEATH SLENDER INTRODUCER 6FR

## (undated) DEVICE — GUIDEWIRE RUNTHROUGH EF 180CM

## (undated) DEVICE — INTRODUCER KIT MICRO 4FR

## (undated) DEVICE — CONTRAST ISOVUE 370 100ML

## (undated) DEVICE — DEVICE INFLATION BLUE DIAMOND IN7112

## (undated) DEVICE — KIT MICROINTRODUCER 4FR MINI STICK II

## (undated) DEVICE — GUIDEWIRE J .035 X 260CM

## (undated) DEVICE — SET IV PRIMARY ALARIS (PRIMARY)

## (undated) DEVICE — CHLORAPREP 10.5 ML APPLICATOR

## (undated) DEVICE — DEVICE RADIAL TR BAND REG

## (undated) DEVICE — CATH BLLN FG APEX MR 2.00X8MM

## (undated) DEVICE — DRESSING IV TEGADERM 10X12CM

## (undated) DEVICE — GLOVE SURG BIOGEL SZ 7.5

## (undated) DEVICE — CATH EMERGE MR 12 X 2.00

## (undated) DEVICE — SET IV PRIMARY

## (undated) DEVICE — BALLOON TREK NC 3.0 X 15

## (undated) DEVICE — STOPCOCK 3 WAY MED PRESSURE

## (undated) DEVICE — GUIDE VISTA XB 3.5

## (undated) DEVICE — TOWEL OR STERILE BLUE 4/PK 20PK/CS

## (undated) DEVICE — GUIDEWIRE OMNIWIRE J-TIP 185CM